# Patient Record
Sex: FEMALE | Race: WHITE | NOT HISPANIC OR LATINO | Employment: UNEMPLOYED | URBAN - METROPOLITAN AREA
[De-identification: names, ages, dates, MRNs, and addresses within clinical notes are randomized per-mention and may not be internally consistent; named-entity substitution may affect disease eponyms.]

---

## 2023-06-10 ENCOUNTER — APPOINTMENT (OUTPATIENT)
Dept: ULTRASOUND IMAGING | Facility: CLINIC | Age: 63
End: 2023-06-10
Attending: STUDENT IN AN ORGANIZED HEALTH CARE EDUCATION/TRAINING PROGRAM
Payer: MEDICAID

## 2023-06-10 ENCOUNTER — HOSPITAL ENCOUNTER (INPATIENT)
Facility: CLINIC | Age: 63
LOS: 4 days | Discharge: HOME | End: 2023-06-14
Attending: STUDENT IN AN ORGANIZED HEALTH CARE EDUCATION/TRAINING PROGRAM | Admitting: OBSTETRICS & GYNECOLOGY
Payer: MEDICAID

## 2023-06-10 DIAGNOSIS — I15.9 SECONDARY HYPERTENSION: ICD-10-CM

## 2023-06-10 DIAGNOSIS — Z90.710 S/P ABDOMINAL HYSTERECTOMY: ICD-10-CM

## 2023-06-10 DIAGNOSIS — N85.8 UTERINE MASS: ICD-10-CM

## 2023-06-10 DIAGNOSIS — N93.9 VAGINAL BLEEDING: Primary | ICD-10-CM

## 2023-06-10 DIAGNOSIS — N93.9 UTERINE BLEEDING: ICD-10-CM

## 2023-06-10 DIAGNOSIS — I10 ESSENTIAL HYPERTENSION: ICD-10-CM

## 2023-06-10 DIAGNOSIS — R57.1 HYPOVOLEMIC SHOCK (H): ICD-10-CM

## 2023-06-10 DIAGNOSIS — G89.18 ACUTE POST-OPERATIVE PAIN: ICD-10-CM

## 2023-06-10 LAB
ABO/RH(D): NORMAL
ANION GAP SERPL CALCULATED.3IONS-SCNC: 16 MMOL/L (ref 7–15)
ANTIBODY SCREEN: NEGATIVE
ATRIAL RATE - MUSE: 111 BPM
BASOPHILS # BLD AUTO: 0.1 10E3/UL (ref 0–0.2)
BASOPHILS NFR BLD AUTO: 1 %
BLD PROD TYP BPU: NORMAL
BLD PROD TYP BPU: NORMAL
BLOOD COMPONENT TYPE: NORMAL
BLOOD COMPONENT TYPE: NORMAL
BUN SERPL-MCNC: 11.6 MG/DL (ref 8–23)
CA-I BLD-MCNC: 4.7 MG/DL (ref 4.4–5.2)
CALCIUM SERPL-MCNC: 8.9 MG/DL (ref 8.8–10.2)
CHLORIDE SERPL-SCNC: 97 MMOL/L (ref 98–107)
CODING SYSTEM: NORMAL
CODING SYSTEM: NORMAL
CPB POCT: NO
CREAT SERPL-MCNC: 0.87 MG/DL (ref 0.51–0.95)
CROSSMATCH: NORMAL
CROSSMATCH: NORMAL
DEPRECATED HCO3 PLAS-SCNC: 20 MMOL/L (ref 22–29)
DIASTOLIC BLOOD PRESSURE - MUSE: NORMAL MMHG
EOSINOPHIL # BLD AUTO: 0.2 10E3/UL (ref 0–0.7)
EOSINOPHIL NFR BLD AUTO: 1 %
ERYTHROCYTE [DISTWIDTH] IN BLOOD BY AUTOMATED COUNT: 15.3 % (ref 10–15)
GFR SERPL CREATININE-BSD FRML MDRD: 75 ML/MIN/1.73M2
GLUCOSE BLD-MCNC: 215 MG/DL (ref 70–99)
GLUCOSE SERPL-MCNC: 228 MG/DL (ref 70–99)
HCO3 BLDV-SCNC: 23 MMOL/L (ref 21–28)
HCO3 BLDV-SCNC: 24 MMOL/L (ref 21–28)
HCT VFR BLD AUTO: 20.3 % (ref 35–47)
HCT VFR BLD CALC: 17 % (ref 35–47)
HGB BLD-MCNC: 5.8 G/DL (ref 11.7–15.7)
HGB BLD-MCNC: 6.2 G/DL (ref 11.7–15.7)
HGB BLD-MCNC: 8.3 G/DL (ref 11.7–15.7)
HOLD SPECIMEN: NORMAL
IMM GRANULOCYTES # BLD: 0.3 10E3/UL
IMM GRANULOCYTES NFR BLD: 2 %
INR PPP: 1.04 (ref 0.85–1.15)
INTERPRETATION ECG - MUSE: NORMAL
ISSUE DATE AND TIME: NORMAL
ISSUE DATE AND TIME: NORMAL
LACTATE BLD-SCNC: 4.4 MMOL/L
LACTATE SERPL-SCNC: 2.1 MMOL/L (ref 0.7–2)
LYMPHOCYTES # BLD AUTO: 3.9 10E3/UL (ref 0.8–5.3)
LYMPHOCYTES NFR BLD AUTO: 22 %
MCH RBC QN AUTO: 28.2 PG (ref 26.5–33)
MCHC RBC AUTO-ENTMCNC: 30.5 G/DL (ref 31.5–36.5)
MCV RBC AUTO: 92 FL (ref 78–100)
MONOCYTES # BLD AUTO: 0.9 10E3/UL (ref 0–1.3)
MONOCYTES NFR BLD AUTO: 5 %
NEUTROPHILS # BLD AUTO: 12.4 10E3/UL (ref 1.6–8.3)
NEUTROPHILS NFR BLD AUTO: 69 %
NRBC # BLD AUTO: 0.5 10E3/UL
NRBC BLD AUTO-RTO: 3 /100
P AXIS - MUSE: 67 DEGREES
PCO2 BLDV: 34 MM HG (ref 40–50)
PCO2 BLDV: 35 MM HG (ref 40–50)
PH BLDV: 7.44 [PH] (ref 7.32–7.43)
PH BLDV: 7.44 [PH] (ref 7.32–7.43)
PLATELET # BLD AUTO: 430 10E3/UL (ref 150–450)
PO2 BLDV: 15 MM HG (ref 25–47)
PO2 BLDV: 16 MM HG (ref 25–47)
POTASSIUM BLD-SCNC: 4.1 MMOL/L (ref 3.4–5.3)
POTASSIUM SERPL-SCNC: 4.3 MMOL/L (ref 3.4–5.3)
PR INTERVAL - MUSE: 126 MS
QRS DURATION - MUSE: 80 MS
QT - MUSE: 368 MS
QTC - MUSE: 500 MS
R AXIS - MUSE: 39 DEGREES
RADIOLOGIST FLAGS: ABNORMAL
RBC # BLD AUTO: 2.2 10E6/UL (ref 3.8–5.2)
SAO2 % BLDV: 21 % (ref 94–100)
SAO2 % BLDV: 24 % (ref 94–100)
SODIUM BLD-SCNC: 133 MMOL/L (ref 133–144)
SODIUM SERPL-SCNC: 133 MMOL/L (ref 136–145)
SPECIMEN EXPIRATION DATE: NORMAL
SYSTOLIC BLOOD PRESSURE - MUSE: NORMAL MMHG
T AXIS - MUSE: 49 DEGREES
UNIT ABO/RH: NORMAL
UNIT ABO/RH: NORMAL
UNIT NUMBER: NORMAL
UNIT NUMBER: NORMAL
UNIT STATUS: NORMAL
UNIT STATUS: NORMAL
UNIT TYPE ISBT: 5100
UNIT TYPE ISBT: 5100
VENTRICULAR RATE- MUSE: 111 BPM
WBC # BLD AUTO: 17.8 10E3/UL (ref 4–11)

## 2023-06-10 PROCEDURE — 99223 1ST HOSP IP/OBS HIGH 75: CPT | Mod: 57 | Performed by: OBSTETRICS & GYNECOLOGY

## 2023-06-10 PROCEDURE — 36415 COLL VENOUS BLD VENIPUNCTURE: CPT | Performed by: STUDENT IN AN ORGANIZED HEALTH CARE EDUCATION/TRAINING PROGRAM

## 2023-06-10 PROCEDURE — 96360 HYDRATION IV INFUSION INIT: CPT

## 2023-06-10 PROCEDURE — 86923 COMPATIBILITY TEST ELECTRIC: CPT | Performed by: STUDENT IN AN ORGANIZED HEALTH CARE EDUCATION/TRAINING PROGRAM

## 2023-06-10 PROCEDURE — 99291 CRITICAL CARE FIRST HOUR: CPT | Mod: 25

## 2023-06-10 PROCEDURE — 86923 COMPATIBILITY TEST ELECTRIC: CPT

## 2023-06-10 PROCEDURE — 82803 BLOOD GASES ANY COMBINATION: CPT

## 2023-06-10 PROCEDURE — 99291 CRITICAL CARE FIRST HOUR: CPT | Mod: 25 | Performed by: STUDENT IN AN ORGANIZED HEALTH CARE EDUCATION/TRAINING PROGRAM

## 2023-06-10 PROCEDURE — 76856 US EXAM PELVIC COMPLETE: CPT

## 2023-06-10 PROCEDURE — 85018 HEMOGLOBIN: CPT | Performed by: STUDENT IN AN ORGANIZED HEALTH CARE EDUCATION/TRAINING PROGRAM

## 2023-06-10 PROCEDURE — 85610 PROTHROMBIN TIME: CPT | Performed by: STUDENT IN AN ORGANIZED HEALTH CARE EDUCATION/TRAINING PROGRAM

## 2023-06-10 PROCEDURE — 82947 ASSAY GLUCOSE BLOOD QUANT: CPT | Performed by: STUDENT IN AN ORGANIZED HEALTH CARE EDUCATION/TRAINING PROGRAM

## 2023-06-10 PROCEDURE — 82947 ASSAY GLUCOSE BLOOD QUANT: CPT

## 2023-06-10 PROCEDURE — P9016 RBC LEUKOCYTES REDUCED: HCPCS | Performed by: STUDENT IN AN ORGANIZED HEALTH CARE EDUCATION/TRAINING PROGRAM

## 2023-06-10 PROCEDURE — 96361 HYDRATE IV INFUSION ADD-ON: CPT

## 2023-06-10 PROCEDURE — 85025 COMPLETE CBC W/AUTO DIFF WBC: CPT | Performed by: STUDENT IN AN ORGANIZED HEALTH CARE EDUCATION/TRAINING PROGRAM

## 2023-06-10 PROCEDURE — 83036 HEMOGLOBIN GLYCOSYLATED A1C: CPT

## 2023-06-10 PROCEDURE — 76856 US EXAM PELVIC COMPLETE: CPT | Mod: 26 | Performed by: RADIOLOGY

## 2023-06-10 PROCEDURE — 83605 ASSAY OF LACTIC ACID: CPT

## 2023-06-10 PROCEDURE — 258N000003 HC RX IP 258 OP 636: Performed by: STUDENT IN AN ORGANIZED HEALTH CARE EDUCATION/TRAINING PROGRAM

## 2023-06-10 PROCEDURE — 86901 BLOOD TYPING SEROLOGIC RH(D): CPT | Performed by: STUDENT IN AN ORGANIZED HEALTH CARE EDUCATION/TRAINING PROGRAM

## 2023-06-10 PROCEDURE — 86850 RBC ANTIBODY SCREEN: CPT | Performed by: STUDENT IN AN ORGANIZED HEALTH CARE EDUCATION/TRAINING PROGRAM

## 2023-06-10 PROCEDURE — 93010 ELECTROCARDIOGRAM REPORT: CPT | Performed by: STUDENT IN AN ORGANIZED HEALTH CARE EDUCATION/TRAINING PROGRAM

## 2023-06-10 PROCEDURE — 36430 TRANSFUSION BLD/BLD COMPNT: CPT

## 2023-06-10 PROCEDURE — 93005 ELECTROCARDIOGRAM TRACING: CPT

## 2023-06-10 PROCEDURE — 120N000002 HC R&B MED SURG/OB UMMC

## 2023-06-10 RX ORDER — ONDANSETRON 2 MG/ML
4 INJECTION INTRAMUSCULAR; INTRAVENOUS EVERY 6 HOURS PRN
Status: DISCONTINUED | OUTPATIENT
Start: 2023-06-10 | End: 2023-06-14 | Stop reason: HOSPADM

## 2023-06-10 RX ORDER — ACETAMINOPHEN 325 MG/1
975 TABLET ORAL ONCE
Status: CANCELLED | OUTPATIENT
Start: 2023-06-10 | End: 2023-06-10

## 2023-06-10 RX ORDER — ONDANSETRON 4 MG/1
4 TABLET, ORALLY DISINTEGRATING ORAL EVERY 6 HOURS PRN
Status: DISCONTINUED | OUTPATIENT
Start: 2023-06-10 | End: 2023-06-14 | Stop reason: HOSPADM

## 2023-06-10 RX ORDER — ACETAMINOPHEN 325 MG/1
650 TABLET ORAL EVERY 6 HOURS PRN
Status: DISCONTINUED | OUTPATIENT
Start: 2023-06-10 | End: 2023-06-13

## 2023-06-10 RX ORDER — LIDOCAINE 40 MG/G
CREAM TOPICAL
Status: DISCONTINUED | OUTPATIENT
Start: 2023-06-10 | End: 2023-06-12

## 2023-06-10 RX ORDER — AMOXICILLIN 250 MG
2 CAPSULE ORAL 2 TIMES DAILY PRN
Status: DISCONTINUED | OUTPATIENT
Start: 2023-06-10 | End: 2023-06-14 | Stop reason: HOSPADM

## 2023-06-10 RX ORDER — SODIUM CHLORIDE 9 MG/ML
INJECTION, SOLUTION INTRAVENOUS CONTINUOUS
Status: DISCONTINUED | OUTPATIENT
Start: 2023-06-10 | End: 2023-06-11

## 2023-06-10 RX ORDER — AMOXICILLIN 250 MG
1 CAPSULE ORAL 2 TIMES DAILY PRN
Status: DISCONTINUED | OUTPATIENT
Start: 2023-06-10 | End: 2023-06-14 | Stop reason: HOSPADM

## 2023-06-10 RX ADMIN — SODIUM CHLORIDE 1000 ML: 9 INJECTION, SOLUTION INTRAVENOUS at 18:13

## 2023-06-10 ASSESSMENT — ACTIVITIES OF DAILY LIVING (ADL)
ADLS_ACUITY_SCORE: 35

## 2023-06-10 NOTE — ED TRIAGE NOTES
"Pt arrives ambulatory to triage w/ c/o vaginal bleeding, sob, dizziness, cold/clammy. Endorses she had ~11 months w/out bleeding- thought she was becoming post menopausal when she started to experience heavy bleeding w/ clots. Catalyst to being seen pt began experiencing heavier bleeding and passing ~6in size clots. States pta soaking through 8-12 pads throughout the day. Endorses a \"teeny bit\" of abd pain.      "

## 2023-06-10 NOTE — ED TRIAGE NOTES
Triage Assessment     Row Name 06/10/23 8092       Triage Assessment (Adult)    Airway WDL WDL       Respiratory WDL    Respiratory WDL WDL       Skin Circulation/Temperature WDL    Skin Circulation/Temperature WDL X  pallor       Cardiac WDL    Cardiac WDL X;rhythm       Peripheral/Neurovascular WDL    Peripheral Neurovascular WDL WDL       Cognitive/Neuro/Behavioral WDL    Cognitive/Neuro/Behavioral WDL WDL

## 2023-06-10 NOTE — ED PROVIDER NOTES
[facility] ([facility])    6/10/23      History     Chief Complaint   Patient presents with     Vaginal Bleeding     HPI  Kylah Ojeda is a 62 year old female who with PMH of hypertension and controlled type II DM without complication, without long-term current use of insulin presents to the ED with heavy vaginal bleeding.  Patient reports she had initially thought she was going through menopause with 11 to 12 months with no periods and subsequently had an increase in heavy vaginal bleeding that has worsened over the past year.  States she started having symptoms of dizziness and lightheadedness approximately 8 hours ago that came along with heavy vaginal bleeding.  Has not noted any other bleeding.  Is not on any blood thinning medications.  Reports she has not seen a doctor for the symptoms because she lost her job and did not have insurance.  Denies any fevers, chills or other infectious symptoms.  No nausea or vomiting.  Denies abdominal pain aside from cramping.      Physical Exam   BP: 97/55  Pulse: 120  Temp: 97.6  F (36.4  C)  Resp: 16  SpO2: 99 %  Physical Exam  General: no acute distress. Appears stated age.   HENT: MMM, no oropharyngeal lesions  Eyes: PERRL, normal sclerae  Neck: non-tender, supple  Cardio: Tachycardic rate. Regular rhythm. Extremities well perfused  Resp: Normal work of breathing, normal respiratory rate.  Chest/Back: no visual signs of trauma, no CVA tenderness  Abdomen: no tenderness, non-distended, no rebound, no guarding  :   Neuro: alert and fully oriented. CN II-XII grossly intact. Grossly normal strength and sensation in all extremities.   MSK: no deformities. Grossly normal ROM in extremities.   Integumentary/Skin: no rash visualized, normal color  Psych: normal affect, normal behavior    ED Course, Procedures, & Data      Procedures                   Results for orders placed or performed during the hospital encounter of 06/10/23   US Pelvis  Complete without Transvaginal     Status: None (Preliminary result)    Impression    RESIDENT PRELIMINARY INTERPRETATION  IMPRESSION: Large endometrial hematoma with scattered areas of  vascular flow measuring up to 10.5 cm.      [Urgent Result: Uterine hematoma]    Finding was identified on 6/10/2023 8:29 PM.     Dr. Yao was contacted by Dr. Vickey Varner at 6/10/2023 8:34 PM and  verbalized understanding of the urgent finding.    [facility] Draw     Status: None    Narrative    The following orders were created for panel order [facility] Draw.  Procedure                               Abnormality         Status                     ---------                               -----------         ------                     Extra Blue Top Tube[[id number]]                              Final result               Extra Red Top Tube[[id number]]                               Final result               Extra Green Top (Lithium...[[id number]]                      Final result               Extra Purple Top Tube[[id number]]                            Final result               Extra Blood Bank Purple ...[[id number]]                      Final result                 Please view results for these tests on the individual orders.   Extra Blue Top Tube     Status: None   Result Value Ref Range    Hold Specimen JIC    Extra Red Top Tube     Status: None   Result Value Ref Range    Hold Specimen JIC    Extra Green Top (Lithium Heparin) Tube     Status: None   Result Value Ref Range    Hold Specimen JIC    Extra Purple Top Tube     Status: None   Result Value Ref Range    Hold Specimen JIC    Extra Blood Bank Purple Top Tube     Status: None   Result Value Ref Range    Hold Specimen JIC    Basic metabolic panel     Status: Abnormal   Result Value Ref Range    Sodium 133 (L) 136 - 145 mmol/L    Potassium 4.3 3.4 - 5.3 mmol/L    Chloride 97 (L) 98 - 107 mmol/L    Carbon Dioxide (CO2) 20 (L) 22 - 29 mmol/L    Anion Gap 16 (H) 7 - 15 mmol/L    Urea  Nitrogen 11.6 8.0 - 23.0 mg/dL    Creatinine 0.87 0.51 - 0.95 mg/dL    Calcium 8.9 8.8 - 10.2 mg/dL    Glucose 228 (H) 70 - 99 mg/dL    GFR Estimate 75 >60 mL/min/1.73m2   INR     Status: Normal   Result Value Ref Range    INR 1.04 0.85 - 1.15   CBC with platelets and differential     Status: Abnormal   Result Value Ref Range    WBC Count 17.8 (H) 4.0 - 11.0 10e3/uL    RBC Count 2.20 (L) 3.80 - 5.20 10e6/uL    Hemoglobin 6.2 (LL) 11.7 - 15.7 g/dL    Hematocrit 20.3 (L) 35.0 - 47.0 %    MCV 92 78 - 100 fL    MCH 28.2 26.5 - 33.0 pg    MCHC 30.5 (L) 31.5 - 36.5 g/dL    RDW 15.3 (H) 10.0 - 15.0 %    Platelet Count 430 150 - 450 10e3/uL    % Neutrophils 69 %    % Lymphocytes 22 %    % Monocytes 5 %    % Eosinophils 1 %    % Basophils 1 %    % Immature Granulocytes 2 %    NRBCs per 100 WBC 3 (H) <1 /100    Absolute Neutrophils 12.4 (H) 1.6 - 8.3 10e3/uL    Absolute Lymphocytes 3.9 0.8 - 5.3 10e3/uL    Absolute Monocytes 0.9 0.0 - 1.3 10e3/uL    Absolute Eosinophils 0.2 0.0 - 0.7 10e3/uL    Absolute Basophils 0.1 0.0 - 0.2 10e3/uL    Absolute Immature Granulocytes 0.3 <=0.4 10e3/uL    Absolute NRBCs 0.5 10e3/uL   iStat Gases Electrolytes ICA Glucose Venous, POCT     Status: Abnormal   Result Value Ref Range    CPB Applied No     Hematocrit POCT 17 (L) 35 - 47 %    Calcium, Ionized Whole Blood POCT 4.7 4.4 - 5.2 mg/dL    Glucose Whole Blood POCT 215 (H) 70 - 99 mg/dL    Bicarbonate Venous POCT 23 21 - 28 mmol/L    Hemoglobin POCT 5.8 (LL) 11.7 - 15.7 g/dL    Potassium POCT 4.1 3.4 - 5.3 mmol/L    Sodium POCT 133 133 - 144 mmol/L    pCO2 Venous POCT 34 (L) 40 - 50 mm Hg    pO2 Venous POCT 15 (L) 25 - 47 mm Hg    pH Venous POCT 7.44 (H) 7.32 - 7.43    O2 Sat, Venous POCT 21 (L) 94 - 100 %   iStat Gases (lactate) venous, POCT     Status: Abnormal   Result Value Ref Range    Lactic Acid POCT 4.4 (HH) <=2.0 mmol/L    Bicarbonate Venous POCT 24 21 - 28 mmol/L    O2 Sat, Venous POCT 24 (L) 94 - 100 %    pCO2 Venous POCT 35 (L) 40  - 50 mm Hg    pH Venous POCT 7.44 (H) 7.32 - 7.43    pO2 Venous POCT 16 (L) 25 - 47 mm Hg   EKG 12 lead     Status: None (Preliminary result)   Result Value Ref Range    Systolic Blood Pressure  mmHg    Diastolic Blood Pressure  mmHg    Ventricular Rate 111 BPM    Atrial Rate 111 BPM    MN Interval 126 ms    QRS Duration 80 ms     ms    QTc 500 ms    P Axis 67 degrees    R AXIS 39 degrees    T Axis 49 degrees    Interpretation ECG       Sinus tachycardia  Nonspecific ST and T wave abnormality  Abnormal ECG     Adult Type and Screen     Status: None   Result Value Ref Range    ABO/RH(D) B POS     Antibody Screen Negative Negative    SPECIMEN EXPIRATION DATE 20230613235900    Prepare red blood cells (unit)     Status: None   Result Value Ref Range    Blood Component Type Red Blood Cells     Product Code W9809G14     Unit Status Transfused     Unit Number [id number]     CROSSMATCH Compatible     CODING SYSTEM [id number]     ISSUE DATE AND TIME 20230610213200     UNIT ABO/RH O+     UNIT TYPE ISBT 5100    Prepare red blood cells (unit)     Status: None   Result Value Ref Range    Blood Component Type Red Blood Cells     Product Code U3621L52     Unit Status Transfused     Unit Number [id number]     CROSSMATCH Compatible     CODING SYSTEM [id number]     ISSUE DATE AND TIME 20230610200200     UNIT ABO/RH O+     UNIT TYPE ISBT 5100    ABO/Rh type and screen     Status: None    Narrative    The following orders were created for panel order ABO/Rh type and screen.  Procedure                               Abnormality         Status                     ---------                               -----------         ------                     Adult Type and Screen[[id number]]                            Final result                 Please view results for these tests on the individual orders.   CBC with platelets differential     Status: Abnormal    Narrative    The following orders were created for panel order CBC with  platelets differential.  Procedure                               Abnormality         Status                     ---------                               -----------         ------                     CBC with platelets and d...[[id number]]  Abnormal            Final result                 Please view results for these tests on the individual orders.     Medications   0.9% sodium chloride BOLUS (0 mLs Intravenous Stopped 6/10/23 1934)     Followed by   sodium chloride 0.9% infusion (has no administration in time range)     Labs Ordered and Resulted from Time of ED Arrival to Time of ED Departure   BASIC METABOLIC PANEL - Abnormal       Result Value    Sodium 133 (*)     Potassium 4.3      Chloride 97 (*)     Carbon Dioxide (CO2) 20 (*)     Anion Gap 16 (*)     Urea Nitrogen 11.6      Creatinine 0.87      Calcium 8.9      Glucose 228 (*)     GFR Estimate 75     CBC WITH PLATELETS AND DIFFERENTIAL - Abnormal    WBC Count 17.8 (*)     RBC Count 2.20 (*)     Hemoglobin 6.2 (*)     Hematocrit 20.3 (*)     MCV 92      MCH 28.2      MCHC 30.5 (*)     RDW 15.3 (*)     Platelet Count 430      % Neutrophils 69      % Lymphocytes 22      % Monocytes 5      % Eosinophils 1      % Basophils 1      % Immature Granulocytes 2      NRBCs per 100 WBC 3 (*)     Absolute Neutrophils 12.4 (*)     Absolute Lymphocytes 3.9      Absolute Monocytes 0.9      Absolute Eosinophils 0.2      Absolute Basophils 0.1      Absolute Immature Granulocytes 0.3      Absolute NRBCs 0.5     ISTAT GASES ELECTROLYTES ICA GLUCOSE VENOUS POCT - Abnormal    CPB Applied No      Hematocrit POCT 17 (*)     Calcium, Ionized Whole Blood POCT 4.7      Glucose Whole Blood POCT 215 (*)     Bicarbonate Venous POCT 23      Hemoglobin POCT 5.8 (*)     Potassium POCT 4.1      Sodium POCT 133      pCO2 Venous POCT 34 (*)     pO2 Venous POCT 15 (*)     pH Venous POCT 7.44 (*)     O2 Sat, Venous POCT 21 (*)    ISTAT GASES LACTATE VENOUS POCT - Abnormal    Lactic Acid POCT 4.4  (*)     Bicarbonate Venous POCT 24      O2 Sat, Venous POCT 24 (*)     pCO2 Venous POCT 35 (*)     pH Venous POCT 7.44 (*)     pO2 Venous POCT 16 (*)    INR - Normal    INR 1.04     LACTIC ACID WHOLE BLOOD   TYPE AND SCREEN, ADULT    ABO/RH(D) B POS      Antibody Screen Negative      SPECIMEN EXPIRATION DATE 10940972046602     PREPARE RED BLOOD CELLS (UNIT)    Blood Component Type Red Blood Cells      Product Code Z3343N03      Unit Status Transfused      Unit Number [id number]      CROSSMATCH Compatible      CODING SYSTEM [id number]      ISSUE DATE AND TIME 20230610213200      UNIT ABO/RH O+      UNIT TYPE ISBT 5100     PREPARE RED BLOOD CELLS (UNIT)    Blood Component Type Red Blood Cells      Product Code F3147N92      Unit Status Transfused      Unit Number [id number]      CROSSMATCH Compatible      CODING SYSTEM [id number]      ISSUE DATE AND TIME 20230610200200      UNIT ABO/RH O+      UNIT TYPE ISBT 5100     PREPARE RED BLOOD CELLS (UNIT)   TRANSFUSE RED BLOOD CELLS (UNIT)   TRANSFUSE RED BLOOD CELLS (UNIT)   ABO/RH TYPE AND SCREEN     US Pelvis Complete without Transvaginal   Preliminary Result   RESIDENT PRELIMINARY INTERPRETATION   IMPRESSION: Large endometrial hematoma with scattered areas of   vascular flow measuring up to 10.5 cm.         [Urgent Result: Uterine hematoma]      Finding was identified on 6/10/2023 8:29 PM.       Dr. Yao was contacted by Dr. Vickey Varner at 6/10/2023 8:34 PM and   verbalized understanding of the urgent finding.              Critical care was performed.   Critical Care Addendum  My initial assessment, based on my review of nursing observations, review of vital signs, focused history, physical exam, review of cardiac rhythm monitor, discussion with obgyn and interpretation of labs , established a high suspicion that Kylah Ojeda has severe hemorrhage and severe hypotension, which requires immediate intervention, and therefore she is critically ill.     After the initial  assessment, the care team initiated multiple lab tests, initiated IV fluid administration, consulted with GYN and performed blood transfusion to provide stabilization care. Due to the critical nature of this patient, I reassessed vital signs, physical exam, review of cardiac rhythm monitor and mental status multiple times prior to her disposition.     Time also spent performing documentation, reviewing test results, discussion with consultants and coordination of care.     Critical care time (excluding teaching time and procedures): 35 minutes.     Assessment & Plan    Kylah Ojeda 62-year-old female presenting with heavy vaginal bleeding, tachycardia and hypotension.  She appears pale but is mentating appropriately.  Concern for significant hemorrhage.  Arrives with heart rate in the 120s and blood pressure dipping to the 80s systolic.  Blood pressure did respond to initial bolus of fluids.  On my pelvic exam, she did have a large amount of blood and clot in the vaginal vault that was cleared.  The cervix was visualized and she had dark clot protruding from her cervix.  I did not disrupt this clot.  CBC, BMP, i-STAT labs and type and screen were sent on arrival.    Initial hemoglobin 5.8.  Lactate 4.  Creatinine within normal limits and no significant electrolyte abnormalities.  Elevated to 18.  We will transfuse 2 units of PRBCs.  Consulted gynecology.  Pelvic ultrasound shows heterogeneous material in the uterus consistent with possible hematoma versus malignancy.  Patient will admit to the GYN service and will likely undergo D&C this evening.    I have reviewed the nursing notes. I have reviewed the findings, diagnosis, plan and need for follow up with the patient.    New Prescriptions    No medications on file       Final diagnoses:   Vaginal bleeding       Gwen Yao MD  [facility] EMERGENCY DEPARTMENT  6/10/2023     Gwen Yao MD  06/10/23 6407

## 2023-06-11 ENCOUNTER — APPOINTMENT (OUTPATIENT)
Dept: CT IMAGING | Facility: CLINIC | Age: 63
End: 2023-06-11
Payer: MEDICAID

## 2023-06-11 ENCOUNTER — ANESTHESIA EVENT (OUTPATIENT)
Dept: SURGERY | Facility: CLINIC | Age: 63
End: 2023-06-11
Payer: MEDICAID

## 2023-06-11 LAB
ALBUMIN SERPL BCG-MCNC: 3.8 G/DL (ref 3.5–5.2)
ALBUMIN UR-MCNC: NEGATIVE MG/DL
ALP SERPL-CCNC: 44 U/L (ref 35–104)
ALT SERPL W P-5'-P-CCNC: 12 U/L (ref 10–35)
ANION GAP SERPL CALCULATED.3IONS-SCNC: 18 MMOL/L (ref 7–15)
APPEARANCE UR: CLEAR
AST SERPL W P-5'-P-CCNC: 17 U/L (ref 10–35)
BILIRUB SERPL-MCNC: 0.2 MG/DL
BILIRUB UR QL STRIP: NEGATIVE
BLD PROD TYP BPU: NORMAL
BLD PROD TYP BPU: NORMAL
BLOOD COMPONENT TYPE: NORMAL
BLOOD COMPONENT TYPE: NORMAL
BUN SERPL-MCNC: 11.7 MG/DL (ref 8–23)
CALCIUM SERPL-MCNC: 9 MG/DL (ref 8.8–10.2)
CHLORIDE SERPL-SCNC: 97 MMOL/L (ref 98–107)
CODING SYSTEM: NORMAL
CODING SYSTEM: NORMAL
COLOR UR AUTO: ABNORMAL
CREAT SERPL-MCNC: 0.89 MG/DL (ref 0.51–0.95)
CROSSMATCH: NORMAL
CROSSMATCH: NORMAL
DEPRECATED HCO3 PLAS-SCNC: 18 MMOL/L (ref 22–29)
ERYTHROCYTE [DISTWIDTH] IN BLOOD BY AUTOMATED COUNT: 16.4 % (ref 10–15)
GFR SERPL CREATININE-BSD FRML MDRD: 73 ML/MIN/1.73M2
GLUCOSE BLDC GLUCOMTR-MCNC: 106 MG/DL (ref 70–99)
GLUCOSE BLDC GLUCOMTR-MCNC: 113 MG/DL (ref 70–99)
GLUCOSE BLDC GLUCOMTR-MCNC: 116 MG/DL (ref 70–99)
GLUCOSE BLDC GLUCOMTR-MCNC: 117 MG/DL (ref 70–99)
GLUCOSE BLDC GLUCOMTR-MCNC: 119 MG/DL (ref 70–99)
GLUCOSE SERPL-MCNC: 223 MG/DL (ref 70–99)
GLUCOSE UR STRIP-MCNC: NEGATIVE MG/DL
HBA1C MFR BLD: 5.7 %
HCT VFR BLD AUTO: 23 % (ref 35–47)
HGB BLD-MCNC: 7 G/DL (ref 11.7–15.7)
HGB BLD-MCNC: 7.5 G/DL (ref 11.7–15.7)
HGB UR QL STRIP: ABNORMAL
HOLD SPECIMEN: NORMAL
ISSUE DATE AND TIME: NORMAL
KETONES UR STRIP-MCNC: NEGATIVE MG/DL
LACTATE SERPL-SCNC: 1.1 MMOL/L (ref 0.7–2)
LEUKOCYTE ESTERASE UR QL STRIP: NEGATIVE
MCH RBC QN AUTO: 28.5 PG (ref 26.5–33)
MCHC RBC AUTO-ENTMCNC: 32.6 G/DL (ref 31.5–36.5)
MCV RBC AUTO: 88 FL (ref 78–100)
NITRATE UR QL: NEGATIVE
PH UR STRIP: 5.5 [PH] (ref 5–7)
PLATELET # BLD AUTO: 258 10E3/UL (ref 150–450)
POTASSIUM SERPL-SCNC: 4.4 MMOL/L (ref 3.4–5.3)
PROT SERPL-MCNC: 6 G/DL (ref 6.4–8.3)
RBC # BLD AUTO: 2.63 10E6/UL (ref 3.8–5.2)
RBC URINE: 164 /HPF
SODIUM SERPL-SCNC: 133 MMOL/L (ref 136–145)
SP GR UR STRIP: 1.02 (ref 1–1.03)
UNIT ABO/RH: NORMAL
UNIT ABO/RH: NORMAL
UNIT NUMBER: NORMAL
UNIT NUMBER: NORMAL
UNIT STATUS: NORMAL
UNIT STATUS: NORMAL
UNIT TYPE ISBT: 5100
UNIT TYPE ISBT: 5100
UROBILINOGEN UR STRIP-MCNC: NORMAL MG/DL
WBC # BLD AUTO: 14.2 10E3/UL (ref 4–11)
WBC URINE: 75 /HPF

## 2023-06-11 PROCEDURE — 74177 CT ABD & PELVIS W/CONTRAST: CPT

## 2023-06-11 PROCEDURE — 85018 HEMOGLOBIN: CPT | Performed by: OBSTETRICS & GYNECOLOGY

## 2023-06-11 PROCEDURE — 85027 COMPLETE CBC AUTOMATED: CPT

## 2023-06-11 PROCEDURE — 120N000002 HC R&B MED SURG/OB UMMC

## 2023-06-11 PROCEDURE — 81001 URINALYSIS AUTO W/SCOPE: CPT

## 2023-06-11 PROCEDURE — 82962 GLUCOSE BLOOD TEST: CPT

## 2023-06-11 PROCEDURE — 36415 COLL VENOUS BLD VENIPUNCTURE: CPT

## 2023-06-11 PROCEDURE — 36415 COLL VENOUS BLD VENIPUNCTURE: CPT | Performed by: OBSTETRICS & GYNECOLOGY

## 2023-06-11 PROCEDURE — 71260 CT THORAX DX C+: CPT | Mod: 26 | Performed by: RADIOLOGY

## 2023-06-11 PROCEDURE — 250N000011 HC RX IP 250 OP 636: Performed by: OBSTETRICS & GYNECOLOGY

## 2023-06-11 PROCEDURE — P9016 RBC LEUKOCYTES REDUCED: HCPCS

## 2023-06-11 PROCEDURE — 74177 CT ABD & PELVIS W/CONTRAST: CPT | Mod: 26 | Performed by: RADIOLOGY

## 2023-06-11 PROCEDURE — 83605 ASSAY OF LACTIC ACID: CPT

## 2023-06-11 PROCEDURE — 258N000003 HC RX IP 258 OP 636

## 2023-06-11 RX ORDER — SODIUM CHLORIDE, SODIUM LACTATE, POTASSIUM CHLORIDE, CALCIUM CHLORIDE 600; 310; 30; 20 MG/100ML; MG/100ML; MG/100ML; MG/100ML
INJECTION, SOLUTION INTRAVENOUS CONTINUOUS
Status: DISCONTINUED | OUTPATIENT
Start: 2023-06-12 | End: 2023-06-11

## 2023-06-11 RX ORDER — FERROUS SULFATE 325(65) MG
325 TABLET ORAL
COMMUNITY
End: 2023-08-08

## 2023-06-11 RX ORDER — DEXTROSE MONOHYDRATE 25 G/50ML
25-50 INJECTION, SOLUTION INTRAVENOUS
Status: DISCONTINUED | OUTPATIENT
Start: 2023-06-11 | End: 2023-06-14 | Stop reason: HOSPADM

## 2023-06-11 RX ORDER — IOPAMIDOL 755 MG/ML
107 INJECTION, SOLUTION INTRAVASCULAR ONCE
Status: COMPLETED | OUTPATIENT
Start: 2023-06-11 | End: 2023-06-11

## 2023-06-11 RX ORDER — NICOTINE POLACRILEX 4 MG
15-30 LOZENGE BUCCAL
Status: DISCONTINUED | OUTPATIENT
Start: 2023-06-11 | End: 2023-06-14 | Stop reason: HOSPADM

## 2023-06-11 RX ADMIN — SODIUM CHLORIDE: 9 INJECTION, SOLUTION INTRAVENOUS at 09:49

## 2023-06-11 RX ADMIN — IOPAMIDOL 107 ML: 755 INJECTION, SOLUTION INTRAVENOUS at 01:12

## 2023-06-11 RX ADMIN — SODIUM CHLORIDE: 9 INJECTION, SOLUTION INTRAVENOUS at 01:00

## 2023-06-11 ASSESSMENT — ACTIVITIES OF DAILY LIVING (ADL)
WALKING_OR_CLIMBING_STAIRS_DIFFICULTY: NO
VISION_MANAGEMENT: GLASSES
CONCENTRATING,_REMEMBERING_OR_MAKING_DECISIONS_DIFFICULTY: NO
DRESSING/BATHING_DIFFICULTY: NO
ADLS_ACUITY_SCORE: 20
WEAR_GLASSES_OR_BLIND: YES
ADLS_ACUITY_SCORE: 20
FALL_HISTORY_WITHIN_LAST_SIX_MONTHS: NO
ADLS_ACUITY_SCORE: 20
TOILETING_ISSUES: NO
DEPENDENT_IADLS:: INDEPENDENT
ADLS_ACUITY_SCORE: 20
ADLS_ACUITY_SCORE: 20
CHANGE_IN_FUNCTIONAL_STATUS_SINCE_ONSET_OF_CURRENT_ILLNESS/INJURY: NO
HEARING_DIFFICULTY_OR_DEAF: NO
ADLS_ACUITY_SCORE: 35
ADLS_ACUITY_SCORE: 35
ADLS_ACUITY_SCORE: 20
ADLS_ACUITY_SCORE: 20
DOING_ERRANDS_INDEPENDENTLY_DIFFICULTY: NO
ADLS_ACUITY_SCORE: 35
ADLS_ACUITY_SCORE: 35
DIFFICULTY_EATING/SWALLOWING: NO
ADLS_ACUITY_SCORE: 20

## 2023-06-11 NOTE — PLAN OF CARE
Admitted/transferred from: ED  2 RN skin assessment completed by Rox Schwartz, RN and Madhavi LOPEZ RN.  Skin assessment finding: no issues noted  Interventions/actions: none at this time     Will continue to monitor.

## 2023-06-11 NOTE — DISCHARGE SUMMARY
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  Exploratory laparotomy.  2.  Total abdominal hysterectomy with bilateral salpingo-oophorectomy.  3.  Bilateral pelvic and paraaortic lymph node dissection.    Prior to Admission Medications:  Medications Prior to Admission   Medication Sig Dispense Refill Last Dose     ferrous sulfate (FEROSUL) 325 (65 Fe) MG tablet Take 325 mg by mouth daily (with breakfast)   6/10/2023     Multiple Vitamins-Minerals (ONE-A-DAY 50 PLUS PO) Take 1 tablet by mouth daily   6/10/2023       Discharge Medications:     Review of your medicines      START taking      Dose / Directions   acetaminophen 325 MG tablet  Commonly known as: TYLENOL  Used for: S/P abdominal hysterectomy      Dose: 650 mg  Take 2 tablets (650 mg) by mouth every 6 hours as needed for mild pain  Quantity: 40 tablet  Refills: 0     amLODIPine 10 MG tablet  Commonly known as: NORVASC  Used for: Secondary hypertension      Dose: 10 mg  Start taking on: Ilana 15, 2023  Take 1 tablet (10 mg) by mouth daily  Quantity: 30 tablet  Refills: 0     enoxaparin ANTICOAGULANT 40 MG/0.4ML syringe  Commonly known as: LOVENOX  Used for: S/P abdominal hysterectomy      Dose: 40 mg  Inject 0.4 mLs (40 mg) Subcutaneous every 24 hours  for 28 doses  Quantity: 11.2 mL  Refills: 0     ibuprofen 600 MG tablet  Commonly known as: ADVIL/MOTRIN  Used for: Acute post-operative pain      Dose: 600 mg  Take 1 tablet (600 mg) by mouth every 6 hours as needed for inflammatory pain  Quantity: 20 tablet  Refills: 0     oxyCODONE 5 MG tablet  Commonly known as: ROXICODONE  Used for: Acute post-operative pain      Dose: 5 mg  Take 1 tablet (5 mg) by mouth every 6 hours as needed for severe pain  Quantity: 12 tablet  Refills: 0     senna-docusate 8.6-50 MG tablet  Commonly known as: SENOKOT-S/PERICOLACE  Used for: S/P abdominal hysterectomy      Dose: 1-2 tablet  Take 1-2 tablets by mouth 2 times daily as needed for constipation  Quantity: 60 tablet  Refills: 0        CONTINUE these medicines which have NOT CHANGED      Dose / Directions   ferrous sulfate 325 (65 Fe) MG tablet  Commonly known as: FEROSUL      Dose: 325 mg  Take 325 mg by mouth daily (with breakfast)  Refills: 0     ONE-A-DAY 50 PLUS PO      Dose: 1 tablet  Take 1 tablet by mouth daily  Refills: 0           Where to get your medicines      These medications were sent to [facility] [place], [place] - [address]  [address], [facility] [address]    Phone: [phone]     acetaminophen 325 MG tablet    amLODIPine 10 MG tablet    enoxaparin ANTICOAGULANT 40 MG/0.4ML syringe    ibuprofen 600 MG tablet    oxyCODONE 5 MG tablet    senna-docusate 8.6-50 MG tablet       Consultations:  - Social work    Brief History of Illness:  Kylah Ojeda is a 62 year old  who presented to the ED with heavy vaginal bleeding. Patient initially tachycardic and hypotensive with a hgb of 5.8. Bleeding slowed and patient stabilized after IVF and 2U of pRBCs. Ultrasound significant for large uterus measuring 19.2x7.8x11.6 w/ large heterogenous hyperechoic lesions with scattered areas of vascular flow highly concerning for malignancy. Admitted to gynecologic oncology for further  evaluation and workup.     Hospital Course:  Dz:   - US (6/10): large endometrial mass w/ vascular flow up to 10.5cm extending into cervical canal; CT CAP (6/11) enlargement/heterogeneity of uterus centrally. No definite findings to suggest metastatic disease. Patient underwent above stated procedure. Frozen Path: endometrioid endometrial adenocarcinoma. Final pathology pending at time of discharge. Patient will follow up next week in clinic for a post op check and HTN eval with an NP and in 2-3 weeks with Dr Dunlap for review of final pathology and treatment planning. She will call prior to then for questions or concerns. Provided discharge instructions and when to call the clinic.   FEN:   - Patient tolerating PO intake initially. She was made NPO for her surgery. She was maintained on IVF until POD#1, when her diet was slowly advanced.  By discharge, she was tolerating a regular diet without nausea and vomiting and able to maintain her hydration without IVF supplementation.  Pain:   -  Pain minimal on admission. She received a TAP block in the OR following her procedure. Postoperatively, pain was well controlled on ibuprofen, tylenol, and minimal oxycodone once she was transitioned to PO pain regimen. She was discharged home with these medications.  CV:   - Patient with a history of hypertension, not on medications due to lack of recent medical care. Upon arrival, patient tachycardic and hypotension secondary to blood loss, see below. Following IVF and 2 U pRBCs, normotensive and tachycardia resolved. She was subsequently hypertensive, both preoperatively and postoperatively. On POD#1, she was started on amlodipine 5mg and increased to 10 mg on POD#2 for persistent -180s/60-80s. BP cuff ordered on discharge with instructions of monitoring BP 1-2 times per day and recording. Discussed when to call the clinic. She will follow up with NP in clinic next week for a post op check and reeval of BP. She was also  referred to follow up with primary care after discharge for comorbid conditions and preventative care.  PULM:   - She has no history of pulmonary issues. She was initially given O2 supplementation in to maintain her O2 sats in the immediate postop period and was transitioned off of this without difficulty. By discharge, her O2 sats were greater than 94% on RA.  She was encouraged to use her bedside IS while in house.  She had no acute pulmonary issues while in house.  HEME:   - Upon arrival her hemoglobin was 5.8 with significant bleeding from the uterus. During her entire hospital stay she received a total of 3 units of PRBC. Her hemoglobin levels sariah appropriately and by day of discharge her hemoglobin was stable at 8.3. By day of discharge she was asymptomatic from acute blood loss anemia. She had no other acute heme issues while in house.  GI:   - She was made NPO prior to her surgery. On POD#0, her diet wasadvanced slowly as tolerated. At the time of discharge, she was tolerating a regular diet without nausea and vomiting and had a bowel movement. She will be discharged with a bowel regimen to prevent constipation in the postoperative period. She had no acute GI issues while in house.  :    - A christiansen catheter was placed at the time of the surgery. Once ambulating unassisted, the christiansen catheter was removed. Prior to discharge, the patient was voiding spontaneously without difficulty. She had no acute  issues while in house.  ID:   - The patient was AF during her hospitalization.  She received standard preoperative antibiotics without incident.    ENDO:   - History of diabetes without management. Her hgb A1c on arrival was 5.7. Her blood glucose was checked and she was placed on sliding scale insulin. During her surgery, she received decadron. On POD#0 into POD#1, the patient had elevated blood sugars requiring treatment with sliding scale insulin. She did not subsequently require any treatment. She will  follow up with PCP for further management.  PSYCH/NEURO:   - No acute issues. Patient noted anxiety and offered to start Citalopram and patient declined at this time. She will follow up with primary care provider.   PPX:    - She was given SCDs, IS, PPI and lovenox was started on POD#1 and was continued throughout her hospital course.  She tolerated these prophylactic interventions without incident. She will discharge home with 28 days of lovenox.    Discharge Instructions and Follow up:  Ms. Kylah Ojeda was discharged from the hospital with follow up     Discharge Diet: Regular  Discharge Activity: No lifting, driving, or strenuous exercise for 6-8 week(s). Nothing in the vagina for 6 weeks.  Discharge Follow up: Dr Dunlap as scheduled. Lissett Leonardo CNP next week for a post hospital follow up and BP monitoring/management until she sees PCP. Establish care with PCP to resume care of comorbid conditions and preventative care 1-2 weeks. RN clinic care coordinator to set up social work follow up.      Discharge Disposition:  Discharged to home    Discharge Staff: MD Glo Yarbrough, APRN, DNP, CNP  6/14/2023 12:56 PM

## 2023-06-11 NOTE — PLAN OF CARE
Pt admitted from ED. Arrived to  around 0800. Hypertensive 150's systolic and tachycardic to low 100's, otherwise VSS on RA. Denies pain. Tolerating regular diet. BG checks AC/HS. Continues to have vaginal bleeding/clots. Getting up independently in room, denies dizziness. Hgb re-check was 7.0 this evening. NPO @ midnight. Plan for OR tomorrow, surgical consent signed and placed in chart. Continue to monitor and with POC.      1820 - Pt reported sternal pain and feeling fatigued after ambulating in the dueñas. Gyn/Onc notified and ordered 1 unit of RBC.

## 2023-06-11 NOTE — PROGRESS NOTES
Gynecology Oncology Progress Note  June 11, 2023    Ms. Kylah Ojeda is a 62 year old HD#2 in the setting of vaginal bleeding and acute on chronic anemia w/ large uterine mass on imaging concerning for malignancy.     24 hour events:   - To ED with vaginal bleeding, hgb 5.8  - S/p 2UpRBCs  - US (6/10): large endometrial mass w/ vascular flow up to 10.5cm extending into cervical canal  - CT CAP (6/11) enlargement/heterogeneity of uterus centrally. No definite findings to suggest metastatic disease  - Hgb> 8.3>7.5    Subjective: Patient feeling overall well. No pain. No lightheadedness or dizziness. Bleeding somewhat improved overnight, though hasn't gotten up yet. Denies fevers, chills, chest pain, SOB. No other questions or concerns.    Objective:   BP (!) 159/55 (BP Location: Right arm)   Pulse 108   Temp 97.9  F (36.6  C) (Oral)   Resp 20   Wt 79 kg (174 lb 3.2 oz)   LMP  (LMP Unknown)   SpO2 99%     General: NAD  CV: RRR  Resp: CTAB, no increased work of breathing  Abdomen: soft, mild guarding, large abdominopelvic mass, somewhat tender  : pad w/ streaking. Not saturated   Lines/Drains: PIV      New labs/imaging-   Latest Reference Range & Units 06/10/23 23:10 06/11/23 06:46   Hemoglobin 11.7 - 15.7 g/dL 8.3 (L) 7.5 (L)      Latest Reference Range & Units Most Recent   Potassium POCT 3.4 - 5.3 mmol/L 4.1  6/10/23 18:20   Chloride 98 - 107 mmol/L  98 - 107 mmol/L 97 (L)  6/10/23 17:59  97 (L)  6/10/23 17:59   Carbon Dioxide (CO2) 22 - 29 mmol/L  22 - 29 mmol/L 18 (L)  6/10/23 17:59  20 (L)  6/10/23 17:59   Urea Nitrogen 8.0 - 23.0 mg/dL  8.0 - 23.0 mg/dL 11.7  6/10/23 17:59  11.6  6/10/23 17:59   Creatinine 0.51 - 0.95 mg/dL  0.51 - 0.95 mg/dL 0.89  6/10/23 17:59  0.87  6/10/23 17:59   GFR Estimate >60 mL/min/1.73m2  >60 mL/min/1.73m2 73  6/10/23 17:59  75  6/10/23 17:59   Calcium 8.8 - 10.2 mg/dL  8.8 - 10.2 mg/dL 9.0  6/10/23 17:59  8.9  6/10/23 17:59   Anion Gap 7 - 15 mmol/L  7 - 15 mmol/L 18  (H)  6/10/23 17:59  16 (H)  6/10/23 17:59   Albumin 3.5 - 5.2 g/dL 3.8  6/10/23 17:59   Protein Total 6.4 - 8.3 g/dL 6.0 (L)  6/10/23 17:59   Alkaline Phosphatase 35 - 104 U/L 44  6/10/23 17:59   ALT 10 - 35 U/L 12  6/10/23 17:59   AST 10 - 35 U/L 17  6/10/23 17:59   Bilirubin Total <=1.2 mg/dL 0.2  6/10/23 17:59   Calcium, Ionized Whole Blood POCT 4.4 - 5.2 mg/dL 4.7  6/10/23 18:20   Glucose 70 - 99 mg/dL  70 - 99 mg/dL 223 (H)  6/10/23 17:59  228 (H)  6/10/23 17:59   Hemoglobin A1C <5.7 % 5.7 (H)  6/10/23 23:10   Lactic Acid 0.7 - 2.0 mmol/L 2.1 (H)  6/10/23 23:10   Lactic Acid POCT <=2.0 mmol/L 4.4 (HH)  6/10/23 18:22       Assessment: Ms. Kylah Ojeda is a 62 year old HD#2 in the setting of vaginal bleeding and acute on chronic anemia w/ large uterine mass on imaging concerning for uterine malignancy, no obvious extrauterine spread.     # Uterine mass   # Concern for malignancy   - Bleeding, age, imaging and exam concerning for malignancy. Discussed suspicion with patient  - CT CAP without evidence of metastasis  - Discussed need for surgical management, plan for case tomorrow Monday 6/12      #Vaginal bleeding, acute on chronic   - Bleeding slowed overnight   - Hemodynamically stable now s/p 2 units pRBCs though hemoglobin downtrended again this morning. Will assess for need for an additional unit of blood today.   - T&C x2     #Lactic acidosis   - Due to hypovolemia. Improved with fluid and pRBC resuscitation. Repeat ordered this morning.   - Afebrile, not tachycardic. Low suspicion for infectious etiology     # Surgical planning   - HgbA1c mildly elevated, but BG in ok range; EKG with sinus tachycardia and nonspecific changes likely due to hypovolemia. Will discuss with anesthesia need for further EKG vs Echo prior to surgery     #Diabetes  - Per patient report - Not on medications   - QID BG checks w/ MSSI while in house   - Hgb A1c mildly elevated     #Hypertension   - Bps now normalized   - Not on  medications, continue to monitor    Kate Carcamo MD, MPH  Gynecologic Oncology, PGY-2  June 11, 2023 , 8:18 AM    Pager [phone]    Physician Attestation   Isaw this patient with the resident and agree with the findings and plan of care as documented in the note.  I personally reviewed vital signs, medications, and labs. The above not has been edited by me    Discussed with patient plan for surgery including exam under anesthesia, total abdominal hysterectomy, bilateral salpingooophorectomy, likely cancer staging with LND, omentectomy, possible bowel resection and ostomy (though from imaging review, I believe bowel involvement is unlikely). Discussed that if EUA concerning for cervical primary would possibly just obtain biopsies and stop procedure vs go ahead should uterine etiology remain highest likelihood. Pt voiced understanding. Concerned about lack of insurance and payment as she is unemployed. Will get social work involvement today.    Heather Narayan MD  Gynecology Oncology Fellow  June 11, 2023 , 3:54 PM

## 2023-06-11 NOTE — ANESTHESIA PREPROCEDURE EVALUATION
Anesthesia Pre-Procedure Evaluation    Patient: Kylah Ojeda   MRN: [id number] : 1960        Procedure : Procedure(s):  Hysterectomy total abdominal          No past medical history on file.   No past surgical history on file.   Allergies   Allergen Reactions     Codeine Dizziness      Social History     Tobacco Use     Smoking status: Not on file     Smokeless tobacco: Not on file   Substance Use Topics     Alcohol use: Not on file      Wt Readings from Last 1 Encounters:   23 79 kg (174 lb 3.2 oz)        Anesthesia Evaluation   Pt has not had prior anesthetic         ROS/MED HX  ENT/Pulmonary:  - neg pulmonary ROS     Neurologic:  - neg neurologic ROS     Cardiovascular:  - neg cardiovascular ROS   (+) -----Previous cardiac testing   Echo: Date: Results:    Stress Test: Date: Results:    ECG Reviewed: Date: 6/10/23 Results:  Sinus tachycardia   Cath: Date: Results:      METS/Exercise Tolerance:     Hematologic:     (+) anemia, history of blood transfusion, no previous transfusion reaction, Known PRBC Anitbodies:No     Musculoskeletal:       GI/Hepatic:  - neg GI/hepatic ROS     Renal/Genitourinary: Comment: Vaginal bleeding      Endo:     (+) type II DM, Last HgA1c: 5.7, date: 6/10/23, Not using insulin,     Psychiatric/Substance Use:  - neg psychiatric ROS     Infectious Disease:  - neg infectious disease ROS     Malignancy: Comment: Patient has bleeding uterine mass on CT.       Other:            Physical Exam    Airway        Mallampati: III   TM distance: > 3 FB   Neck ROM: full   Mouth opening: < 3 cm    Respiratory Devices and Support         Dental       (+) Minor Abnormalities - some fillings, tiny chips      Cardiovascular   cardiovascular exam normal          Pulmonary   pulmonary exam normal                OUTSIDE LABS:  CBC:   Lab Results   Component Value Date    WBC 14.2 (H) 2023    WBC 17.8 (H) 06/10/2023    HGB 7.5 (L) 2023    HGB 8.3 (L) 06/10/2023    HCT 23.0 (L)  06/11/2023    HCT 17 (L) 06/10/2023     06/11/2023     06/10/2023     BMP:   Lab Results   Component Value Date     06/10/2023     (L) 06/10/2023     (L) 06/10/2023    POTASSIUM 4.1 06/10/2023    POTASSIUM 4.3 06/10/2023    POTASSIUM 4.4 06/10/2023    CHLORIDE 97 (L) 06/10/2023    CHLORIDE 97 (L) 06/10/2023    CO2 20 (L) 06/10/2023    CO2 18 (L) 06/10/2023    BUN 11.6 06/10/2023    BUN 11.7 06/10/2023    CR 0.87 06/10/2023    CR 0.89 06/10/2023     (H) 06/11/2023     (H) 06/11/2023     COAGS:   Lab Results   Component Value Date    INR 1.04 06/10/2023     POC: No results found for: BGM, HCG, HCGS  HEPATIC:   Lab Results   Component Value Date    ALBUMIN 3.8 06/10/2023    PROTTOTAL 6.0 (L) 06/10/2023    ALT 12 06/10/2023    AST 17 06/10/2023    ALKPHOS 44 06/10/2023    BILITOTAL 0.2 06/10/2023     OTHER:   Lab Results   Component Value Date    LACT 1.1 06/11/2023    A1C 5.7 (H) 06/10/2023    TITO 8.9 06/10/2023    TITO 9.0 06/10/2023       Anesthesia Plan    ASA Status:  2   NPO Status:  NPO Appropriate    Anesthesia Type: General.     - Airway: ETT   Induction: Intravenous.   Maintenance: Balanced.   Techniques and Equipment:     - Lines/Monitors: 2nd IV, BIS ([place])     - Drips/Meds: Phenylephrine     Consents    Anesthesia Plan(s) and associated risks, benefits, and realistic alternatives discussed. Questions answered and patient/representative(s) expressed understanding.     - Discussed: Risks, Benefits and Alternatives for BOTH SEDATION and the PROCEDURE were discussed     - Discussed with:  Patient      - Extended Intubation/Ventilatory Support Discussed: No.      - Patient is DNR/DNI Status: No    Use of blood products discussed: No .     Postoperative Care    Pain management: IV analgesics, Multi-modal analgesia, Oral pain medications.   PONV prophylaxis: Ondansetron (or other 5HT-3), Dexamethasone or Solumedrol     Comments:    Other Comments: 61 yo female  with limited medical history, presented to ED with two days of vaginal bleeding. Received blood transfusion (x2U) for anemia and hypovolemia with improve Hgb. Plan to undergo total hysterectomy on 6/12/23.            Kd Herrera MD

## 2023-06-11 NOTE — H&P
Gynecology Oncology History & Physical     Kylah Ojeda  MRN: [id number]  : 1960    Date of Admission: 6/10/2023    Chief Complaint: Heavy vaginal bleeding     History of Present Illness:   Kylah Ojeda is a 62 year old  who presents to the ED with heavy vaginal bleeding.     The patient reports a long standing history of heavy vaginal bleeding that has gotten worse in the last several months. She states that ~3 years ago she thought she was going through menopause but states that after ~10 months of no vaginal bleeding, started bleeding again. She since has had episodes of bleeding ever 1-2 months, with heavy clots. This amount of bleeding prevented her from keeping a stable job. Today her bleeding became even heavier than usual. She reports saturating a pad ever 1/2 hour for the past 8 hours. She notes that just prior to arrival she started feeling dizzy and lightheaded. Now, after receiving fluids and blood products she feels much improved. She denies any fevers, chills, nausea, vomiting, dysuria, hematuria, abdominal cramping or pain. No weight loss, night sweats, early satiety. Of note, patient reports a diagnosis previously of hypertension and diabetes but states she has not received care in many years secondary to issues with insurance. She has not received cervical cancer screening in ~20 years.     No history of hormone replacement. No history of tobacco use. No history of DVTs. No family history of gynecologic cancers.     ObGyn Hx:    s/p TAB at age 26.  Last pap smear in her 20s around her AB. No screening since then  Menses: as above   Contraception: None, not sexually active (has not been for 20 years)      ROS: negative unless stated in HPI    Past Medical History:  Diabetes, untreated  Hypertension - previously on lisinopril, poorly tolerated and self-d/c'd.    Past Surgical History:   Dilation and curettage at age 26    Social History:  Lives with friend. No history of alcohol,  tobacco or illicit drug use. Currently unemployed, no insurance and worried about cost of hospital stay    Family History:   No known family history of  cancers. Family hx of diabetes    Allergies:  Allergies   Allergen Reactions     Codeine Dizziness       Physical Exam:  /68   Pulse 97   Temp 98.3  F (36.8  C)   Resp 15   LMP  (LMP Unknown)   SpO2 98%   General: NAD, well appearing   Resp: no respiratory distress, CTAB with no wheezes, rubs or rhonchi  CV: RRR, no murmur/gallop/rub  Abdomen: soft, diffusely non-tender.   : Speculum exam with removal of multiple clots. No brisk red bleeding. Normal external genitalia, no lesions. Cervix unable to be completely visualized as pulled crnanially and patient poorly tolerated extensive speculum exam. On bimanual exam, cervix palpates smooth, somewhat effaced/flushed with upper vagina and pulled cranially. Uterus largely immobile, enlarged to umbilicus. Rectovaginal exam without obvious parametrial or anorectal abnormality.   Ext: Warm, well perfused,  No edema  Neuro: appears intact grossly moving all 4 extremities equally.  Skin: No rashes or ecchymoses noted.      Labs:   Latest Reference Range & Units Most Recent   WBC 4.0 - 11.0 10e3/uL 17.8 (H)  6/10/23 17:59   Hemoglobin 11.7 - 15.7 g/dL 6.2 (LL)  6/10/23 17:59   Hemoglobin POCT 11.7 - 15.7 g/dL 5.8 (LL)  6/10/23 18:20   Hematocrit 35.0 - 47.0 % 20.3 (L)  6/10/23 17:59   Hematocrit POCT 35 - 47 % 17 (L)  6/10/23 18:20   Platelet Count 150 - 450 10e3/uL 430  6/10/23 17:59   RBC Count 3.80 - 5.20 10e6/uL 2.20 (L)  6/10/23 17:59   MCV 78 - 100 fL 92  6/10/23 17:59   MCH 26.5 - 33.0 pg 28.2  6/10/23 17:59   MCHC 31.5 - 36.5 g/dL 30.5 (L)  6/10/23 17:59   RDW 10.0 - 15.0 % 15.3 (H)  6/10/23 17:59      Latest Reference Range & Units Most Recent   Sodium 136 - 145 mmol/L 133 (L)  6/10/23 17:59   Sodium POCT 133 - 144 mmol/L 133  6/10/23 18:20   Potassium 3.4 - 5.3 mmol/L 4.3  6/10/23 17:59   Potassium POCT  3.4 - 5.3 mmol/L 4.1  6/10/23 18:20   Chloride 98 - 107 mmol/L 97 (L)  6/10/23 17:59   Carbon Dioxide (CO2) 22 - 29 mmol/L 20 (L)  6/10/23 17:59   Urea Nitrogen 8.0 - 23.0 mg/dL 11.6  6/10/23 17:59   Creatinine 0.51 - 0.95 mg/dL 0.87  6/10/23 17:59   GFR Estimate >60 mL/min/1.73m2 75  6/10/23 17:59   Calcium 8.8 - 10.2 mg/dL 8.9  6/10/23 17:59   Anion Gap 7 - 15 mmol/L 16 (H)  6/10/23 17:59   Calcium, Ionized Whole Blood POCT 4.4 - 5.2 mg/dL 4.7  6/10/23 18:20   Glucose 70 - 99 mg/dL 228 (H)  6/10/23 17:59   Lactic Acid 0.7 - 2.0 mmol/L 2.1 (H)  6/10/23 23:10   Lactic Acid POCT <=2.0 mmol/L 4.4 (HH)  6/10/23 18:22       Imaging:  EXAMINATION: US PELVIC TRANSABDOMINAL, 6/10/2023 8:32 PM      COMPARISON: None.     HISTORY: Heavy vaginal bleeding     TECHNIQUE: The pelvis was scanned in standard fashion with a  transabdominal transducer(s) using both grey scale and limited color  Doppler techniques. Due to bleeding and large size of uterus,  transvaginal imaging was deferred.     FINDINGS:  The uterus measures 19.2 x 7.8 x 11.5 cm.  A large endometrial  heterogeneous hyperechoic lesion measuring 10.5 x 5.8 x 9.6 cm with  scattered areas of vascular flow. Lobulated echogenic mass is seen in  the cervical canal, which is likely extension of the endometrial mass.  In addition there is free fluid in the cervical canal likely due to  hemorrhage.     Ovaries not visualized.     Unremarkable bladder.                                                                      IMPRESSION: Large endometrial mass with scattered areas of vascular  flow measuring up to 10.5 cm, extending to the cervical canal, is  highly suspicious for endometrial carcinoma. Recommend tissue  sampling.    Impression:  Kylah Ojeda is a 62 year old  presenting to the ED with acute on chronic heavy, abnormal uterine bleeding. Patient initially tachycardic and hypotensive with a hgb of 5.8. Bleeding slowed and patient stabilized after IVF and 2U of  pRBCs. Ultrasound significant for large uterus measuring 19.2x7.8x11.6 w/ large heterogenous hyperechoic lesions with scattered areas of vascular flow highly concerning for malignancy. Will admit to the gynecologic oncology service for further workup and management.     # Uterine mass   # Concern for malignancy   - Bleeding, age, imaging and exam concerning for uterine vs cervical malignancy. Discussed suspicion with patient who expressed understanding   - CT CAP ordered for further evaluation   - Discussed need for surgical management  - NPO at midnight in case need for urgent surgery     #Vaginal bleeding, acute on chronic   - Bleeding slowed on admission   - Hemodynamically stable now s/p 2 units pRBCs    #Lactic acidosis   - Improved with fluid resuscitation. Likely secondary to volume status. Afebrile, no tachycardia.   - Repeat ordered this morning. UA pending     # Surgical planning   - Anesthesia consult for pre-operative evaluation given chronic medical conditions and lack of medical care  - Patient able to walk a few flights of stairs just 1 week ago so likely functions status is okay.     #Diabetes  - Per patient report. Not on medications   - QID BG checks w/ MSSI while in house   - Hgb A1c pending     #Hypertension   - Bps now normalized   - Not on medications, continue to monitor.     Patient discussed and evaluated with Dr. Sylvain Carcamo MD   OBGYN resident PGY2  Gyn pager: [phone]  06/11/2023 12:54 AM     Physician Attestation   I saw this patient with the resident and agree with the findings and plan of care as documented in the note.  I personally reviewed vital signs, medications, and labs. The above not has been edited by me    Comments: On exam patient with some blood clot in canal, unable to completely visualize cervix due to poor tolerance of exam and cervix retracted. Palpably effaced and pulled cranially. Uterus enlarged to umbilicus and relatively immobile. Rectovaginal  exam limited due to tolerance, no obvious parametrial or rectal involvement. Plan CT CAP for further characterization. Ongoing bleeding monitoring and surgical planning for likely uterine malignancy. If concern for cervical origin on further imaging, will reevaluate for management plan. Patient discussed with Dr Dunlap.    Heather Narayan MD  Gynecology Oncology Fellow      I saw and evaluated the patient on 6/12/23. Prior to this, plan reviewed with me. I agree with the findings and the plan of care as documented in  and Sylvain 's note.    Reviewed imaging and exam findings with patient this morning and discussed my concern for uterine malignant as cause of bleeding. Less likely cervical cancer . Discussed need for surgery for diagnosis and treatment. Discussed surgical risks not limited to bleeding, infection, damage to surrounding organs, need for blood transfusions and ICU stay.       Karena Dunlap MD  Gynecologic Oncology  Pager [phone]

## 2023-06-11 NOTE — PLAN OF CARE
Time 9292-4943    Vitals: VSS on RA  Activity: SBA  Pain: Denies  Neuro: A&OX4    Cardiac:  WDL  Respiratory:  Denies cough/SOB  GI/: Voids spontaneously, No BM this shift, Continues to have vaginal bleeding  Diet: NPO  Lines: R PIV infusing NS @ 125ml/hr  Skin/Wounds: CDI  Labs/imaging: Reviewed, , 106        New changes this shift:  Stable overnight. Urine sample sent. CT Chest/Abd/Pelvis completed.     Plan: Continue BG Q4H and IVF.     Continue to monitor and follow POC     Goal Outcome Evaluation:      Plan of Care Reviewed With: patient    Overall Patient Progress: no changeOverall Patient Progress: no change    Outcome Evaluation: Continual vaginal bleeding, Denies pain & N/V
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
## 2023-06-12 ENCOUNTER — ANCILLARY PROCEDURE (OUTPATIENT)
Dept: ULTRASOUND IMAGING | Facility: CLINIC | Age: 63
End: 2023-06-12
Attending: ANESTHESIOLOGY
Payer: MEDICAID

## 2023-06-12 ENCOUNTER — ANESTHESIA (OUTPATIENT)
Dept: SURGERY | Facility: CLINIC | Age: 63
End: 2023-06-12
Payer: MEDICAID

## 2023-06-12 LAB
ERYTHROCYTE [DISTWIDTH] IN BLOOD BY AUTOMATED COUNT: 17.1 % (ref 10–15)
GLUCOSE BLDC GLUCOMTR-MCNC: 111 MG/DL (ref 70–99)
GLUCOSE BLDC GLUCOMTR-MCNC: 122 MG/DL (ref 70–99)
GLUCOSE BLDC GLUCOMTR-MCNC: 124 MG/DL (ref 70–99)
GLUCOSE BLDC GLUCOMTR-MCNC: 167 MG/DL (ref 70–99)
GLUCOSE BLDC GLUCOMTR-MCNC: 190 MG/DL (ref 70–99)
HCT VFR BLD AUTO: 25.9 % (ref 35–47)
HGB BLD-MCNC: 8.3 G/DL (ref 11.7–15.7)
HGB BLD-MCNC: 8.7 G/DL (ref 11.7–15.7)
HOLD SPECIMEN: NORMAL
MCH RBC QN AUTO: 28.9 PG (ref 26.5–33)
MCHC RBC AUTO-ENTMCNC: 32 G/DL (ref 31.5–36.5)
MCV RBC AUTO: 90 FL (ref 78–100)
PLATELET # BLD AUTO: 221 10E3/UL (ref 150–450)
RBC # BLD AUTO: 2.87 10E6/UL (ref 3.8–5.2)
WBC # BLD AUTO: 9 10E3/UL (ref 4–11)

## 2023-06-12 PROCEDURE — 36415 COLL VENOUS BLD VENIPUNCTURE: CPT | Performed by: STUDENT IN AN ORGANIZED HEALTH CARE EDUCATION/TRAINING PROGRAM

## 2023-06-12 PROCEDURE — 710N000010 HC RECOVERY PHASE 1, LEVEL 2, PER MIN: Performed by: OBSTETRICS & GYNECOLOGY

## 2023-06-12 PROCEDURE — 258N000003 HC RX IP 258 OP 636

## 2023-06-12 PROCEDURE — 88307 TISSUE EXAM BY PATHOLOGIST: CPT | Mod: 26 | Performed by: PATHOLOGY

## 2023-06-12 PROCEDURE — 36415 COLL VENOUS BLD VENIPUNCTURE: CPT | Performed by: OBSTETRICS & GYNECOLOGY

## 2023-06-12 PROCEDURE — 250N000025 HC SEVOFLURANE, PER MIN: Performed by: OBSTETRICS & GYNECOLOGY

## 2023-06-12 PROCEDURE — 250N000009 HC RX 250: Performed by: STUDENT IN AN ORGANIZED HEALTH CARE EDUCATION/TRAINING PROGRAM

## 2023-06-12 PROCEDURE — 120N000002 HC R&B MED SURG/OB UMMC

## 2023-06-12 PROCEDURE — 88309 TISSUE EXAM BY PATHOLOGIST: CPT | Mod: 26 | Performed by: PATHOLOGY

## 2023-06-12 PROCEDURE — 85018 HEMOGLOBIN: CPT | Performed by: STUDENT IN AN ORGANIZED HEALTH CARE EDUCATION/TRAINING PROGRAM

## 2023-06-12 PROCEDURE — 258N000003 HC RX IP 258 OP 636: Performed by: STUDENT IN AN ORGANIZED HEALTH CARE EDUCATION/TRAINING PROGRAM

## 2023-06-12 PROCEDURE — 0UT90ZZ RESECTION OF UTERUS, OPEN APPROACH: ICD-10-PCS | Performed by: OBSTETRICS & GYNECOLOGY

## 2023-06-12 PROCEDURE — 999N000141 HC STATISTIC PRE-PROCEDURE NURSING ASSESSMENT: Performed by: OBSTETRICS & GYNECOLOGY

## 2023-06-12 PROCEDURE — 88305 TISSUE EXAM BY PATHOLOGIST: CPT | Mod: 26 | Performed by: PATHOLOGY

## 2023-06-12 PROCEDURE — 88341 IMHCHEM/IMCYTCHM EA ADD ANTB: CPT | Mod: 26 | Performed by: PATHOLOGY

## 2023-06-12 PROCEDURE — 88112 CYTOPATH CELL ENHANCE TECH: CPT | Mod: TC | Performed by: OBSTETRICS & GYNECOLOGY

## 2023-06-12 PROCEDURE — 85027 COMPLETE CBC AUTOMATED: CPT | Performed by: OBSTETRICS & GYNECOLOGY

## 2023-06-12 PROCEDURE — 88305 TISSUE EXAM BY PATHOLOGIST: CPT | Mod: TC | Performed by: OBSTETRICS & GYNECOLOGY

## 2023-06-12 PROCEDURE — 88112 CYTOPATH CELL ENHANCE TECH: CPT | Mod: 26 | Performed by: PATHOLOGY

## 2023-06-12 PROCEDURE — 272N000001 HC OR GENERAL SUPPLY STERILE: Performed by: OBSTETRICS & GYNECOLOGY

## 2023-06-12 PROCEDURE — 0UT70ZZ RESECTION OF BILATERAL FALLOPIAN TUBES, OPEN APPROACH: ICD-10-PCS | Performed by: OBSTETRICS & GYNECOLOGY

## 2023-06-12 PROCEDURE — 250N000011 HC RX IP 250 OP 636

## 2023-06-12 PROCEDURE — 88331 PATH CONSLTJ SURG 1 BLK 1SPC: CPT | Mod: TC | Performed by: OBSTETRICS & GYNECOLOGY

## 2023-06-12 PROCEDURE — 250N000013 HC RX MED GY IP 250 OP 250 PS 637: Performed by: STUDENT IN AN ORGANIZED HEALTH CARE EDUCATION/TRAINING PROGRAM

## 2023-06-12 PROCEDURE — 370N000017 HC ANESTHESIA TECHNICAL FEE, PER MIN: Performed by: OBSTETRICS & GYNECOLOGY

## 2023-06-12 PROCEDURE — 0UT20ZZ RESECTION OF BILATERAL OVARIES, OPEN APPROACH: ICD-10-PCS | Performed by: OBSTETRICS & GYNECOLOGY

## 2023-06-12 PROCEDURE — 250N000011 HC RX IP 250 OP 636: Performed by: STUDENT IN AN ORGANIZED HEALTH CARE EDUCATION/TRAINING PROGRAM

## 2023-06-12 PROCEDURE — 250N000011 HC RX IP 250 OP 636: Performed by: ANESTHESIOLOGY

## 2023-06-12 PROCEDURE — 07TC0ZZ RESECTION OF PELVIS LYMPHATIC, OPEN APPROACH: ICD-10-PCS | Performed by: OBSTETRICS & GYNECOLOGY

## 2023-06-12 PROCEDURE — 250N000013 HC RX MED GY IP 250 OP 250 PS 637

## 2023-06-12 PROCEDURE — 360N000077 HC SURGERY LEVEL 4, PER MIN: Performed by: OBSTETRICS & GYNECOLOGY

## 2023-06-12 PROCEDURE — 88331 PATH CONSLTJ SURG 1 BLK 1SPC: CPT | Mod: 26 | Performed by: PATHOLOGY

## 2023-06-12 PROCEDURE — 88342 IMHCHEM/IMCYTCHM 1ST ANTB: CPT | Mod: 26 | Performed by: PATHOLOGY

## 2023-06-12 RX ORDER — KETOROLAC TROMETHAMINE 30 MG/ML
INJECTION, SOLUTION INTRAMUSCULAR; INTRAVENOUS PRN
Status: DISCONTINUED | OUTPATIENT
Start: 2023-06-12 | End: 2023-06-12

## 2023-06-12 RX ORDER — HYDROMORPHONE HYDROCHLORIDE 1 MG/ML
0.2 INJECTION, SOLUTION INTRAMUSCULAR; INTRAVENOUS; SUBCUTANEOUS EVERY 5 MIN PRN
Status: DISCONTINUED | OUTPATIENT
Start: 2023-06-12 | End: 2023-06-12 | Stop reason: HOSPADM

## 2023-06-12 RX ORDER — IBUPROFEN 600 MG/1
600 TABLET, FILM COATED ORAL EVERY 6 HOURS
Status: DISCONTINUED | OUTPATIENT
Start: 2023-06-12 | End: 2023-06-14

## 2023-06-12 RX ORDER — FENTANYL CITRATE 50 UG/ML
50 INJECTION, SOLUTION INTRAMUSCULAR; INTRAVENOUS EVERY 5 MIN PRN
Status: DISCONTINUED | OUTPATIENT
Start: 2023-06-12 | End: 2023-06-12 | Stop reason: HOSPADM

## 2023-06-12 RX ORDER — ONDANSETRON 4 MG/1
4 TABLET, ORALLY DISINTEGRATING ORAL EVERY 30 MIN PRN
Status: DISCONTINUED | OUTPATIENT
Start: 2023-06-12 | End: 2023-06-12 | Stop reason: HOSPADM

## 2023-06-12 RX ORDER — METRONIDAZOLE 500 MG/100ML
500 INJECTION, SOLUTION INTRAVENOUS
Status: COMPLETED | OUTPATIENT
Start: 2023-06-12 | End: 2023-06-12

## 2023-06-12 RX ORDER — SODIUM CHLORIDE, SODIUM LACTATE, POTASSIUM CHLORIDE, CALCIUM CHLORIDE 600; 310; 30; 20 MG/100ML; MG/100ML; MG/100ML; MG/100ML
INJECTION, SOLUTION INTRAVENOUS CONTINUOUS
Status: DISCONTINUED | OUTPATIENT
Start: 2023-06-12 | End: 2023-06-12 | Stop reason: HOSPADM

## 2023-06-12 RX ORDER — ONDANSETRON 2 MG/ML
4 INJECTION INTRAMUSCULAR; INTRAVENOUS EVERY 30 MIN PRN
Status: DISCONTINUED | OUTPATIENT
Start: 2023-06-12 | End: 2023-06-12 | Stop reason: HOSPADM

## 2023-06-12 RX ORDER — OXYCODONE HYDROCHLORIDE 5 MG/1
5 TABLET ORAL EVERY 4 HOURS PRN
Status: DISCONTINUED | OUTPATIENT
Start: 2023-06-12 | End: 2023-06-14 | Stop reason: HOSPADM

## 2023-06-12 RX ORDER — AMOXICILLIN 250 MG
1 CAPSULE ORAL 2 TIMES DAILY
Status: DISCONTINUED | OUTPATIENT
Start: 2023-06-12 | End: 2023-06-14 | Stop reason: HOSPADM

## 2023-06-12 RX ORDER — NALOXONE HYDROCHLORIDE 0.4 MG/ML
0.4 INJECTION, SOLUTION INTRAMUSCULAR; INTRAVENOUS; SUBCUTANEOUS
Status: DISCONTINUED | OUTPATIENT
Start: 2023-06-12 | End: 2023-06-14 | Stop reason: HOSPADM

## 2023-06-12 RX ORDER — LABETALOL HYDROCHLORIDE 5 MG/ML
10 INJECTION, SOLUTION INTRAVENOUS
Status: DISCONTINUED | OUTPATIENT
Start: 2023-06-12 | End: 2023-06-12 | Stop reason: HOSPADM

## 2023-06-12 RX ORDER — KETOROLAC TROMETHAMINE 30 MG/ML
15 INJECTION, SOLUTION INTRAMUSCULAR; INTRAVENOUS
Status: DISCONTINUED | OUTPATIENT
Start: 2023-06-12 | End: 2023-06-12 | Stop reason: HOSPADM

## 2023-06-12 RX ORDER — SODIUM CHLORIDE, SODIUM LACTATE, POTASSIUM CHLORIDE, CALCIUM CHLORIDE 600; 310; 30; 20 MG/100ML; MG/100ML; MG/100ML; MG/100ML
INJECTION, SOLUTION INTRAVENOUS CONTINUOUS
Status: DISCONTINUED | OUTPATIENT
Start: 2023-06-12 | End: 2023-06-13

## 2023-06-12 RX ORDER — NALOXONE HYDROCHLORIDE 0.4 MG/ML
0.2 INJECTION, SOLUTION INTRAMUSCULAR; INTRAVENOUS; SUBCUTANEOUS
Status: DISCONTINUED | OUTPATIENT
Start: 2023-06-12 | End: 2023-06-14 | Stop reason: HOSPADM

## 2023-06-12 RX ORDER — HYDROXYZINE HYDROCHLORIDE 25 MG/1
25 TABLET, FILM COATED ORAL EVERY 6 HOURS PRN
Status: DISCONTINUED | OUTPATIENT
Start: 2023-06-12 | End: 2023-06-12 | Stop reason: HOSPADM

## 2023-06-12 RX ORDER — LIDOCAINE HYDROCHLORIDE 20 MG/ML
INJECTION, SOLUTION INFILTRATION; PERINEURAL PRN
Status: DISCONTINUED | OUTPATIENT
Start: 2023-06-12 | End: 2023-06-12

## 2023-06-12 RX ORDER — ACETAMINOPHEN 325 MG/1
975 TABLET ORAL ONCE
Status: DISCONTINUED | OUTPATIENT
Start: 2023-06-12 | End: 2023-06-12 | Stop reason: HOSPADM

## 2023-06-12 RX ORDER — CEFAZOLIN SODIUM/WATER 2 G/20 ML
2 SYRINGE (ML) INTRAVENOUS SEE ADMIN INSTRUCTIONS
Status: DISCONTINUED | OUTPATIENT
Start: 2023-06-12 | End: 2023-06-12 | Stop reason: HOSPADM

## 2023-06-12 RX ORDER — ONDANSETRON 2 MG/ML
INJECTION INTRAMUSCULAR; INTRAVENOUS PRN
Status: DISCONTINUED | OUTPATIENT
Start: 2023-06-12 | End: 2023-06-12

## 2023-06-12 RX ORDER — FENTANYL CITRATE 50 UG/ML
INJECTION, SOLUTION INTRAMUSCULAR; INTRAVENOUS PRN
Status: DISCONTINUED | OUTPATIENT
Start: 2023-06-12 | End: 2023-06-12

## 2023-06-12 RX ORDER — HYDROMORPHONE HYDROCHLORIDE 1 MG/ML
0.4 INJECTION, SOLUTION INTRAMUSCULAR; INTRAVENOUS; SUBCUTANEOUS EVERY 5 MIN PRN
Status: DISCONTINUED | OUTPATIENT
Start: 2023-06-12 | End: 2023-06-12 | Stop reason: HOSPADM

## 2023-06-12 RX ORDER — SIMETHICONE 80 MG
80 TABLET,CHEWABLE ORAL 4 TIMES DAILY PRN
Status: DISCONTINUED | OUTPATIENT
Start: 2023-06-12 | End: 2023-06-14 | Stop reason: HOSPADM

## 2023-06-12 RX ORDER — NICOTINE POLACRILEX 4 MG
15-30 LOZENGE BUCCAL
Status: DISCONTINUED | OUTPATIENT
Start: 2023-06-12 | End: 2023-06-12

## 2023-06-12 RX ORDER — CEFAZOLIN SODIUM/WATER 2 G/20 ML
2 SYRINGE (ML) INTRAVENOUS
Status: COMPLETED | OUTPATIENT
Start: 2023-06-12 | End: 2023-06-12

## 2023-06-12 RX ORDER — DEXTROSE MONOHYDRATE 25 G/50ML
25-50 INJECTION, SOLUTION INTRAVENOUS
Status: DISCONTINUED | OUTPATIENT
Start: 2023-06-12 | End: 2023-06-12

## 2023-06-12 RX ORDER — ENOXAPARIN SODIUM 100 MG/ML
40 INJECTION SUBCUTANEOUS EVERY 24 HOURS
Status: DISCONTINUED | OUTPATIENT
Start: 2023-06-13 | End: 2023-06-14 | Stop reason: HOSPADM

## 2023-06-12 RX ORDER — LIDOCAINE 40 MG/G
CREAM TOPICAL
Status: DISCONTINUED | OUTPATIENT
Start: 2023-06-12 | End: 2023-06-14 | Stop reason: HOSPADM

## 2023-06-12 RX ORDER — ACETAMINOPHEN 325 MG/1
975 TABLET ORAL ONCE
Status: COMPLETED | OUTPATIENT
Start: 2023-06-12 | End: 2023-06-12

## 2023-06-12 RX ORDER — FENTANYL CITRATE 50 UG/ML
25 INJECTION, SOLUTION INTRAMUSCULAR; INTRAVENOUS EVERY 5 MIN PRN
Status: DISCONTINUED | OUTPATIENT
Start: 2023-06-12 | End: 2023-06-12 | Stop reason: HOSPADM

## 2023-06-12 RX ORDER — LIDOCAINE 40 MG/G
CREAM TOPICAL
Status: DISCONTINUED | OUTPATIENT
Start: 2023-06-12 | End: 2023-06-12 | Stop reason: HOSPADM

## 2023-06-12 RX ORDER — DIMENHYDRINATE 50 MG/ML
25 INJECTION, SOLUTION INTRAMUSCULAR; INTRAVENOUS
Status: DISCONTINUED | OUTPATIENT
Start: 2023-06-12 | End: 2023-06-12 | Stop reason: HOSPADM

## 2023-06-12 RX ORDER — METRONIDAZOLE 500 MG/100ML
INJECTION, SOLUTION INTRAVENOUS PRN
Status: DISCONTINUED | OUTPATIENT
Start: 2023-06-12 | End: 2023-06-12

## 2023-06-12 RX ORDER — BUPIVACAINE HYDROCHLORIDE 2.5 MG/ML
INJECTION, SOLUTION EPIDURAL; INFILTRATION; INTRACAUDAL
Status: DISCONTINUED | OUTPATIENT
Start: 2023-06-12 | End: 2023-06-12

## 2023-06-12 RX ORDER — POLYETHYLENE GLYCOL 3350 17 G/17G
17 POWDER, FOR SOLUTION ORAL DAILY
Status: DISCONTINUED | OUTPATIENT
Start: 2023-06-12 | End: 2023-06-14 | Stop reason: HOSPADM

## 2023-06-12 RX ORDER — AMOXICILLIN 250 MG
2 CAPSULE ORAL 2 TIMES DAILY
Status: DISCONTINUED | OUTPATIENT
Start: 2023-06-12 | End: 2023-06-14 | Stop reason: HOSPADM

## 2023-06-12 RX ORDER — CALCIUM CARBONATE 500 MG/1
500 TABLET, CHEWABLE ORAL 4 TIMES DAILY PRN
Status: DISCONTINUED | OUTPATIENT
Start: 2023-06-12 | End: 2023-06-14 | Stop reason: HOSPADM

## 2023-06-12 RX ORDER — ACETAMINOPHEN 325 MG/1
650 TABLET ORAL EVERY 6 HOURS
Status: DISCONTINUED | OUTPATIENT
Start: 2023-06-12 | End: 2023-06-14 | Stop reason: HOSPADM

## 2023-06-12 RX ORDER — OXYCODONE HYDROCHLORIDE 10 MG/1
10 TABLET ORAL EVERY 4 HOURS PRN
Status: DISCONTINUED | OUTPATIENT
Start: 2023-06-12 | End: 2023-06-14 | Stop reason: HOSPADM

## 2023-06-12 RX ORDER — PROPOFOL 10 MG/ML
INJECTION, EMULSION INTRAVENOUS PRN
Status: DISCONTINUED | OUTPATIENT
Start: 2023-06-12 | End: 2023-06-12

## 2023-06-12 RX ORDER — HYDRALAZINE HYDROCHLORIDE 20 MG/ML
10 INJECTION INTRAMUSCULAR; INTRAVENOUS EVERY 4 HOURS PRN
Status: COMPLETED | OUTPATIENT
Start: 2023-06-12 | End: 2023-06-12

## 2023-06-12 RX ORDER — SODIUM CHLORIDE, SODIUM LACTATE, POTASSIUM CHLORIDE, CALCIUM CHLORIDE 600; 310; 30; 20 MG/100ML; MG/100ML; MG/100ML; MG/100ML
INJECTION, SOLUTION INTRAVENOUS CONTINUOUS PRN
Status: DISCONTINUED | OUTPATIENT
Start: 2023-06-12 | End: 2023-06-12

## 2023-06-12 RX ORDER — DEXAMETHASONE SODIUM PHOSPHATE 4 MG/ML
INJECTION, SOLUTION INTRA-ARTICULAR; INTRALESIONAL; INTRAMUSCULAR; INTRAVENOUS; SOFT TISSUE PRN
Status: DISCONTINUED | OUTPATIENT
Start: 2023-06-12 | End: 2023-06-12

## 2023-06-12 RX ORDER — BISACODYL 10 MG
10 SUPPOSITORY, RECTAL RECTAL DAILY
Status: DISCONTINUED | OUTPATIENT
Start: 2023-06-12 | End: 2023-06-14 | Stop reason: HOSPADM

## 2023-06-12 RX ADMIN — OXYCODONE HYDROCHLORIDE 5 MG: 5 TABLET ORAL at 20:34

## 2023-06-12 RX ADMIN — PROPOFOL 150 MG: 10 INJECTION, EMULSION INTRAVENOUS at 08:23

## 2023-06-12 RX ADMIN — MIDAZOLAM 1 MG: 1 INJECTION INTRAMUSCULAR; INTRAVENOUS at 08:13

## 2023-06-12 RX ADMIN — PROPOFOL 20 MG: 10 INJECTION, EMULSION INTRAVENOUS at 11:11

## 2023-06-12 RX ADMIN — DOCUSATE SODIUM 50 MG AND SENNOSIDES 8.6 MG 2 TABLET: 8.6; 5 TABLET, FILM COATED ORAL at 20:34

## 2023-06-12 RX ADMIN — PHENYLEPHRINE HYDROCHLORIDE 200 MCG: 10 INJECTION INTRAVENOUS at 08:50

## 2023-06-12 RX ADMIN — SODIUM CHLORIDE, POTASSIUM CHLORIDE, SODIUM LACTATE AND CALCIUM CHLORIDE: 600; 310; 30; 20 INJECTION, SOLUTION INTRAVENOUS at 09:15

## 2023-06-12 RX ADMIN — Medication 10 MG: at 10:22

## 2023-06-12 RX ADMIN — METRONIDAZOLE 500 MG: 500 INJECTION, SOLUTION INTRAVENOUS at 08:13

## 2023-06-12 RX ADMIN — PHENYLEPHRINE HYDROCHLORIDE 200 MCG: 10 INJECTION INTRAVENOUS at 08:57

## 2023-06-12 RX ADMIN — ONDANSETRON 4 MG: 2 INJECTION INTRAMUSCULAR; INTRAVENOUS at 11:12

## 2023-06-12 RX ADMIN — DEXAMETHASONE SODIUM PHOSPHATE 8 MG: 4 INJECTION, SOLUTION INTRA-ARTICULAR; INTRALESIONAL; INTRAMUSCULAR; INTRAVENOUS; SOFT TISSUE at 08:23

## 2023-06-12 RX ADMIN — PHENYLEPHRINE HYDROCHLORIDE 100 MCG: 10 INJECTION INTRAVENOUS at 08:30

## 2023-06-12 RX ADMIN — HYDROMORPHONE HYDROCHLORIDE 0.25 MG: 1 INJECTION, SOLUTION INTRAMUSCULAR; INTRAVENOUS; SUBCUTANEOUS at 10:58

## 2023-06-12 RX ADMIN — SODIUM CHLORIDE, POTASSIUM CHLORIDE, SODIUM LACTATE AND CALCIUM CHLORIDE: 600; 310; 30; 20 INJECTION, SOLUTION INTRAVENOUS at 08:13

## 2023-06-12 RX ADMIN — PROPOFOL 30 MG: 10 INJECTION, EMULSION INTRAVENOUS at 11:06

## 2023-06-12 RX ADMIN — FENTANYL CITRATE 50 MCG: 50 INJECTION, SOLUTION INTRAMUSCULAR; INTRAVENOUS at 11:36

## 2023-06-12 RX ADMIN — IBUPROFEN 600 MG: 600 TABLET, FILM COATED ORAL at 17:09

## 2023-06-12 RX ADMIN — POLYETHYLENE GLYCOL 3350 17 G: 17 POWDER, FOR SOLUTION ORAL at 14:19

## 2023-06-12 RX ADMIN — LIDOCAINE HYDROCHLORIDE 80 MG: 20 INJECTION, SOLUTION INFILTRATION; PERINEURAL at 08:23

## 2023-06-12 RX ADMIN — ACETAMINOPHEN 975 MG: 325 TABLET ORAL at 07:36

## 2023-06-12 RX ADMIN — PROPOFOL 20 MG: 10 INJECTION, EMULSION INTRAVENOUS at 11:21

## 2023-06-12 RX ADMIN — HYDRALAZINE HYDROCHLORIDE 10 MG: 20 INJECTION INTRAMUSCULAR; INTRAVENOUS at 22:13

## 2023-06-12 RX ADMIN — SODIUM CHLORIDE, POTASSIUM CHLORIDE, SODIUM LACTATE AND CALCIUM CHLORIDE: 600; 310; 30; 20 INJECTION, SOLUTION INTRAVENOUS at 22:03

## 2023-06-12 RX ADMIN — Medication 10 MG: at 10:41

## 2023-06-12 RX ADMIN — KETOROLAC TROMETHAMINE 15 MG: 30 INJECTION, SOLUTION INTRAMUSCULAR at 11:16

## 2023-06-12 RX ADMIN — SODIUM CHLORIDE, POTASSIUM CHLORIDE, SODIUM LACTATE AND CALCIUM CHLORIDE: 600; 310; 30; 20 INJECTION, SOLUTION INTRAVENOUS at 08:37

## 2023-06-12 RX ADMIN — Medication 50 MG: at 08:23

## 2023-06-12 RX ADMIN — PROPOFOL 20 MG: 10 INJECTION, EMULSION INTRAVENOUS at 11:03

## 2023-06-12 RX ADMIN — ACETAMINOPHEN 650 MG: 325 TABLET, FILM COATED ORAL at 14:18

## 2023-06-12 RX ADMIN — PROPOFOL 30 MG: 10 INJECTION, EMULSION INTRAVENOUS at 11:52

## 2023-06-12 RX ADMIN — METRONIDAZOLE 500 MG: 500 INJECTION, SOLUTION INTRAVENOUS at 07:54

## 2023-06-12 RX ADMIN — PHENYLEPHRINE HYDROCHLORIDE 0.5 MCG/KG/MIN: 10 INJECTION INTRAVENOUS at 08:45

## 2023-06-12 RX ADMIN — PROPOFOL 20 MG: 10 INJECTION, EMULSION INTRAVENOUS at 11:45

## 2023-06-12 RX ADMIN — Medication 2 G: at 08:23

## 2023-06-12 RX ADMIN — BUPIVACAINE HYDROCHLORIDE 60 ML: 2.5 INJECTION, SOLUTION EPIDURAL; INFILTRATION; INTRACAUDAL; PERINEURAL at 12:10

## 2023-06-12 RX ADMIN — HYDROMORPHONE HYDROCHLORIDE 0.25 MG: 1 INJECTION, SOLUTION INTRAMUSCULAR; INTRAVENOUS; SUBCUTANEOUS at 11:15

## 2023-06-12 RX ADMIN — Medication 10 MG: at 09:40

## 2023-06-12 RX ADMIN — PROPOFOL 20 MG: 10 INJECTION, EMULSION INTRAVENOUS at 11:32

## 2023-06-12 RX ADMIN — PROPOFOL 20 MG: 10 INJECTION, EMULSION INTRAVENOUS at 11:38

## 2023-06-12 RX ADMIN — FENTANYL CITRATE 50 MCG: 50 INJECTION, SOLUTION INTRAMUSCULAR; INTRAVENOUS at 08:23

## 2023-06-12 RX ADMIN — ACETAMINOPHEN 650 MG: 325 TABLET, FILM COATED ORAL at 20:34

## 2023-06-12 RX ADMIN — PROPOFOL 20 MG: 10 INJECTION, EMULSION INTRAVENOUS at 11:27

## 2023-06-12 RX ADMIN — SODIUM CHLORIDE, POTASSIUM CHLORIDE, SODIUM LACTATE AND CALCIUM CHLORIDE: 600; 310; 30; 20 INJECTION, SOLUTION INTRAVENOUS at 13:27

## 2023-06-12 ASSESSMENT — ACTIVITIES OF DAILY LIVING (ADL)
ADLS_ACUITY_SCORE: 24
ADLS_ACUITY_SCORE: 20
ADLS_ACUITY_SCORE: 20
ADLS_ACUITY_SCORE: 24
ADLS_ACUITY_SCORE: 20
ADLS_ACUITY_SCORE: 24

## 2023-06-12 NOTE — PROGRESS NOTES
Gynecologic Oncology Postoperative Progress Note  6/12/2023    S: Patient reports she is doing well postoperatively. Pain is well controlled with current pain regimen. Has not yet tried to ambulate. Has christiansen in place. Tolerating PO (liquids) without nausea or vomiting. Denies chest pain, shortness of breath, dizziness, or other concerns at this time.     O:  Vitals:    06/11/23 2144 06/12/23 0645 06/12/23 0711 06/12/23 0736   BP: (!) 144/49 (!) 174/73 (!) 194/76 (!) 194/69   BP Location:  Left arm Left arm    Pulse: 102 101 102    Resp: 16 16 18    Temp: 98.9  F (37.2  C) 98.7  F (37.1  C) 98.1  F (36.7  C)    TempSrc:  Oral Oral    SpO2: 97% 100% 97%    Weight:       Height:           Gen: Resting comfortably on the cart  Cardio: RRR, no murmurs  Resp: CTAB, non-labored breathing  Abdomen: Soft, appropriately tender, bandage over incision with small amount of bloody drainage   Extremities: Non-tender, trace edema    A: 62 year old POD#0 s/p JAVIER, BSO, pelvic and para-aortic node dissection. Doing well in the immediate postoperative period.    Dz: Uterine mass    - Frozen pathology: Endometrioid endometrial adenocarcinoma   FEN: Advance as tolerated  Pain: Tylenol, Ibuprofen, PRN oxycodone   Heme: Hgb 8.3 >  mL    GI: Bowel regimen   : Christiansen in place   PPx: SCDs, IS      Dispo: Admit back to floor.  Will reassess discharge pending postoperative goals    Dell Hernandez   Gynecologic Oncology, MS4  06/12/23 12:13 PM     Resident/Fellow Attestation   I, Arianne Best, was present with the medical student who participated in the service and in the documentation of the note.  I have verified the history and personally performed the physical exam and medical decision making.  I agree with the assessment and plan of care as documented in the note.  I have reviewed the note and made changes as necessary.    Arianne Best DO, MS  [facility]  Gynecology Oncology  Resident, PGY-3  06/12/2023 10:26 PM    To reach the GYNECOLOGY ONCOLOGY team for this patient, please page [phone]

## 2023-06-12 NOTE — PLAN OF CARE
Patient came to 7C from the PACU at 2pm. Patient denies pain, abdominal incision dressing has a couple small spots that are marked. Vitals are stable, a bit hypertensive which is her baseline. Continue post op cares.

## 2023-06-12 NOTE — CONSULTS
Linking Consult. See Bobbi FARMER note from 6/11/23.        GAGANDEEP Beaulieu, [facility]   Covering 7C SW  Ph: [phone]

## 2023-06-12 NOTE — PROGRESS NOTES
"Gynecology Oncology Progress Note  June 12, 2023    Ms. Kylah Ojeda is a 62 year old HD#3 in the setting of vaginal bleeding and acute on chronic anemia w/ large uterine mass on imaging concerning for malignancy.     24 hour events:   - S/p 2 U pRBCS  - Called OR, add on case 6/12   - S/p surgery consent (including hyst form)  - Hgb 7 > 1u pRBC       Subjective: Patient feeling overall well. No pain. No lightheadedness or dizziness. Has not eaten since midnight in anticipation of procedure today. Denies fevers, chills, chest pain, SOB. No other questions or concerns.    Objective:   BP (!) 174/73 (BP Location: Left arm)   Pulse 101   Temp 98.7  F (37.1  C) (Oral)   Resp 16   Ht 1.48 m (4' 10.27\")   Wt 79 kg (174 lb 3.2 oz)   LMP  (LMP Unknown)   SpO2 100%   BMI 36.07 kg/m      General: NAD  CV: RRR  Resp: CTAB, no increased work of breathing  Abdomen: soft, mild guarding, large abdominopelvic mass, somewhat tender  : pad w/ streaking. Not saturated   Lines/Drains: PIV    New labs/imaging-   Latest Reference Range & Units 06/11/23 17:26   Hemoglobin 11.7 - 15.7 g/dL 7.0 (L)   (L): Data is abnormally low      Assessment: Ms. Kylah Ojeda is a 62 year old HD#3 in the setting of vaginal bleeding and acute on chronic anemia w/ large uterine mass on imaging concerning for uterine malignancy, no obvious extrauterine spread.     # Uterine mass   # Concern for malignancy   - Bleeding, age, imaging and exam concerning for malignancy. Discussed suspicion with patient  - CT CAP without evidence of metastasis  - Discussed need for surgical management, plan for case later this morning  - SW consult ordered as patient is without insurance and without employment given medical state and has concerns surrounding this. Appreciate recommendations.     # Surgical planning   - HgbA1c mildly elevated, but BG in ok range; EKG with sinus tachycardia and nonspecific changes likely due to hypovolemia. Will discuss with anesthesia " need for further EKG vs Echo prior to surgery  - s/p consent for surgery: exam under anesthesia, total abdominal hysterectomy, bilateral salpingooophorectomy, likely cancer staging with LND, omentectomy, possible bowel resection and ostomy     #Vaginal bleeding, acute on chronic   - Bleeding slowed overnight   - Hemodynamically stable now s/p 3 units pRBCs  - T&C x2     #Lactic acidosis   - Due to hypovolemia. Improved with fluid and pRBC resuscitation. Repeat ordered this morning.   - Afebrile, not tachycardic. Low suspicion for infectious etiology    #Diabetes  - Per patient report - Not on medications   - QID BG checks w/ MSSI while in house   - Hgb A1c mildly elevated     #Hypertension   - Normotensive  - Not on medications, continue to monitor    Arianne Best DO, MS  [facility]  Gynecology Oncology Resident, PGY-3  06/12/2023 7:02 AM    To reach the GYNECOLOGY ONCOLOGY team for this patient, please page [phone]    See h&P for my attestation     Karena Dunlap MD  Gynecologic Oncology  Pager [phone]

## 2023-06-12 NOTE — ANESTHESIA PROCEDURE NOTES
Airway       Patient location during procedure: OR       Procedure Start/Stop Times: 6/12/2023 8:37 AM  Staff -        Anesthesiologist:  Donaldo Ac MD       Resident/Fellow: Liane Kolb MD       Other Anesthesia Staff: Ollie Peoples MD       Performed By: resident  Consent for Airway        Urgency: elective  Indications and Patient Condition       Indications for airway management: brett-procedural       Induction type:intravenous       Mask difficulty assessment: 1 - vent by mask    Final Airway Details       Final airway type: endotracheal airway       Successful airway: ETT - single  Endotracheal Airway Details        ETT size (mm): 7.0       Cuffed: yes       Successful intubation technique: video laryngoscopy       VL Blade Size: MAC 3       Grade View of Cords: 1       Adjucts: stylet       Position: Right       Measured from: gums/teeth       Secured at (cm): 22       Bite block used: None    Post intubation assessment        Placement verified by: capnometry, equal breath sounds and chest rise        Number of attempts at approach: 1       Number of other approaches attempted: 0       Secured with: pink tape       Ease of procedure: easy       Dentition: Intact and Unchanged    Medication(s) Administered   Medication Administration Time: 6/12/2023 8:37 AM

## 2023-06-12 NOTE — ANESTHESIA PROCEDURE NOTES
"TAP Procedure Note    Pre-Procedure   Staff -        Anesthesiologist:  Paolo Oquendo MD       Resident/Fellow: Liane Kolb MD       Performed By: resident       Location: OR       Pre-Anesthestic Checklist: patient identified, IV checked, site marked, risks and benefits discussed, informed consent, monitors and equipment checked, pre-op evaluation, at physician/surgeon's request and post-op pain management  Timeout:       Correct Patient: Yes        Correct Procedure: Yes        Correct Site: Yes        Correct Position: Yes        Correct Laterality: Yes        Site Marked: Yes  Procedure Documentation  Procedure: TAP       Diagnosis: POST OPERATIVE PAIN       Laterality: bilateral       Patient Position: supine       Patient Prep/Sterile Barriers: sterile gloves, mask       Skin prep: Chloraprep       Needle Type: short bevel       Needle Gauge: 21.        Needle Length (millimeters): 110        Ultrasound guided       1. Ultrasound was used to identify targeted nerve, plexus, vascular marker, or fascial plane and place a needle adjacent to it in real-time.       2. Ultrasound was used to visualize the spread of anesthetic in close proximity to the above referenced structure.       3. A permanent image is entered into the patient's record.    Assessment/Narrative         The placement was negative for: blood aspirated, painful injection and site bleeding       Paresthesias: No.       Bolus given via needle..        Secured via.        Insertion/Infusion Method: Single Shot       Complications: none       Injection made incrementally with aspirations every 5 mL.    Medication(s) Administered   Bupivacaine 0.25% PF (Infiltration) - Infiltration   60 mL - 6/12/2023 12:10:00 PM    FOR [facility] ([facility]) ONLY:   Pain Team Contact information: please page the Pain Team Via [employer]. Search \"Pain\". During daytime hours, please page the attending first. At night please page the resident first.      "

## 2023-06-12 NOTE — PLAN OF CARE
Goal Outcome Evaluation:  A&Ox4. HTN and Tachycardic - w/in parameters. RA. UAL. NPO. Denies nausea. Denies pain. No gas. No BM. Voiding spontaneously. Vaginal bleeding and passing clots - team aware. BG ACHS - 113, 122. Plan for OR procedure today - time unknown.

## 2023-06-12 NOTE — ANESTHESIA POSTPROCEDURE EVALUATION
Patient: Kylah Ojeda    Procedure: Procedure(s):  Hysterectomy total abdominal, bilateral salpingo-oophorectomy, pelvic and para-aortic node dissection, combined       Anesthesia Type:  General    Note:  Disposition: Admission   Postop Pain Control: Uneventful            Sign Out: Well controlled pain   PONV: No   Neuro/Psych: Uneventful            Sign Out: Acceptable/Baseline neuro status   Airway/Respiratory: Uneventful            Sign Out: Acceptable/Baseline resp. status   CV/Hemodynamics: Uneventful            Sign Out: Acceptable CV status; No obvious hypovolemia; No obvious fluid overload   Other NRE: NONE   DID A NON-ROUTINE EVENT OCCUR? No    Event details/Postop Comments:  - Hgb ordered for post-operative evaluation. Patient hemodynamically stable.            Last vitals:  Vitals Value Taken Time   /83 06/12/23 1245   Temp     Pulse 96 06/12/23 1300   Resp 9 06/12/23 1300   SpO2 98 % 06/12/23 1300   Vitals shown include unvalidated device data.    Electronically Signed By: José Luis Liu MD  June 12, 2023  1:01 PM

## 2023-06-12 NOTE — PROVIDER NOTIFICATION
Spoke with the CRNA and Anesthesiologist, Dr. Ac, at bedside and notified the team that her SBP is elevated at 194. No new orders, except recheck the blood pressure and no need to call with the next blood pressure, the team will be back again.

## 2023-06-12 NOTE — PROGRESS NOTES
Admitted/transferred from: PACU  2 RN full   skin assessment completed by Reina Buchanan, RN and Sissy Briggs, RN.  Skin assessment finding: issues found midline abdominal dressing, few tiny spots of drainage, old IV site bandage right foot.   Interventions/actions: other Continue to monitor     Will continue to monitor.

## 2023-06-13 LAB
ANION GAP SERPL CALCULATED.3IONS-SCNC: 9 MMOL/L (ref 7–15)
BUN SERPL-MCNC: 7.5 MG/DL (ref 8–23)
CALCIUM SERPL-MCNC: 8.2 MG/DL (ref 8.8–10.2)
CHLORIDE SERPL-SCNC: 107 MMOL/L (ref 98–107)
CREAT SERPL-MCNC: 0.68 MG/DL (ref 0.51–0.95)
DEPRECATED HCO3 PLAS-SCNC: 25 MMOL/L (ref 22–29)
ERYTHROCYTE [DISTWIDTH] IN BLOOD BY AUTOMATED COUNT: 18.7 % (ref 10–15)
GFR SERPL CREATININE-BSD FRML MDRD: >90 ML/MIN/1.73M2
GLUCOSE BLDC GLUCOMTR-MCNC: 109 MG/DL (ref 70–99)
GLUCOSE BLDC GLUCOMTR-MCNC: 109 MG/DL (ref 70–99)
GLUCOSE BLDC GLUCOMTR-MCNC: 116 MG/DL (ref 70–99)
GLUCOSE BLDC GLUCOMTR-MCNC: 147 MG/DL (ref 70–99)
GLUCOSE BLDC GLUCOMTR-MCNC: 154 MG/DL (ref 70–99)
GLUCOSE SERPL-MCNC: 116 MG/DL (ref 70–99)
HCT VFR BLD AUTO: 24.2 % (ref 35–47)
HGB BLD-MCNC: 7.6 G/DL (ref 11.7–15.7)
MCH RBC QN AUTO: 28.9 PG (ref 26.5–33)
MCHC RBC AUTO-ENTMCNC: 31.4 G/DL (ref 31.5–36.5)
MCV RBC AUTO: 92 FL (ref 78–100)
PATH REPORT.COMMENTS IMP SPEC: NORMAL
PATH REPORT.FINAL DX SPEC: NORMAL
PATH REPORT.GROSS SPEC: NORMAL
PATH REPORT.MICROSCOPIC SPEC OTHER STN: NORMAL
PATH REPORT.RELEVANT HX SPEC: NORMAL
PLATELET # BLD AUTO: 227 10E3/UL (ref 150–450)
POTASSIUM SERPL-SCNC: 3.8 MMOL/L (ref 3.4–5.3)
RBC # BLD AUTO: 2.63 10E6/UL (ref 3.8–5.2)
SODIUM SERPL-SCNC: 141 MMOL/L (ref 136–145)
WBC # BLD AUTO: 12.7 10E3/UL (ref 4–11)

## 2023-06-13 PROCEDURE — 999N000248 HC STATISTIC IV INSERT WITH US BY RN

## 2023-06-13 PROCEDURE — 250N000011 HC RX IP 250 OP 636: Performed by: OBSTETRICS & GYNECOLOGY

## 2023-06-13 PROCEDURE — 250N000013 HC RX MED GY IP 250 OP 250 PS 637: Performed by: STUDENT IN AN ORGANIZED HEALTH CARE EDUCATION/TRAINING PROGRAM

## 2023-06-13 PROCEDURE — 85027 COMPLETE CBC AUTOMATED: CPT

## 2023-06-13 PROCEDURE — 99024 POSTOP FOLLOW-UP VISIT: CPT | Mod: GC | Performed by: OBSTETRICS & GYNECOLOGY

## 2023-06-13 PROCEDURE — 80048 BASIC METABOLIC PNL TOTAL CA: CPT

## 2023-06-13 PROCEDURE — 120N000002 HC R&B MED SURG/OB UMMC

## 2023-06-13 PROCEDURE — 258N000003 HC RX IP 258 OP 636

## 2023-06-13 PROCEDURE — 250N000013 HC RX MED GY IP 250 OP 250 PS 637

## 2023-06-13 PROCEDURE — 250N000011 HC RX IP 250 OP 636

## 2023-06-13 PROCEDURE — 36415 COLL VENOUS BLD VENIPUNCTURE: CPT

## 2023-06-13 RX ORDER — HYDRALAZINE HYDROCHLORIDE 20 MG/ML
10 INJECTION INTRAMUSCULAR; INTRAVENOUS EVERY 4 HOURS PRN
Status: COMPLETED | OUTPATIENT
Start: 2023-06-13 | End: 2023-06-13

## 2023-06-13 RX ORDER — AMLODIPINE BESYLATE 5 MG/1
5 TABLET ORAL DAILY
Status: DISCONTINUED | OUTPATIENT
Start: 2023-06-13 | End: 2023-06-13

## 2023-06-13 RX ORDER — AMLODIPINE BESYLATE 5 MG/1
5 TABLET ORAL DAILY
Status: DISCONTINUED | OUTPATIENT
Start: 2023-06-13 | End: 2023-06-14

## 2023-06-13 RX ADMIN — DOCUSATE SODIUM 50 MG AND SENNOSIDES 8.6 MG 2 TABLET: 8.6; 5 TABLET, FILM COATED ORAL at 19:47

## 2023-06-13 RX ADMIN — ENOXAPARIN SODIUM 40 MG: 40 INJECTION SUBCUTANEOUS at 11:12

## 2023-06-13 RX ADMIN — IBUPROFEN 600 MG: 600 TABLET, FILM COATED ORAL at 06:25

## 2023-06-13 RX ADMIN — ACETAMINOPHEN 650 MG: 325 TABLET, FILM COATED ORAL at 19:47

## 2023-06-13 RX ADMIN — AMLODIPINE BESYLATE 5 MG: 5 TABLET ORAL at 11:44

## 2023-06-13 RX ADMIN — ACETAMINOPHEN 650 MG: 325 TABLET, FILM COATED ORAL at 02:24

## 2023-06-13 RX ADMIN — ACETAMINOPHEN 650 MG: 325 TABLET, FILM COATED ORAL at 08:29

## 2023-06-13 RX ADMIN — ACETAMINOPHEN 650 MG: 325 TABLET, FILM COATED ORAL at 14:51

## 2023-06-13 RX ADMIN — IBUPROFEN 600 MG: 600 TABLET, FILM COATED ORAL at 00:26

## 2023-06-13 RX ADMIN — SIMETHICONE 80 MG: 80 TABLET, CHEWABLE ORAL at 21:59

## 2023-06-13 RX ADMIN — POLYETHYLENE GLYCOL 3350 17 G: 17 POWDER, FOR SOLUTION ORAL at 08:30

## 2023-06-13 RX ADMIN — HYDRALAZINE HYDROCHLORIDE 10 MG: 20 INJECTION INTRAMUSCULAR; INTRAVENOUS at 03:16

## 2023-06-13 RX ADMIN — DOCUSATE SODIUM 50 MG AND SENNOSIDES 8.6 MG 2 TABLET: 8.6; 5 TABLET, FILM COATED ORAL at 08:29

## 2023-06-13 RX ADMIN — HYDRALAZINE HYDROCHLORIDE 10 MG: 20 INJECTION INTRAMUSCULAR; INTRAVENOUS at 16:44

## 2023-06-13 RX ADMIN — IBUPROFEN 600 MG: 600 TABLET, FILM COATED ORAL at 18:30

## 2023-06-13 RX ADMIN — IBUPROFEN 600 MG: 600 TABLET, FILM COATED ORAL at 11:44

## 2023-06-13 RX ADMIN — BISACODYL 10 MG: 10 SUPPOSITORY RECTAL at 09:01

## 2023-06-13 RX ADMIN — SODIUM CHLORIDE, POTASSIUM CHLORIDE, SODIUM LACTATE AND CALCIUM CHLORIDE: 600; 310; 30; 20 INJECTION, SOLUTION INTRAVENOUS at 06:11

## 2023-06-13 ASSESSMENT — ACTIVITIES OF DAILY LIVING (ADL)
ADLS_ACUITY_SCORE: 27
ADLS_ACUITY_SCORE: 27
ADLS_ACUITY_SCORE: 20
ADLS_ACUITY_SCORE: 27
ADLS_ACUITY_SCORE: 20
ADLS_ACUITY_SCORE: 20
ADLS_ACUITY_SCORE: 27
ADLS_ACUITY_SCORE: 24
ADLS_ACUITY_SCORE: 27
ADLS_ACUITY_SCORE: 20

## 2023-06-13 NOTE — PROGRESS NOTES
CLINICAL NUTRITION SERVICES    Per RN, pt requesting to meet with dietitian re: low sodium diet.      Met with patient, pt reports she didn't necessarily need to meet with dietitian as she already follows low sodium diet and feels comfortable with it; pt had questions this morning re: menu options on low sodium diet, but pt already had her questions answered before my visit today.  Pt accepted additional education handout Low-Sodium Nutrition Therapy from Nutrition Care Manual.     Pt politely declined need for additional nutrition ed at this time; let pt know of inpatient RD availability if questions arise.    RD will continue to follow per LOS protocol and/or if re-consulted.  Patient to ask any further nutrition-related questions before discharge.  In addition, pt may request outpatient RD appointment.     Myranda Weiss RD, , LD  Weekday Pager: [phone]  Weekday Units covered: 7C (all beds) and 7B (beds 7230 through 7240)  Weekend/Holiday RD Pager: [phone]

## 2023-06-13 NOTE — PROVIDER NOTIFICATION
7D 7406-01 Lynette Pt /68 left arm, rechecked 186/63. Pt got hydralazine in the evening, but med discontinued (completed), no PRN available please advise, thank you, Maurice [phone]       [facility].

## 2023-06-13 NOTE — PLAN OF CARE
"Goal Outcome Evaluation:  Vitals: Hypertensive, PRN Hydralazine given, afebrile  Blood pressure (!) 158/57, pulse 95, temperature 97.7  F (36.5  C), temperature source Oral, resp. rate 16, height 1.48 m (4' 10.27\"), weight 79 kg (174 lb 3.2 oz), SpO2 94 %.   Neuros: A/O x 4  IV: R PIV LR infusing 125 ml/hr   Labs/Electrolytes: Reviewed, BS at 2:00 am 154.  Resp/trach: Denied shortness of breat, Sat's mid 90's on RA..   Diet: Regular, denies nausea   Bowel status: No BM during the shift, not passing eyad yet.  : Enough urine output via Wilson.  Skin: Midline incision, abdominal binder on,  dressing intact.   Pain: Pain managed by scedule Tylenol and Ibuprofen.   Activity: Stand by assist. Patient encouraged to get out of bed.    Plan: Continue to monitor per POC.                         "

## 2023-06-13 NOTE — PROGRESS NOTES
"Brief Interval Progress Note:     Presented to patient room to discuss elevated blood glucose readings    S:  Patient reports that she is doing okay postoperatively.  Does complain of feeling very mild pressure associated with the Wilson but denies any other pain at this time.  She states that she overall feels really well, and has no questions or complaints at this time.  In regards to her blood sugar readings she states that she has never needed to take insulin with her history of diabetes.  States her diabetes has always been controlled with diet and exercise.  She would be open to taking insulin if that is what is needed for her recovery.    O:  BP (!) 191/84 (BP Location: Left arm)   Pulse 106   Temp 98.1  F (36.7  C) (Oral)   Resp 16   Ht 1.48 m (4' 10.27\")   Wt 79 kg (174 lb 3.2 oz)   LMP  (LMP Unknown)   SpO2 94%   BMI 36.07 kg/m     Gen: Tired, but well-appearing.  No acute distress    A/P:   62 year old POD#0 s/p JAVIER, BSO, pelvic and para-aortic node dissection. Doing well post-operatively.  Discussed with patient that due to elevated blood glucose readings we would like to give her insulin.  Did explain that she was administered Decadron which is known to cause elevation in blood glucose.  She states she is amenable to taking this medication.  Orders placed.  All questions were answered and addressed    Arianne Best DO, MS  Obstetrics, Gynecology & Women's Health   Resident, PGY-3  06/12/2023 10:23 PM    "

## 2023-06-13 NOTE — PLAN OF CARE
"Goal Outcome Evaluation:  POD0 Hysterectomy total abdominal, bilateral salpingo-oophorectomy, bilateral pelvic and paraaortic lymph node dissection.     VS: BP (!) 159/56 (BP Location: Left arm)   Pulse 106   Temp 98.1  F (36.7  C) (Oral)   Resp 16   Ht 1.48 m (4' 10.27\")   Wt 79 kg (174 lb 3.2 oz)   LMP  (LMP Unknown)   SpO2 94%   BMI 36.07 kg/m    Neuro: A&O X4, able to make needs known  Cardiac: Hypertensive in the 190s. Administered PRN hydralazine  Respiratory: On RA, report of SOB  GI/: Wilson in place with adequate output, no gas or BM at this time.  Diet/appetite: Regular diet and tolerating well, no report of nausea  Activity: Not OOB this shift. Patient yet to dangle  Pain: Mild abdominal pain managed with tylenol, ibuprofen, and oxycodone  Skin/drains: WDL except for midline incision and lap sites  Lines: R  piv infusing LR @125ml/hr.   Patient reclined insulin for BS of 167 at dinner time and said she had never had insulin and that she manages her BS with diet and exercise (team aware). Bedtime BS was 190, no insulin coverage needed for this reading.   No change to patient's status this shift, continue to monitor for ROBF and manage pain. Continue POC.   "

## 2023-06-13 NOTE — PROGRESS NOTES
"Gynecology Oncology Progress Note  June 13, 2023    Ms. Kylah Ojeda is a 62 year old HD# 4/POD# 1 s/p JAVIER, BSO, pelvic and para-aortic node dissection.     Dz: Endometrioid endometrial adenocarcinoma on frozen     24 hour events:   - Surgery as noted above     Subjective: Pain well controlled. Has not yet ambulated. Tolerating PO without nausea or vomiting. Might have passed flatus yesterday, but none overnight. No bowel movement. Christiansen . Denies fevers, chills, chest pain, SOB. No other questions or concerns.    Objective:   BP (!) 158/57 (BP Location: Left arm)   Pulse 95   Temp 97.7  F (36.5  C) (Oral)   Resp 16   Ht 1.48 m (4' 10.27\")   Wt 79 kg (174 lb 3.2 oz)   LMP  (LMP Unknown)   SpO2 94%   BMI 36.07 kg/m      General: In NAD  CV: RRR  Resp: CTAB, no increased work of breathing  Abdomen: Soft, appropriately tender, non- distended  Incision: Dressing on with minimal bloody drainage   Extremities: nontender, trace edema, SCDs in place  Lines/Drains: Christiansen in place     I/Os  (Yesterday // Since Midnight)  IV: 3620 mL // 120 mL  PO: 240 mL // 120 mL  Urine 2640 mL // 1000 mL    Labs/Imaging:   Reviewed   CBC RESULTS: Recent Labs   Lab Test 06/13/23  0742   WBC 12.7*   RBC 2.63*   HGB 7.6*   HCT 24.2*   MCV 92   MCH 28.9   MCHC 31.4*   RDW 18.7*            Assessment: Ms. Kylah Ojeda is a 62 year old HD# 4/POD# 1 s/p JAVIER, BSO, pelvic and para-aortic node dissection. Doing well postoperatively     #Uterine mass   - Endometrioid endometrial adenocarcinoma on frozen pathology   - Final pathology pending     # Postoperative State   FEN: Regular diet   Pain: Tylenol, Ibuprofen, PRN Oxycodone   Heme: Hgb 8.3 >  mL >7.6  GI: Bowel regimen   : Christiansen in place; plan to pull christiansen today   PPx: SCDs, IS; plan to start Lovenox today     #HTN   No PTA meds. Elevated blood pressures into the 190's but averages around 150's/50's. Required Hydralazine 10 mg x2 over the last 24 hours.   - Plan to initiate " amlodipine today    #Diabetes   No PTA meds. Managed with diet. Last A1c 5.7 from 6/10. BG post surgery ranging from 110's-190's. Of note, patient did receive decadron intra-op yesterday, which explains her slightly elevated BG. Patient declined insulin overnight.   - QID BG checks with MSSI       Dell Hernandez   Gynecologic Oncology, MS4  06/13/23 5:28 AM       I saw and evaluated the patient. I agree with the findings and the plan of care as documented in Dell Hernandez's note. Stable postoperatively. No indication for further transfusion at thistime.     Karena Dunlap MD  Gynecologic Oncology  Pager [phone]

## 2023-06-13 NOTE — PLAN OF CARE
Goal Outcome Evaluation:  A&Ox4. Capno - in place. HTN and Tachycardic - w/in parameters. 2L NC. assist of 1. Reg diet - tolerating well. Denies nausea. Pain controlled w/ tylenol and avdil. No gas. No BM. christiansen removed at 1300 - pt due to void. midline incision - dressing marked. BG ACHS. Pt dangled, stood, and walked. R arm swollen - likely due to IV infiltration - team aware and saw pt at bedside.

## 2023-06-13 NOTE — OP NOTE
Procedure Date: 06/12/2023    PREOPERATIVE DIAGNOSES:     1.  Hemorrhagic shock with hemoglobin of 6.2.  2.  Enlarged uterus suspicious for endometrial carcinoma.  3.  Diabetes mellitus.  4.  Obesity with a body mass index (BMI) of 36.    POSTOPERATIVE DIAGNOSES:  1.  Hemorrhagic shock with hemoglobin of 6.2.  2.  Enlarged uterus suspicious for endometrial carcinoma.  3.  Diabetes mellitus.  4.  Obesity with a body mass index (BMI) of 36.    PROCEDURES PERFORMED:    1.  Exploratory laparotomy.  2.  Total abdominal hysterectomy with bilateral salpingo-oophorectomy.  3.  Bilateral pelvic and paraaortic lymph node dissection.    SURGEON:  Karena Dunlap MD.    ASSISTANT:    1.  Dr. Shaheen Narayan (GYN Oncology fellow).    ANESTHESIA:  General with postoperative TAP blocks.    ESTIMATED BLOOD LOSS:  250 mL.    COMPLICATIONS:  None.    SPECIMENS REMOVED:    1.  Pelvic washings.  2.  Uterus, cervix, bilateral fallopian tubes, and ovaries.  3.  Bilateral pelvic and paraaortic lymph nodes.    INDICATIONS:  The patient is a 62-year-old woman who presented to the emergency department with heavy vaginal bleeding and hemorrhagic shock.  Hemoglobin was notable for 6.2.  She received 3 units of blood.  Exam and imaging was consistent with an enlarged uterus as the source of her bleeding and this was highly suspicious for uterine cancer.  She was therefore brought to the operating room today for further surgical diagnosis and management.    FINDINGS:  On bimanual exam, she had normal external genitalia.  The vagina was smooth without masses.  The cervix was small.  The uterus was mobile, but enlarged to the level of her umbilicus.  On laparotomy, she had no ascites.  She had no obvious evidence of metastatic disease.  Her uterus was globally enlarged.  Her tubes and ovaries appeared normal bilaterally.    DESCRIPTION OF PROCEDURE:  After informed consent, the patient was brought to the operating room where general  anesthesia was administered.  She was prepped and draped in the dorsal lithotomy position and a Wilson catheter was placed in her bladder.  She was given Ancef and Flagyl for preoperative prophylactic antibiotics.  SCDs were placed on her lower extremities.    I started by making a vertical midline incision from her pubic symphysis to above her umbilicus.  This was carried down to the underlying fascia, which was incised in the midline.  The fascia was extended superiorly and inferiorly.  The rectus muscles were  in the midline.  The peritoneum was identified and entered sharply.  The abdomen and pelvis were inspected with the above findings noted.  A Bookwalter retractor was used to retract the bowels into the upper abdomen for better visualization of the pelvis.    At this point, I started the hysterectomy.  I grasped the bilateral cornua with Kocher clamps.  I opened the right retroperitoneal space through the right round ligament, identified the ureter deep in the right retroperitoneal space.  I skeletonized the right IP, clamped, cut, and doubly suture ligated this pedicle.  In a similar fashion on the left hand side, I opened the left retroperitoneal space through the left round ligament, identified the left ureter deep in the left retroperitoneal space.  I skeletonized the left IP, I clamped, cut, and suture ligated this pedicle.  We then created a bladder flap by tenting the anterior peritoneum and gently dissecting the bladder off the anterior cervix and vagina.  We then skeletonized the bilateral uterine arteries.  We clamped, cut, and suture ligated these pedicles.  We then used a series of straight parametrial clamps to clamp, cut, and suture ligate the parametrium.  Ultimately, I was able to place curved parametrial clamps underneath the cervix, cut on these clamps, and  the specimen from the upper vagina.  At this point, it was evident there was still some residual cervix as the cervix  had become quite attenuated. Therefore, I removed the curved parametrial clamps, I further developed the bladder flap, I then used additional straight parametrial clamps along the paracolpos and parametrium, and then ultimately placed curved parametrial clamps under the cervix.  I cut on the clamps and the remaining cervix was then sent for permanent pathology.  The vaginal angles were suture ligated and the cuff was closed with 0 Vicryl suture.  The pelvis was irrigated and very hemostatic.  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    I reinspected the right retroperitoneal space, identified the right external iliac artery and vein, as well as the right ureter and the right obturator nerve deep in the obturator space. From proximal to distal along the right external iliac artery,  I removed the lymph nodes to the level of the circumflex iliac vein, all with good visualization of the genitofemoral nerve.  I then retracted the ureter medially and I reidentified the right obturator nerve.  I then removed the lymph nodes from the obturator space using clips as well as cautery for hemostasis.  I then extended my peritoneal incision in order to further expose the right periaortic space.  I then removed lymph nodes along the right common iliac artery and vein to the level of the aorta, and these were sent as right periaortic lymph nodes.  I did place some Surgiflo in this lymph node bed for additional hemostasis.    In a similar fashion, the left retroperitoneal space was reidentified, identified the left external iliac artery and vein, as well as the left ureter as well as the left obturator nerve deep in the obturator space, and removed the lymph nodes along the left external iliac artery from proximal to distal to the level of the circumflex iliac vein.  I then retracted the left external iliac vein cephalad and visualized the left obturator  nerve and then removed the lymph nodes deep in the space.  I then extended my peritoneal incision cephalad and retracted the infundibulopelvic vascular complex medially as well as the ureter medially in order to expose the left periaortic lymph nodes.  These were then removed along the left common iliac artery to the level of the aorta and these were sent for permanent pathology.  I placed Surgiflo in this area as well for additional hemostasis.    I then ran the bowel in its entirety, which was normal.  The appendix was normal.  I then reirrigated the abdomen and pelvis and all pedicles were very hemostatic.  We then closed the fascia from superior to inferior with looped #1 PDS suture, meeting in the middle.  We then copiously irrigated the subcutaneous tissues and closed this with 3-0 Vicryl in a running fashion.  The skin was then closed with 4-0 Vicryl in a subcuticular fashion and a sterile dressing was placed over the incision.  The patient was awoken from anesthesia and she was brought to the recovery room in a stable condition.    Karena Dunlap MD        D: 2023   T: 2023   MT:     Name:     DURGA BROCK  MRN:      [id number]        Account:        [id number]   :      1960           Procedure Date: 2023     Document: [id number]

## 2023-06-14 VITALS
BODY MASS INDEX: 36.57 KG/M2 | RESPIRATION RATE: 18 BRPM | HEIGHT: 58 IN | OXYGEN SATURATION: 95 % | SYSTOLIC BLOOD PRESSURE: 173 MMHG | TEMPERATURE: 98.2 F | DIASTOLIC BLOOD PRESSURE: 60 MMHG | HEART RATE: 95 BPM | WEIGHT: 174.2 LBS

## 2023-06-14 LAB
BASOPHILS # BLD AUTO: 0 10E3/UL (ref 0–0.2)
BASOPHILS NFR BLD AUTO: 0 %
EOSINOPHIL # BLD AUTO: 0.2 10E3/UL (ref 0–0.7)
EOSINOPHIL NFR BLD AUTO: 2 %
ERYTHROCYTE [DISTWIDTH] IN BLOOD BY AUTOMATED COUNT: 17.9 % (ref 10–15)
GLUCOSE BLDC GLUCOMTR-MCNC: 130 MG/DL (ref 70–99)
GLUCOSE BLDC GLUCOMTR-MCNC: 71 MG/DL (ref 70–99)
GLUCOSE BLDC GLUCOMTR-MCNC: 99 MG/DL (ref 70–99)
HCT VFR BLD AUTO: 26.7 % (ref 35–47)
HGB BLD-MCNC: 8.3 G/DL (ref 11.7–15.7)
IMM GRANULOCYTES # BLD: 0.1 10E3/UL
IMM GRANULOCYTES NFR BLD: 1 %
LACTATE SERPL-SCNC: 1.2 MMOL/L (ref 0.7–2)
LYMPHOCYTES # BLD AUTO: 1.4 10E3/UL (ref 0.8–5.3)
LYMPHOCYTES NFR BLD AUTO: 14 %
MCH RBC QN AUTO: 28.5 PG (ref 26.5–33)
MCHC RBC AUTO-ENTMCNC: 31.1 G/DL (ref 31.5–36.5)
MCV RBC AUTO: 92 FL (ref 78–100)
MONOCYTES # BLD AUTO: 0.6 10E3/UL (ref 0–1.3)
MONOCYTES NFR BLD AUTO: 6 %
NEUTROPHILS # BLD AUTO: 7.7 10E3/UL (ref 1.6–8.3)
NEUTROPHILS NFR BLD AUTO: 77 %
NRBC # BLD AUTO: 0.1 10E3/UL
NRBC BLD AUTO-RTO: 1 /100
PLATELET # BLD AUTO: 254 10E3/UL (ref 150–450)
RBC # BLD AUTO: 2.91 10E6/UL (ref 3.8–5.2)
WBC # BLD AUTO: 9.9 10E3/UL (ref 4–11)

## 2023-06-14 PROCEDURE — 83605 ASSAY OF LACTIC ACID: CPT | Performed by: OBSTETRICS & GYNECOLOGY

## 2023-06-14 PROCEDURE — 99024 POSTOP FOLLOW-UP VISIT: CPT | Mod: GC | Performed by: OBSTETRICS & GYNECOLOGY

## 2023-06-14 PROCEDURE — 36415 COLL VENOUS BLD VENIPUNCTURE: CPT | Performed by: OBSTETRICS & GYNECOLOGY

## 2023-06-14 PROCEDURE — 250N000013 HC RX MED GY IP 250 OP 250 PS 637: Performed by: STUDENT IN AN ORGANIZED HEALTH CARE EDUCATION/TRAINING PROGRAM

## 2023-06-14 PROCEDURE — 250N000013 HC RX MED GY IP 250 OP 250 PS 637

## 2023-06-14 PROCEDURE — 85025 COMPLETE CBC W/AUTO DIFF WBC: CPT | Performed by: NURSE PRACTITIONER

## 2023-06-14 PROCEDURE — 250N000013 HC RX MED GY IP 250 OP 250 PS 637: Performed by: NURSE PRACTITIONER

## 2023-06-14 PROCEDURE — 250N000011 HC RX IP 250 OP 636

## 2023-06-14 RX ORDER — AMLODIPINE BESYLATE 10 MG/1
10 TABLET ORAL DAILY
Qty: 30 TABLET | Refills: 0 | Status: SHIPPED | OUTPATIENT
Start: 2023-06-15 | End: 2023-07-05

## 2023-06-14 RX ORDER — ACETAMINOPHEN 325 MG/1
650 TABLET ORAL EVERY 6 HOURS PRN
Qty: 40 TABLET | Refills: 0 | Status: SHIPPED | OUTPATIENT
Start: 2023-06-14 | End: 2023-06-20

## 2023-06-14 RX ORDER — ACETAMINOPHEN 325 MG/1
650 TABLET ORAL EVERY 6 HOURS
Qty: 60 TABLET | Refills: 0 | Status: SHIPPED | OUTPATIENT
Start: 2023-06-14 | End: 2023-06-14

## 2023-06-14 RX ORDER — IBUPROFEN 600 MG/1
600 TABLET, FILM COATED ORAL EVERY 6 HOURS PRN
Status: DISCONTINUED | OUTPATIENT
Start: 2023-06-14 | End: 2023-06-14 | Stop reason: HOSPADM

## 2023-06-14 RX ORDER — OXYCODONE HYDROCHLORIDE 5 MG/1
5 TABLET ORAL EVERY 6 HOURS PRN
Qty: 12 TABLET | Refills: 0 | Status: SHIPPED | OUTPATIENT
Start: 2023-06-14 | End: 2023-07-05

## 2023-06-14 RX ORDER — ENOXAPARIN SODIUM 100 MG/ML
40 INJECTION SUBCUTANEOUS EVERY 24 HOURS
Qty: 11.2 ML | Refills: 0 | Status: SHIPPED | OUTPATIENT
Start: 2023-06-14 | End: 2023-07-12

## 2023-06-14 RX ORDER — OXYCODONE HYDROCHLORIDE 5 MG/1
5 TABLET ORAL EVERY 4 HOURS PRN
Qty: 12 TABLET | Refills: 0 | Status: CANCELLED | OUTPATIENT
Start: 2023-06-14

## 2023-06-14 RX ORDER — AMOXICILLIN 250 MG
1-2 CAPSULE ORAL 2 TIMES DAILY PRN
Qty: 60 TABLET | Refills: 0 | Status: SHIPPED | OUTPATIENT
Start: 2023-06-14 | End: 2023-08-08

## 2023-06-14 RX ORDER — IBUPROFEN 600 MG/1
600 TABLET, FILM COATED ORAL EVERY 6 HOURS PRN
Qty: 20 TABLET | Refills: 0 | Status: SHIPPED | OUTPATIENT
Start: 2023-06-14 | End: 2023-06-20

## 2023-06-14 RX ORDER — AMLODIPINE BESYLATE 5 MG/1
5 TABLET ORAL ONCE
Status: COMPLETED | OUTPATIENT
Start: 2023-06-14 | End: 2023-06-14

## 2023-06-14 RX ORDER — AMLODIPINE BESYLATE 5 MG/1
5 TABLET ORAL DAILY
Qty: 30 TABLET | Refills: 0 | Status: SHIPPED | OUTPATIENT
Start: 2023-06-15 | End: 2023-06-14

## 2023-06-14 RX ORDER — IBUPROFEN 600 MG/1
600 TABLET, FILM COATED ORAL EVERY 6 HOURS
Qty: 60 TABLET | Refills: 0 | Status: SHIPPED | OUTPATIENT
Start: 2023-06-14 | End: 2023-06-14

## 2023-06-14 RX ORDER — AMLODIPINE BESYLATE 10 MG/1
10 TABLET ORAL DAILY
Status: DISCONTINUED | OUTPATIENT
Start: 2023-06-15 | End: 2023-06-14 | Stop reason: HOSPADM

## 2023-06-14 RX ADMIN — AMLODIPINE BESYLATE 5 MG: 5 TABLET ORAL at 07:21

## 2023-06-14 RX ADMIN — AMLODIPINE BESYLATE 5 MG: 5 TABLET ORAL at 11:29

## 2023-06-14 RX ADMIN — IBUPROFEN 600 MG: 600 TABLET, FILM COATED ORAL at 05:52

## 2023-06-14 RX ADMIN — IBUPROFEN 600 MG: 600 TABLET, FILM COATED ORAL at 01:20

## 2023-06-14 RX ADMIN — ACETAMINOPHEN 650 MG: 325 TABLET, FILM COATED ORAL at 08:05

## 2023-06-14 RX ADMIN — DOCUSATE SODIUM 50 MG AND SENNOSIDES 8.6 MG 2 TABLET: 8.6; 5 TABLET, FILM COATED ORAL at 01:19

## 2023-06-14 RX ADMIN — ENOXAPARIN SODIUM 40 MG: 40 INJECTION SUBCUTANEOUS at 11:36

## 2023-06-14 RX ADMIN — ACETAMINOPHEN 650 MG: 325 TABLET, FILM COATED ORAL at 01:20

## 2023-06-14 ASSESSMENT — ACTIVITIES OF DAILY LIVING (ADL)
ADLS_ACUITY_SCORE: 24
ADLS_ACUITY_SCORE: 27
ADLS_ACUITY_SCORE: 24
ADLS_ACUITY_SCORE: 27
ADLS_ACUITY_SCORE: 24

## 2023-06-14 NOTE — PLAN OF CARE
Goal Outcome Evaluation:     4x A/O. Passing gas, voiding spontaneously adequate amounts, no BM, no pain but abdominal discomfort, PRN med given. SBA, ambulated in hallway and in room.  High BP in 180s, gave PRN med, BP lowered. Continue to monitor high BP.

## 2023-06-14 NOTE — PLAN OF CARE
Goal Outcome Evaluation:  A&Ox4. HTN and Tachycardic - provider aware. RA. SBA. Reg diet - tolerating well. Denies nausea. Pain controlled w/ tylenol. passing gas. BM x1.  Voiding spontaneously. midline incision - MIGUEL. BG ACHS. IV removed. Lovenox education given. Discharge paper work printed out and reviewed. Forms signed. Pt discharged home w/ roommate.

## 2023-06-14 NOTE — PROVIDER NOTIFICATION
0818  Pts /61, , O2 96 on RA, RR 18. Pt given amlodipine at 0720.  Rates pain 2/10. resting comfortably. any new interventions?   Provider notified     1028   BP retake done at 1020 - /63, HR 95.  Provider paged

## 2023-06-14 NOTE — PROGRESS NOTES
Care Management Discharge Note    Discharge Date: 06/14/2023       Discharge Disposition:  Home    Discharge Services:  Psychosocial resources     Discharge DME: N/A    Discharge Transportation:  Friend will provide    Private pay costs discussed: Not applicable    Does the patient's insurance plan have a 3 day qualifying hospital stay waiver?  No    PAS Confirmation Code:  N/A  Patient/family educated on Medicare website which has current facility and service quality ratings:  N/A    Education Provided on the Discharge Plan:    Persons Notified of Discharge Plans: Yes  Patient/Family in Agreement with the Plan: Yes      Handoff Referral Completed: Yes    Additional Information:  Followed-up with Financial Counselor (FC) on [place] insurance application status for support with insurance need. Reported is that FC supported pt and completed application for [place] insurance yesterday pending REANNA to complete the application process.     CM printed out [employer] REANNA for pt to sign her name and document sent back to FC.     Pt's friend is supporting with discharge transportation. No further concern at this time.     Addendum:  CM was consulted by GYN/ONC due to pt's inability to pay for discharge medication.     Called discharge pharmacy and inquire about medication support programs. discharge pharmacy agreed to discharge pt's  medications and charged it to her IP account. That pt's medication should be ready for pick-up in 20min. Bedside RN notified.     Pt also reported transportation challenges to follow-up with outpatient appointments and rely on her friend for food. CM provided pt with a $40 metro transit bus card to support with transportation need and a $25 [employer] gift card to support with food insecurity.       Bobbi Villatoro, 7C [facility] [place]  P: [phone]  Pager: [phone]  F: [phone]  Weekend Pager: [phone]  Weekend Coverage: 7C; 7D; 5C

## 2023-06-14 NOTE — PROGRESS NOTES
"Blood pressure (!) 185/80, pulse 101, temperature (!) 96.6  F (35.9  C), temperature source Temporal, resp. rate 20, height 1.48 m (4' 10.27\"), weight 79 kg (174 lb 3.2 oz), SpO2 96 %.    A&O x4, Pt hypertensive in 180s this AM. Given PRN Amlodipine @ 0700. Tachycardic in 100s. Denies headache/lightheadedness or cardiac chest pain. Pt dyspneic w/ exertion. Satting >93% on RA. LS clear/diminished. IS encouraged. Abdominal midline incision w/ primapore removed this AM. Pt c/o of abdominal discomfort. Scheduled tylenol + ibuprofen given. PRN senna overnight (2). LBM 6/14. Continue POC   "

## 2023-06-14 NOTE — PLAN OF CARE
Goal Outcome Evaluation:  Goal Outcome Evaluation:  A&Ox4. HTN and Tachycardic - provider aware. RA. SBA. Reg diet - tolerating well. Denies nausea. Pain controlled w/ tylenol. passing gas. BM x1.  Voiding spontaneously. midline incision - MIGUEL. BG ACHS. IV removed. Lovenox education given. Discharge paper work printed out and reviewed. Forms signed. Pt discharged home w/ roommate.

## 2023-06-14 NOTE — PROGRESS NOTES
"Gynecology Oncology Progress Note  June 14, 2023    Ms. Kylah Ojeda is a 62 year old HD# 5/POD# 2 s/p JAVIER, BSO, pelvic and para-aortic node dissection.     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 Ambulating without dizziness but would like to have staff around her to feel safe. Tolerating PO without nausea or vomiting. Passing flatus, and having bowel movement, although she feels slightly constipated. Voiding spontaneously after removal of christiansen. Denies fevers, chills, chest pain, and SOB, although she does feel winded at times with getting up and moving around. Would like to wash her hair today if possible. No other questions or concerns.    Objective:   BP (!) 159/57 (BP Location: Right arm)   Pulse 105   Temp 98  F (36.7  C) (Oral)   Resp 20   Ht 1.48 m (4' 10.27\")   Wt 79 kg (174 lb 3.2 oz)   LMP  (LMP Unknown)   SpO2 96%   BMI 36.07 kg/m      General:  in NAD  CV: RRR  Resp: CTAB, no increased work of breathing  Abdomen: Soft, non-tender, non-distended  Incision: Midline abdominal incision d/i with small amount of dried blood on steri strips   Extremities: nontender, no edema    I/Os  (Yesterday // Since Midnight)  IV: 625 mL // -- mL  PO: 120 mL // --mL  Urine 3950 mL // 1000 mL  Stool: x1    New labs/imaging-    CBC RESULTS: Recent Labs   Lab Test 06/14/23  0614   WBC 9.9   RBC 2.91*   HGB 8.3*   HCT 26.7*   MCV 92   MCH 28.5   MCHC 31.1*   RDW 17.9*              Assessment: Ms. Kylah Ojeda is a 62 year old HD# 5 POD# 2 s/p JAVIER, BSO, pelvic and para-aortic node dissection. Doing well postoperatively and meeting post-op goals. Will continue to observe patient over the next 24h to ensure that she is recovering appropriately from her procedure; likely discharge home tomorrow. 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 Having bowel movements   : Spontaneously voiding s/p christiansen    PPx: SCDs, IS, Lovenox ppx     #HTN   - Started on amlodipine 5 mg every day yesterday. Continues to be slightly hypertensive into the 150's/50's. Suspect that patient being anxious is also contributing to her elevated blood pressures. Will continue to monitor closely for now.   - Continue amlodipine 5 mg every day      #Diabetes   No PTA meds; managed with diet. Last A1c 5.7 from 6/10. BG post surgery ranging from 110's-190's in the setting of receiving 8 mg Decadron intra-op. BG now improved 110's-140's without insulin.    - QID BG checks with [facility]     Dell Hernandez   Gynecologic Oncology, MS4  06/14/23 4:45 AM     I saw and evaluated the patient. I agree with the findings and the plan of care as documented in Dell Hernandez's note.     Good bowel function this morning. BP still elevated. Likely home today if BPs in a safe range.     Karena Dunlap MD  Gynecologic Oncology  Pager [phone]

## 2023-06-15 ENCOUNTER — PATIENT OUTREACH (OUTPATIENT)
Dept: CARE COORDINATION | Facility: CLINIC | Age: 63
End: 2023-06-15
Payer: MEDICAID

## 2023-06-15 NOTE — PROGRESS NOTES
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  Patient reports she had initially thought she was going through menopause with 11 to 12 months with no periods and subsequently had an increase in heavy vaginal bleeding that has worsened over the past year.  States she started having symptoms of dizziness and lightheadedness approximately 8 hours ago that came along with heavy vaginal bleeding.  Has not noted any other bleeding.  Is not on any blood thinning medications.  Reports she has not seen a doctor for the symptoms because she lost her job and did not have insurance.  Denies any fevers, chills or other infectious symptoms.  No nausea or vomiting.  Denies abdominal pain aside from cramping.    ASSESSMENT           Discharge Assessment  How are you doing now that you are home?: Patient states she is feeling very good.  Denies any lightheadedness or dizziness.  Denies any new or worsening symptoms.  Patient states she hasn't taken any iron since getting home.  How are your symptoms? (Red  Flag symptoms escalate to triage hotline per guidelines): Unchanged;Improved  Do you feel your condition is stable enough to be safe at home until your provider visit?: Yes  Does the patient have their discharge instructions? : Yes  Does the patient have questions regarding their discharge instructions? : No  Were you started on any new medications or were there changes to any of your previous medications? : Yes  Does the patient have all of their medications?: Yes  Do you have questions regarding any of your medications? : No  Do you have all of your needed medical supplies or equipment (DME)?  (i.e. oxygen tank, CPAP, cane, etc.): No - What equipment or supplies are needed? (Blood pressure cuff, is ordering one online after reviewing them.)  Discharge follow-up appointment scheduled within 14 calendar days? : Yes  Discharge Follow Up Appointment Date: 06/20/23  Discharge Follow Up Appointment Scheduled with?: Specialty Care Provider (Oncology)         Post-op (Clinicians Only)  Did the patient have surgery or a procedure: Yes  Incision: closed  Drainage: No  Bleeding: none  Fever: No  Chills: No  Redness: No  Warmth: No  Swelling: No  Incision site pain:  (Tender with movement (getting out of bed). Denies any worsening pain.)  Eating & Drinking: eating and drinking without complaints/concerns  PO Intake: regular diet  Additional Symptoms: decreased appetite (patient states slightly decreased appetite.)  Bowel Function:  (Passing gas)  Urinary Status: voiding without complaint/concerns    Patient was open to scheduling with a primary provider but declined care coordination at this time.      PLAN                                                      Outpatient Plan:  Monitor and record you blood pressure daily.  Follow up with Lissett Leonardo CNP next week for a hospital follow up and BP check. Clinic RN care coordinator working on  scheduling. Please call [phone] prior to then for questions or concerns.  Follow  up with primary care provider within two weeks for management of your high blood pressures and diabetes.  Follow up with Dr. Dunlap in the clinic at [facility] in [place] for post-operative follow-up and further care  planning. This appointment has not yet been scheduled.    Future Appointments   Date Time Provider Department Center   6/20/2023 11:00 AM Lissett Leonardo APRN CNP [facility]   7/12/2023  1:30 PM Karena Dunlap MD [facility]         For any urgent concerns, please contact our 24 hour nurse triage line: [phone] ([phone])         Africa Norton RN

## 2023-06-16 LAB
INTERPRETATION: NORMAL
SIGNIFICANT RESULTS: NORMAL
SPECIMEN DESCRIPTION: NORMAL
TEST DETAILS, MDL: NORMAL

## 2023-06-16 PROCEDURE — 81351 TP53 GENE FULL GENE SEQUENCE: CPT | Performed by: OBSTETRICS & GYNECOLOGY

## 2023-06-16 PROCEDURE — 81403 MOPATH PROCEDURE LEVEL 4: CPT | Performed by: OBSTETRICS & GYNECOLOGY

## 2023-06-16 PROCEDURE — G0452 MOLECULAR PATHOLOGY INTERPR: HCPCS | Mod: 26 | Performed by: STUDENT IN AN ORGANIZED HEALTH CARE EDUCATION/TRAINING PROGRAM

## 2023-06-16 NOTE — PROGRESS NOTES
Follow Up Notes on Referred Patient    Date: 2023       RE: Kylah Ojeda  : 1960  JERRY: 2023          Kylah Ojeda is a 62 year old woman with a diagnosis of endometrioid endometrial carcinoma. She is here today for follow up.     Relevant history:     6/10/2023: Kylah Ojeda is a 62 year old  who presents to the ED with heavy vaginal bleeding.      The patient reports a long standing history of heavy vaginal bleeding that has gotten worse in the last several months. She states that ~3 years ago she thought she was going through menopause but states that after ~10 months of no vaginal bleeding, started bleeding again. She since has had episodes of bleeding ever 1-2 months, with heavy clots. This amount of bleeding prevented her from keeping a stable job. Today her bleeding became even heavier than usual. She reports saturating a pad ever 1/2 hour for the past 8 hours. She notes that just prior to arrival she started feeling dizzy and lightheaded. Now, after receiving fluids and blood products she feels much improved. She denies any fevers, chills, nausea, vomiting, dysuria, hematuria, abdominal cramping or pain. No weight loss, night sweats, early satiety. Of note, patient reports a diagnosis previously of hypertension and diabetes but states she has not received care in many years secondary to issues with insurance. She has not received cervical cancer screening in ~20 years.      No history of hormone replacement. No history of tobacco use. No history of DVTs. No family history of gynecologic cancers.      ObGyn Hx:    s/p TAB at age 26.  Last pap smear in her 20s around her AB. No screening since then  Menses: as above   Contraception: None, not sexually active (has not been for 20 years)     6/: [facility] hospital admission for vaginal bleeding and uterine mass hgb 5.8 in ER. 3 units of PRBC given total during hospital stay. Ultrasound significant for large  uterus measuring 19.2x7.8x11.6 w/ large heterogenous hyperechoic lesions with scattered areas of vascular flow highly concerning for malignancy.  6/10/2023 Procedures:   1.  Exploratory laparotomy.  2.  Total abdominal hysterectomy with bilateral salpingo-oophorectomy.  3.  Bilateral pelvic and paraaortic lymph node dissection.  Final Diagnosis  A. Right ovary and fallopian tube, salpingo-oophorectomy:  - Ovary with stromal hyperthecosis   - Fallopian tube with no significant histologic abnormality  - Negative for atypia or malignancy     B. Left ovary and fallopian tube, salpingo-oophorectomy:  - Ovary with stromal hyperthecosis   - Fallopian tube with no significant histologic abnormality  - Negative for atypia or malignancy     C. Uterus and cervix, total abdominal hysterectomy:  - Endometrial endometrioid adenocarcinoma, FIGO grade 2, non-myoinvasive, see comment  - Adenomyosis involved by adenocarcinoma  - Background of benign endometrial polyps  - Cervix with no significant histologic abnormality  D. Residual cervix, resection:  - Cervical tissue with no significant histologic abnormality  E. Lymph nodes, left pelvic, dissection:  - Five reactive lymph nodes examined, negative for malignancy (0/5)  F. Lymph nodes, left para-aortic, dissection:  - Three reactive lymph nodes examined, negative for malignancy (0/3)  G. Lymph nodes, right pelvic, dissection:  - Seven reactive lymph nodes examined, negative for malignancy (0/7)   H. Lymph nodes, right para-aortic, dissection:  - Three reactive lymph nodes examined, negative for malignancy (0/3)        Today Kylah comes to the clinic well. She is anaking Amlodipine 10 mg at 6 am every day. Does have a BP machine at home but needs batteries. No HA or blurred vision.     Healing well postop. No vaginal discharge and abdominal incision intact without drainage. Rare abdominal pain managed with Motrin. She continues Lovenox and lifting precautions. Continues abdominal  binder.     She denies any vaginal bleeding, no changes in her bowel or bladder habits, no nausea/emesis, no lower extremity edema, and no difficulties eating or sleeping. She denies any abdominal discomfort/bloating, no fevers or chills, and no chest pain or shortness of breath.            Review of Systems:    Systemic           no weight changes; no fever; no chills; no night sweats; no appetite changes  Skin           no rashes, or lesions  Eye           no irritation; no changes in vision  Shirley-Laryngeal           no dysphagia; no hoarseness   Pulmonary    no cough; no shortness of breath  Cardiovascular    no chest pain; no palpitations  Gastrointestinal    no diarrhea; no constipation; no abdominal pain; no changes in bowel  habits; no blood in stool  Genitourinary   no urinary frequency; no urinary urgency; no dysuria; no pain; no abnormal vaginal discharge; no abnormal vaginal bleeding  Breast   no breast discharge; no breast changes; no breast pain  Musculoskeletal    no myalgias; no arthralgias; no back pain  Psychiatric           no depressed mood; no anxiety    Hematologic           no tender lymph nodes; no noticeable swellings or lumps   Endocrine    no hot flashes; no heat/cold intolerance         Neurological   no tremor; no numbness and tingling; no headaches; no difficulty  sleeping      Past Medical History:    Past Medical History:   Diagnosis Date     Diabetes mellitus (H)      HTN (hypertension)      Malignant neoplasm of uterus (H)     s/p JAVIER, BSO, staging, 6/12/2023         Past Surgical History:    Past Surgical History:   Procedure Laterality Date     HYSTERECTOMY TOTAL ABDOMINAL, BILATERAL SALPINGO-OOPHORECTOMY, NODE DISSECTION, COMBINED Bilateral 6/12/2023    Procedure: Hysterectomy total abdominal, bilateral salpingo-oophorectomy, pelvic and para-aortic node dissection, combined;  Surgeon: Karena Dunlap MD;  Location:  OR         Health Maintenance Due   Topic Date Due     YEARLY  PREVENTIVE VISIT  Never done     ADVANCE CARE PLANNING  Never done     MAMMO SCREENING  Never done     COLORECTAL CANCER SCREENING  Never done     HIV SCREENING  Never done     HEPATITIS C SCREENING  Never done     PAP  Never done     LIPID  Never done     ZOSTER IMMUNIZATION (1 of 2) Never done     COVID-19 Vaccine (3 - Pfizer series) 07/22/2021     PHQ-2 (once per calendar year)  Never done       Current Medications:     Current Outpatient Medications   Medication Sig Dispense Refill     acetaminophen (TYLENOL) 325 MG tablet Take 2 tablets (650 mg) by mouth every 6 hours as needed for mild pain 40 tablet 0     amLODIPine (NORVASC) 10 MG tablet Take 1 tablet (10 mg) by mouth daily 30 tablet 0     enoxaparin ANTICOAGULANT (LOVENOX) 40 MG/0.4ML syringe Inject 0.4 mLs (40 mg) Subcutaneous every 24 hours for 28 doses 11.2 mL 0     ferrous sulfate (FEROSUL) 325 (65 Fe) MG tablet Take 325 mg by mouth daily (with breakfast)       ibuprofen (ADVIL/MOTRIN) 600 MG tablet Take 1 tablet (600 mg) by mouth every 6 hours as needed for inflammatory pain 20 tablet 0     Multiple Vitamins-Minerals (ONE-A-DAY 50 PLUS PO) Take 1 tablet by mouth daily       senna-docusate (SENOKOT-S/PERICOLACE) 8.6-50 MG tablet Take 1-2 tablets by mouth 2 times daily as needed for constipation 60 tablet 0     oxyCODONE (ROXICODONE) 5 MG tablet Take 1 tablet (5 mg) by mouth every 6 hours as needed for severe pain (Patient not taking: Reported on 6/20/2023) 12 tablet 0         Allergies:        Allergies   Allergen Reactions     Codeine Dizziness        Social History:     Social History     Tobacco Use     Smoking status: Not on file     Smokeless tobacco: Not on file   Vaping Use     Vaping status: Not on file   Substance Use Topics     Alcohol use: Not on file       History   Drug Use Not on file         Family History:     The patient's family history is notable for .    No family history on file.      Physical Exam:     BP (!) 155/81   Pulse 106    "Resp 16   Ht 1.473 m (4' 10\")   Wt 75.9 kg (167 lb 6.4 oz)   LMP  (LMP Unknown)   SpO2 99%   BMI 34.99 kg/m    Body mass index is 34.99 kg/m .    General Appearance: healthy and alert, no distress     HEENT:  no thyromegaly, no palpable nodules or masses        Cardiovascular: regular rate and rhythm, no gallops, rubs or murmurs     Respiratory: lungs clear, no rales, rhonchi or wheezes    Musculoskeletal: extremities non tender and without edema    Skin: no lesions or rashes     Neurological: normal gait, no gross defects     Psychiatric: appropriate mood and affect                               Hematological: normal cervical, supraclavicular and inguinal lymph nodes     Gastrointestinal:       abdomen soft, non-tender, non-distended, no organomegaly or masses; midline incision C/D/I steri strips in place no induration or separation/drainage     Genitourinary: Deferred      Assessment:    Kylah Ojeda is a 62 year old woman with a diagnosis of endometrioid endometrial carcinoma. She is here today for follow up.    25 minutes spent on the date of the encounter doing chart review, history and exam, documentation, and further activities as noted above.          Plan:     1.)        Endometrial cancer: reviewed pathology with patient today and encouraged continued discussion for full diagnosis and staging with Dr. Dunlap. Reviewed pre-op CT and path. Pt healing well. Encouraged continued lovenox and precautions. Reviewed weaning off abdominal binder over the next couple weeks. Reviewed signs and symptoms for when she should contact the clinic or seek additional care. Patient to contact the clinic with any questions or concerns in the interim. Pt has postop MD follow up on 7/12.      2.) Genetics: MMR intact. No Fam hx of cancer.     3.) Labs and/or tests ordered include:  none     4.) Health maintenance issues addressed today include annual health maintenance and non-gynecologic issues with PCP.    5.)       " HTN: BP much improved today. Encouraged pt to start a daily BP log leading up to PCP appt on 7/5. Reviewed that she might need medications adjusted at that visit as the goal BP is <140/80.             IRENE Albright, NP-BC  Women's Health Nurse Practitioner  Division of Gynecologic Oncology  [facility]      CC  Patient Care Team:  No Ref-Primary, Physician as PCP - General

## 2023-06-19 ENCOUNTER — PATIENT OUTREACH (OUTPATIENT)
Dept: CARE COORDINATION | Facility: CLINIC | Age: 63
End: 2023-06-19
Payer: MEDICAID

## 2023-06-19 ENCOUNTER — TELEPHONE (OUTPATIENT)
Dept: SURGERY | Facility: CLINIC | Age: 63
End: 2023-06-19
Payer: MEDICAID

## 2023-06-19 NOTE — PROGRESS NOTES
"Social Work Progress Note    Patient Name:  Kylah Ojeda  /Age:  1960 (62 year old)    Reason for Follow-Up:  Kylah is a 62-year-old woman with a new diagnosis of endometrial endometrioid adenocarcinoma who is followed by Dr. Dunlap at [facility]. This clinician received referral from RNCC Inge who reported that she received hand-off from inpatient team about desire for ongoing social work support.     Intervention:   This clinician spoke with Kylah today by phone ([phone]) who reports that she is feeling \"stronger each day\" and reports that she is antsy to start doing more for herself. Kylah reports that she is presently unemployed with no savings, but that she received $40 bus pass from  at the hospital that she plans to use for transportation to tomorrows appointment. Kylah reported concern about health insurance, as she received a bill for $750. SW reviewed the following with Kylah:     Insurance: Kylah completed application for MA with help of inpatient financial counselor JOHNY Mandel, who will f/u with [facility] 23    SW discussed that financial counselor likely has already applied for retroactive coverage, but that this clinician would outreach today to verify. SW encouraged outreach to billing department to let them know that you are awaiting notification about MA approval.     yKlah receptive to meeting with this clinician in-person after visit with Lissett Leonardo to discuss additional resource support.      Plan:  This clinician will be available in-person tomorrow for introduction to social work services and support after her scheduled visit with Lissett Leonardo.     Please call or page if needs or concerns arise.     Nicki Stringer, GAGANDEEP, LICSW, OSW-C  Clinical - Adult Oncology  She/Her/Hers  Phone: [phone]  [facility]: M, Thu  *every other Tue, 8am-4:30pm  [facility] W, F, *every other Tue, 8am-4:30pm       "

## 2023-06-20 ENCOUNTER — PATIENT OUTREACH (OUTPATIENT)
Dept: CARE COORDINATION | Facility: CLINIC | Age: 63
End: 2023-06-20

## 2023-06-20 ENCOUNTER — ONCOLOGY VISIT (OUTPATIENT)
Dept: ONCOLOGY | Facility: CLINIC | Age: 63
End: 2023-06-20
Attending: OBSTETRICS & GYNECOLOGY
Payer: MEDICAID

## 2023-06-20 VITALS
SYSTOLIC BLOOD PRESSURE: 155 MMHG | OXYGEN SATURATION: 99 % | DIASTOLIC BLOOD PRESSURE: 81 MMHG | WEIGHT: 167.4 LBS | HEART RATE: 106 BPM | BODY MASS INDEX: 35.14 KG/M2 | HEIGHT: 58 IN | RESPIRATION RATE: 16 BRPM

## 2023-06-20 DIAGNOSIS — G89.18 ACUTE POST-OPERATIVE PAIN: ICD-10-CM

## 2023-06-20 DIAGNOSIS — Z90.710 S/P ABDOMINAL HYSTERECTOMY: ICD-10-CM

## 2023-06-20 PROCEDURE — G0463 HOSPITAL OUTPT CLINIC VISIT: HCPCS | Performed by: OBSTETRICS & GYNECOLOGY

## 2023-06-20 RX ORDER — ACETAMINOPHEN 325 MG/1
650 TABLET ORAL EVERY 6 HOURS PRN
Qty: 40 TABLET | Refills: 0 | Status: SHIPPED | OUTPATIENT
Start: 2023-06-20 | End: 2024-01-08

## 2023-06-20 RX ORDER — IBUPROFEN 600 MG/1
600 TABLET, FILM COATED ORAL EVERY 6 HOURS PRN
Qty: 20 TABLET | Refills: 0 | Status: SHIPPED | OUTPATIENT
Start: 2023-06-20 | End: 2023-07-05

## 2023-06-20 ASSESSMENT — PAIN SCALES - GENERAL: PAINLEVEL: NO PAIN (0)

## 2023-06-20 NOTE — PROGRESS NOTES
"Oncology Rooming Note    June 20, 2023 10:56 AM   Kylah Ojeda is a 62 year old female who presents for:    Chief Complaint   Patient presents with     Oncology Clinic Visit     Initial Vitals: BP (!) 155/81   Pulse 106   Resp 16   Ht 1.473 m (4' 10\")   Wt 75.9 kg (167 lb 6.4 oz)   LMP  (LMP Unknown)   SpO2 99%   BMI 34.99 kg/m   Estimated body mass index is 34.99 kg/m  as calculated from the following:    Height as of this encounter: 1.473 m (4' 10\").    Weight as of this encounter: 75.9 kg (167 lb 6.4 oz). Body surface area is 1.76 meters squared.  No Pain (0) Comment: Data Unavailable   No LMP recorded (lmp unknown). Patient is premenopausal.  Allergies reviewed: Yes  Medications reviewed: Yes    Medications: Medication refills not needed today.  Pharmacy name entered into [facility]: [facility] - [place], [place] - [address]    Clinical concerns: Refill needed for Ibuprofen and Acetaminophen.       Cris Guzman MA            "

## 2023-06-20 NOTE — LETTER
2023         RE: Kylah Ojeda  [address]  [facility] [address]        Dear Colleague,    Thank you for referring your patient, Kylah Ojeda, to the [facility]. Please see a copy of my visit note below.                    Follow Up Notes on Referred Patient    Date: 2023       RE: Kylah Ojeda  : 1960  JERRY: 2023          Kylah Ojeda is a 62 year old woman with a diagnosis of endometrioid endometrial carcinoma. She is here today for follow up.     Relevant history:     6/10/2023: Kylah Ojeda is a 62 year old  who presents to the ED with heavy vaginal bleeding.      The patient reports a long standing history of heavy vaginal bleeding that has gotten worse in the last several months. She states that ~3 years ago she thought she was going through menopause but states that after ~10 months of no vaginal bleeding, started bleeding again. She since has had episodes of bleeding ever 1-2 months, with heavy clots. This amount of bleeding prevented her from keeping a stable job. Today her bleeding became even heavier than usual. She reports saturating a pad ever 1/2 hour for the past 8 hours. She notes that just prior to arrival she started feeling dizzy and lightheaded. Now, after receiving fluids and blood products she feels much improved. She denies any fevers, chills, nausea, vomiting, dysuria, hematuria, abdominal cramping or pain. No weight loss, night sweats, early satiety. Of note, patient reports a diagnosis previously of hypertension and diabetes but states she has not received care in many years secondary to issues with insurance. She has not received cervical cancer screening in ~20 years.      No history of hormone replacement. No history of tobacco use. No history of DVTs. No family history of gynecologic cancers.      ObGyn Hx:    s/p TAB at age 26.  Last pap smear in her 20s around her AB. No screening since then  Menses: as above    Contraception: None, not sexually active (has not been for 20 years)     6/10-6/14/2023: [facility] hospital admission for vaginal bleeding and uterine mass hgb 5.8 in ER. 3 units of PRBC given total during hospital stay. Ultrasound significant for large uterus measuring 19.2x7.8x11.6 w/ large heterogenous hyperechoic lesions with scattered areas of vascular flow highly concerning for malignancy.  6/10/2023 Procedures:   1.  Exploratory laparotomy.  2.  Total abdominal hysterectomy with bilateral salpingo-oophorectomy.  3.  Bilateral pelvic and paraaortic lymph node dissection.  Final Diagnosis  A. Right ovary and fallopian tube, salpingo-oophorectomy:  - Ovary with stromal hyperthecosis   - Fallopian tube with no significant histologic abnormality  - Negative for atypia or malignancy     B. Left ovary and fallopian tube, salpingo-oophorectomy:  - Ovary with stromal hyperthecosis   - Fallopian tube with no significant histologic abnormality  - Negative for atypia or malignancy     C. Uterus and cervix, total abdominal hysterectomy:  - Endometrial endometrioid adenocarcinoma, FIGO grade 2, non-myoinvasive, see comment  - Adenomyosis involved by adenocarcinoma  - Background of benign endometrial polyps  - Cervix with no significant histologic abnormality  D. Residual cervix, resection:  - Cervical tissue with no significant histologic abnormality  E. Lymph nodes, left pelvic, dissection:  - Five reactive lymph nodes examined, negative for malignancy (0/5)  F. Lymph nodes, left para-aortic, dissection:  - Three reactive lymph nodes examined, negative for malignancy (0/3)  G. Lymph nodes, right pelvic, dissection:  - Seven reactive lymph nodes examined, negative for malignancy (0/7)   H. Lymph nodes, right para-aortic, dissection:  - Three reactive lymph nodes examined, negative for malignancy (0/3)        Today Kylah comes to the clinic well. She is anaking Amlodipine 10 mg at 6 am every day. Does have a BP machine at home  but needs batteries. No HA or blurred vision.     Healing well postop. No vaginal discharge and abdominal incision intact without drainage. Rare abdominal pain managed with Motrin. She continues Lovenox and lifting precautions. Continues abdominal binder.     She denies any vaginal bleeding, no changes in her bowel or bladder habits, no nausea/emesis, no lower extremity edema, and no difficulties eating or sleeping. She denies any abdominal discomfort/bloating, no fevers or chills, and no chest pain or shortness of breath.            Review of Systems:    Systemic           no weight changes; no fever; no chills; no night sweats; no appetite changes  Skin           no rashes, or lesions  Eye           no irritation; no changes in vision  Shirley-Laryngeal           no dysphagia; no hoarseness   Pulmonary    no cough; no shortness of breath  Cardiovascular    no chest pain; no palpitations  Gastrointestinal    no diarrhea; no constipation; no abdominal pain; no changes in bowel  habits; no blood in stool  Genitourinary   no urinary frequency; no urinary urgency; no dysuria; no pain; no abnormal vaginal discharge; no abnormal vaginal bleeding  Breast   no breast discharge; no breast changes; no breast pain  Musculoskeletal    no myalgias; no arthralgias; no back pain  Psychiatric           no depressed mood; no anxiety    Hematologic           no tender lymph nodes; no noticeable swellings or lumps   Endocrine    no hot flashes; no heat/cold intolerance         Neurological   no tremor; no numbness and tingling; no headaches; no difficulty  sleeping      Past Medical History:    Past Medical History:   Diagnosis Date     Diabetes mellitus (H)      HTN (hypertension)      Malignant neoplasm of uterus (H)     s/p JAVIER, BSO, staging, 6/12/2023         Past Surgical History:    Past Surgical History:   Procedure Laterality Date     HYSTERECTOMY TOTAL ABDOMINAL, BILATERAL SALPINGO-OOPHORECTOMY, NODE DISSECTION, COMBINED  Bilateral 6/12/2023    Procedure: Hysterectomy total abdominal, bilateral salpingo-oophorectomy, pelvic and para-aortic node dissection, combined;  Surgeon: Karena Dunlap MD;  Location:  OR         Health Maintenance Due   Topic Date Due     YEARLY PREVENTIVE VISIT  Never done     ADVANCE CARE PLANNING  Never done     MAMMO SCREENING  Never done     COLORECTAL CANCER SCREENING  Never done     HIV SCREENING  Never done     HEPATITIS C SCREENING  Never done     PAP  Never done     LIPID  Never done     ZOSTER IMMUNIZATION (1 of 2) Never done     COVID-19 Vaccine (3 - Pfizer series) 07/22/2021     PHQ-2 (once per calendar year)  Never done       Current Medications:     Current Outpatient Medications   Medication Sig Dispense Refill     acetaminophen (TYLENOL) 325 MG tablet Take 2 tablets (650 mg) by mouth every 6 hours as needed for mild pain 40 tablet 0     amLODIPine (NORVASC) 10 MG tablet Take 1 tablet (10 mg) by mouth daily 30 tablet 0     enoxaparin ANTICOAGULANT (LOVENOX) 40 MG/0.4ML syringe Inject 0.4 mLs (40 mg) Subcutaneous every 24 hours for 28 doses 11.2 mL 0     ferrous sulfate (FEROSUL) 325 (65 Fe) MG tablet Take 325 mg by mouth daily (with breakfast)       ibuprofen (ADVIL/MOTRIN) 600 MG tablet Take 1 tablet (600 mg) by mouth every 6 hours as needed for inflammatory pain 20 tablet 0     Multiple Vitamins-Minerals (ONE-A-DAY 50 PLUS PO) Take 1 tablet by mouth daily       senna-docusate (SENOKOT-S/PERICOLACE) 8.6-50 MG tablet Take 1-2 tablets by mouth 2 times daily as needed for constipation 60 tablet 0     oxyCODONE (ROXICODONE) 5 MG tablet Take 1 tablet (5 mg) by mouth every 6 hours as needed for severe pain (Patient not taking: Reported on 6/20/2023) 12 tablet 0         Allergies:        Allergies   Allergen Reactions     Codeine Dizziness        Social History:     Social History     Tobacco Use     Smoking status: Not on file     Smokeless tobacco: Not on file   Vaping Use     Vaping status:  "Not on file   Substance Use Topics     Alcohol use: Not on file       History   Drug Use Not on file         Family History:     The patient's family history is notable for .    No family history on file.      Physical Exam:     BP (!) 155/81   Pulse 106   Resp 16   Ht 1.473 m (4' 10\")   Wt 75.9 kg (167 lb 6.4 oz)   LMP  (LMP Unknown)   SpO2 99%   BMI 34.99 kg/m    Body mass index is 34.99 kg/m .    General Appearance: healthy and alert, no distress     HEENT:  no thyromegaly, no palpable nodules or masses        Cardiovascular: regular rate and rhythm, no gallops, rubs or murmurs     Respiratory: lungs clear, no rales, rhonchi or wheezes    Musculoskeletal: extremities non tender and without edema    Skin: no lesions or rashes     Neurological: normal gait, no gross defects     Psychiatric: appropriate mood and affect                               Hematological: normal cervical, supraclavicular and inguinal lymph nodes     Gastrointestinal:       abdomen soft, non-tender, non-distended, no organomegaly or masses; midline incision C/D/I steri strips in place no induration or separation/drainage     Genitourinary: Deferred      Assessment:    Kylah Ojeda is a 62 year old woman with a diagnosis of endometrioid endometrial carcinoma. She is here today for follow up.    25 minutes spent on the date of the encounter doing chart review, history and exam, documentation, and further activities as noted above.          Plan:     1.)        Endometrial cancer: reviewed pathology with patient today and encouraged continued discussion for full diagnosis and staging with Dr. Dunlap. Reviewed pre-op CT and path. Pt healing well. Encouraged continued lovenox and precautions. Reviewed weaning off abdominal binder over the next couple weeks. Reviewed signs and symptoms for when she should contact the clinic or seek additional care. Patient to contact the clinic with any questions or concerns in the interim. Pt has postop " "MD follow up on 7/12.      2.) Genetics: MMR intact. No Fam hx of cancer.     3.) Labs and/or tests ordered include:  none     4.) Health maintenance issues addressed today include annual health maintenance and non-gynecologic issues with PCP.    5.)       HTN: BP much improved today. Encouraged pt to start a daily BP log leading up to PCP appt on 7/5. Reviewed that she might need medications adjusted at that visit as the goal BP is <140/80.             IRENE Albright, NP-BC  Women's Health Nurse Practitioner  Division of Gynecologic Oncology  [facility]      CC  Patient Care Team:  No Ref-Primary, Physician as PCP - General      Oncology Rooming Note    June 20, 2023 10:56 AM   Kylah Ojeda is a 62 year old female who presents for:    Chief Complaint   Patient presents with     Oncology Clinic Visit     Initial Vitals: BP (!) 155/81   Pulse 106   Resp 16   Ht 1.473 m (4' 10\")   Wt 75.9 kg (167 lb 6.4 oz)   LMP  (LMP Unknown)   SpO2 99%   BMI 34.99 kg/m   Estimated body mass index is 34.99 kg/m  as calculated from the following:    Height as of this encounter: 1.473 m (4' 10\").    Weight as of this encounter: 75.9 kg (167 lb 6.4 oz). Body surface area is 1.76 meters squared.  No Pain (0) Comment: Data Unavailable   No LMP recorded (lmp unknown). Patient is premenopausal.  Allergies reviewed: Yes  Medications reviewed: Yes    Medications: Medication refills not needed today.  Pharmacy name entered into [facility]: [facility] - [place], [place] - [address] [place] [address]    Clinical concerns: Refill needed for Ibuprofen and Acetaminophen.       Cris Guzman MA                Again, thank you for allowing me to participate in the care of your patient.        Sincerely,        RIENE Chamberlain CNP    "
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
## 2023-06-25 NOTE — ANESTHESIA CARE TRANSFER NOTE
Patient: Kylah Ojeda    Procedure: Procedure(s):  Hysterectomy total abdominal, bilateral salpingo-oophorectomy, pelvic and para-aortic node dissection, combined       Diagnosis: Uterine mass [N85.8]  Diagnosis Additional Information: No value filed.    Anesthesia Type:   General     Note:    Oropharynx: oropharynx clear of all foreign objects and spontaneously breathing  Level of Consciousness: awake and drowsy  Oxygen Supplementation: face mask  Level of Supplemental Oxygen (L/min / FiO2): 6  Independent Airway: airway patency satisfactory and stable  Dentition: dentition unchanged  Vital Signs Stable: post-procedure vital signs reviewed and stable  Report to RN Given: handoff report given  Patient transferred to: PACU    Handoff Report: Identifed the Patient, Identified the Reponsible Provider, Reviewed the pertinent medical history, Discussed the surgical course, Reviewed Intra-OP anesthesia mangement and issues during anesthesia, Set expectations for post-procedure period and Allowed opportunity for questions and acknowledgement of understanding      Vitals:  Vitals Value Taken Time   BP     Temp     Pulse 92 06/12/23 1221   Resp 24 06/12/23 1221   SpO2 99 % 06/12/23 1221   Vitals shown include unvalidated device data.    Electronically Signed By: Liane Kolb MD  June 12, 2023  12:22 PM   PAST MEDICAL HISTORY:  [place] - Breast Cancer     Hypercholesteremia     Hypothyroidism

## 2023-06-28 ENCOUNTER — PATIENT OUTREACH (OUTPATIENT)
Dept: CARE COORDINATION | Facility: CLINIC | Age: 63
End: 2023-06-28
Payer: MEDICAID

## 2023-06-28 NOTE — PROGRESS NOTES
Social Work - Follow-Up  [facility]    Data/Intervention:    Patient Name: Kylah Ojeda Goes By: Kylah    /Age: 1960 (62 year old)    Reason for Follow-Up:  Health insurance coverage concerns    Intervention/Education/Resources Provided:  This clinician received the following email from Kylah:     Dear Nicki,    Perhaps you can help me.  I've received emails asking me to pay my hospital bill.  I know that someone, not sure who, has applied for me to receive full coverage via Medicaid/M[employer], and that papers have been esigned and sent for the purpose.    I have a clinic appointment on .   Jeannine doesn't see me as being insured.  I don't know if I'm expected to pay at the time of the visit, which, of course, I can't.    I have a link for signing up for [employer], but I'm not sure if I should do this as I believe someone else from the [facility] [place]  has done or is in the process of doing this already.    Is there someone I need to contact concerning if or when my Medicaid coverage is activated?  I deeply apologize if this isn't your area and you don't have the answer.    Thank you so much for your time.    Best regards,    Kylah    This clinician collaborated with Ministerio, and then sent Kylah the following:     Eli Montero,   You are correct, our [employer] Counselor, Ministreio Henriquez, applied for Medicaid for you. She is working with the [facility] to get your Medicaid active and has a plan to outreach to them today. I wanted to pass along her phone number as well: [phone]. She will outreach to you for an update.     I am so sorry that you are receiving messaging about your hospital bill. That is so stressful.     Please let me know if I can assist with anything else.   -Nicki     Assessment/Plan:  This clinician will continue to be available as needed for ongoing psychosocial support. yKlah knows to outreach in case of psychosocial concern or need.    Nicki Stringer,  MSW, LICSW, OSW-C  Clinical - Adult Oncology  She/Her/Hers  Phone: [phone]  [facility]: Brianda ONTIVEROS  *every other Tue, 8am-4:30pm  [facility]: W F, *every other Tue, 8am-4:30pm

## 2023-06-29 LAB
PATH REPORT.ADDENDUM SPEC: ABNORMAL
PATH REPORT.ADDENDUM SPEC: ABNORMAL
PATH REPORT.COMMENTS IMP SPEC: ABNORMAL
PATH REPORT.COMMENTS IMP SPEC: YES
PATH REPORT.FINAL DX SPEC: ABNORMAL
PATH REPORT.GROSS SPEC: ABNORMAL
PATH REPORT.INTRAOP OBS SPEC DOC: ABNORMAL
PATH REPORT.MICROSCOPIC SPEC OTHER STN: ABNORMAL
PATH REPORT.RELEVANT HX SPEC: ABNORMAL
PATHOLOGY SYNOPTIC REPORT: ABNORMAL
PHOTO IMAGE: ABNORMAL

## 2023-06-29 PROCEDURE — 88341 IMHCHEM/IMCYTCHM EA ADD ANTB: CPT | Mod: TC | Performed by: OBSTETRICS & GYNECOLOGY

## 2023-06-29 PROCEDURE — 88341 IMHCHEM/IMCYTCHM EA ADD ANTB: CPT | Mod: 26 | Performed by: PATHOLOGY

## 2023-07-05 ENCOUNTER — OFFICE VISIT (OUTPATIENT)
Dept: FAMILY MEDICINE | Facility: CLINIC | Age: 63
End: 2023-07-05
Payer: MEDICAID

## 2023-07-05 VITALS
SYSTOLIC BLOOD PRESSURE: 122 MMHG | BODY MASS INDEX: 35.99 KG/M2 | WEIGHT: 166.8 LBS | HEIGHT: 57 IN | OXYGEN SATURATION: 97 % | DIASTOLIC BLOOD PRESSURE: 68 MMHG | HEART RATE: 89 BPM | RESPIRATION RATE: 18 BRPM | TEMPERATURE: 100 F

## 2023-07-05 DIAGNOSIS — E66.01 CLASS 2 SEVERE OBESITY DUE TO EXCESS CALORIES WITH SERIOUS COMORBIDITY AND BODY MASS INDEX (BMI) OF 36.0 TO 36.9 IN ADULT (H): ICD-10-CM

## 2023-07-05 DIAGNOSIS — I10 ESSENTIAL HYPERTENSION WITH GOAL BLOOD PRESSURE LESS THAN 140/90: ICD-10-CM

## 2023-07-05 DIAGNOSIS — E11.9 TYPE 2 DIABETES MELLITUS WITHOUT COMPLICATION, WITHOUT LONG-TERM CURRENT USE OF INSULIN (H): ICD-10-CM

## 2023-07-05 DIAGNOSIS — Z90.710 S/P TOTAL ABDOMINAL HYSTERECTOMY: ICD-10-CM

## 2023-07-05 DIAGNOSIS — N93.9 UTERINE BLEEDING: ICD-10-CM

## 2023-07-05 DIAGNOSIS — Z09 HOSPITAL DISCHARGE FOLLOW-UP: Primary | ICD-10-CM

## 2023-07-05 DIAGNOSIS — E66.812 CLASS 2 SEVERE OBESITY DUE TO EXCESS CALORIES WITH SERIOUS COMORBIDITY AND BODY MASS INDEX (BMI) OF 36.0 TO 36.9 IN ADULT (H): ICD-10-CM

## 2023-07-05 DIAGNOSIS — R57.1 HYPOVOLEMIC SHOCK (H): ICD-10-CM

## 2023-07-05 DIAGNOSIS — C54.1 ENDOMETRIAL CANCER (H): ICD-10-CM

## 2023-07-05 PROCEDURE — 99204 OFFICE O/P NEW MOD 45 MIN: CPT | Performed by: FAMILY MEDICINE

## 2023-07-05 RX ORDER — AMLODIPINE BESYLATE 10 MG/1
10 TABLET ORAL DAILY
Qty: 90 TABLET | Refills: 1 | Status: SHIPPED | OUTPATIENT
Start: 2023-07-05 | End: 2024-01-08

## 2023-07-05 ASSESSMENT — PAIN SCALES - GENERAL: PAINLEVEL: NO PAIN (0)

## 2023-07-05 NOTE — PATIENT INSTRUCTIONS
Check your blood pressure after sitting and resting for 10 minutes.  Goal blood pressure is less than 140/90.  Check your blood pressure 2-3 times a week.  Please log your blood pressures and bring to your next visit.

## 2023-07-05 NOTE — PROGRESS NOTES
Assessment & Plan     Hospital discharge follow-up  Endometrial cancer (H)  S/P total abdominal hysterectomy  Hypovolemic shock (H)  Uterine bleeding  -Symptoms improved since discharge  -Following with gyn onc for cancer monitoring    Essential hypertension with goal blood pressure less than 140/90  -Well controlled, working on lifestyle changes, would eventually like to be off medication  -Discussed checking BP at home and bring log  - amLODIPine (NORVASC) 10 MG tablet  Dispense: 90 tablet; Refill: 1    Type 2 diabetes mellitus without complication, without long-term current use of insulin (H)  -Diagnosed in 2018, has been well controlled since without medication  -A1c 5.7 in June  -Continue with lifestyle and dietary changes     Class 2 severe obesity due to excess calories with serious comorbidity and body mass index (BMI) of 36.0 to 36.9 in adult (H)  -Patient making lifestyle and dietary changes  -Continue to monitor    Follow-up in 4 weeks for annual preventive health visit.      45 minutes spent by me on the date of the encounter doing chart review, history and exam, documentation and further activities per the note.     MED REC REQUIRED  Post Medication Reconciliation Status: discharge medications reconciled and changed, per note/orders      Malcoml Yu DO  [facility]    Nahed Montero is a 62 year old, presenting for the following health issues:  Hospital F/U (AUB ER 6/10/2023. Emergency hysterectomy done on 6/12/2023, pt. Stated that is feeling excellent and no bleeding. )        7/5/2023    11:02 AM   Additional Questions   Roomed by MELISSA Koch   Accompanied by self         7/5/2023    11:02 AM   Patient Reported Additional Medications   Patient reports taking the following new medications none     HPI     Hospital Follow-up Visit:    Hospital/Nursing Home/IP Rehab Facility: [facility]  Date of Admission: 6/12/2023  Date of  "Discharge: 6/14/2023   Reason(s) for Admission: AUB, emergency hysterectomy    Was your hospitalization related to COVID-19? No   Problems taking medications regularly:  None  Medication changes since discharge: None  Problems adhering to non-medication therapy:  None    Summary of hospitalization:  [facility] discharge summary reviewed  Diagnostic Tests/Treatments reviewed.  Follow up needed: gyn onc  Other Healthcare Providers Involved in Patient s Care:         Specialist appointment - gyn onc  Update since discharge: improved.         Plan of care communicated with patient                   Review of Systems         Objective    /68 (BP Location: Right arm, Patient Position: Sitting, Cuff Size: Adult Regular)   Pulse 89   Temp 100  F (37.8  C) (Temporal)   Resp 18   Ht 1.448 m (4' 9\")   Wt 75.7 kg (166 lb 12.8 oz)   LMP  (LMP Unknown)   SpO2 97%   BMI 36.10 kg/m    Body mass index is 36.1 kg/m .  Physical Exam   GENERAL: healthy, alert and no distress  RESP: lungs clear to auscultation - no rales, rhonchi or wheezes  CV: regular rate and rhythm, normal S1 S2, no S3 or S4, no murmur, click or rub, no peripheral edema and peripheral pulses strong  NEURO: Normal strength and tone, mentation intact and speech normal  PSYCH: mentation appears normal, affect normal/bright                    "

## 2023-07-09 ENCOUNTER — HEALTH MAINTENANCE LETTER (OUTPATIENT)
Age: 63
End: 2023-07-09

## 2023-07-12 ENCOUNTER — ONCOLOGY VISIT (OUTPATIENT)
Dept: ONCOLOGY | Facility: CLINIC | Age: 63
End: 2023-07-12
Payer: MEDICAID

## 2023-07-12 VITALS
OXYGEN SATURATION: 98 % | WEIGHT: 167.8 LBS | BODY MASS INDEX: 36.31 KG/M2 | RESPIRATION RATE: 20 BRPM | SYSTOLIC BLOOD PRESSURE: 148 MMHG | DIASTOLIC BLOOD PRESSURE: 85 MMHG | TEMPERATURE: 98 F | HEART RATE: 104 BPM

## 2023-07-12 DIAGNOSIS — C54.1 ENDOMETRIAL CANCER (H): Primary | ICD-10-CM

## 2023-07-12 PROCEDURE — G0463 HOSPITAL OUTPT CLINIC VISIT: HCPCS | Performed by: OBSTETRICS & GYNECOLOGY

## 2023-07-12 PROCEDURE — 99213 OFFICE O/P EST LOW 20 MIN: CPT | Mod: 24 | Performed by: OBSTETRICS & GYNECOLOGY

## 2023-07-12 ASSESSMENT — PAIN SCALES - GENERAL: PAINLEVEL: NO PAIN (0)

## 2023-07-12 NOTE — PROGRESS NOTES
Gynecologic Oncology     Referring provider:    No referring provider defined for this encounter.   RE: Kylah Ojeda  : 1960  JERRY: 2023      CC: Stage 1A, Grade 1 endometrial cancer, MMR proficient    HPI: Ms Kylah Ojeda is a 62 year old year old female who presents for consultation. She is here today alone.     Treatment History:  6/10/2023-2023: Admission to [facility] for hemorrhagic shock and uterine mass. Had XLap/JAVIER/BSO/PPLND. Final pathology stage 1A grade 2 endometrial adenocarcinoma. NSMP. MMR proficient. Non myoinvasive. No LVSI.       Since surgery she feels really well. Minimal Pain. Energy improving. Normal GI/ function. Has scheduled a PCP appt for end . Wants to start an exercise program. No bleeding        Past Medical History:   Diagnosis Date     Diabetes mellitus (H)      HTN (hypertension)      Malignant neoplasm of uterus (H)     s/p JAVIER, BSO, staging, 2023       Past Surgical History:   Procedure Laterality Date     HYSTERECTOMY TOTAL ABDOMINAL, BILATERAL SALPINGO-OOPHORECTOMY, NODE DISSECTION, COMBINED Bilateral 2023    Procedure: Hysterectomy total abdominal, bilateral salpingo-oophorectomy, pelvic and para-aortic node dissection, combined;  Surgeon: Karena Dunlap MD;  Location:  OR        [facility] history and Health Maintenance (Personally reviewed 2023):    Last Pap Smear: never  Last Mammogram:never  Last Colonoscopy: never     Medications and allergies reviewed in EPIC    Social History:  Social History     Tobacco Use     Smoking status: Never     Smokeless tobacco: Never   Substance Use Topics     Alcohol use: Not on file     Work: unemployed  Ethnicity identification: .  American and whtie   Preferred language: English  Lives at home with: Friend    Family History:   The patient's family history is notable for no malignancies in first degree relatives. .    Physical Exam:     BP (!) 148/85 (BP Location: Left arm,  Patient Position: Sitting, Cuff Size: Adult Large)   Pulse 104   Temp 98  F (36.7  C) (Oral)   Resp 20   Wt 76.1 kg (167 lb 12.8 oz)   LMP  (LMP Unknown)   SpO2 98%   BMI 36.31 kg/m    Body mass index is 36.31 kg/m .    General: Alert and oriented, no acute distress  Psych: Mood stable. Linear speech, appropriate affect  GI: No distention. No masses. No hernia. Incision healing well  Lymph: No enlarge lymph nodes in neck or groin  : Normal external genitalia. Cuff in tact.       Final Diagnosis   A. Right ovary and fallopian tube, salpingo-oophorectomy:  - Ovary with stromal hyperthecosis   - Fallopian tube with no significant histologic abnormality  - Negative for atypia or malignancy     B. Left ovary and fallopian tube, salpingo-oophorectomy:  - Ovary with stromal hyperthecosis   - Fallopian tube with no significant histologic abnormality  - Negative for atypia or malignancy     C. Uterus and cervix, total abdominal hysterectomy:  - Endometrial endometrioid adenocarcinoma, FIGO grade 2, non-myoinvasive, see comment  - Adenomyosis involved by adenocarcinoma  - Background of benign endometrial polyps  - Cervix with no significant histologic abnormality     D. Residual cervix, resection:  - Cervical tissue with no significant histologic abnormality     E. Lymph nodes, left pelvic, dissection:  - Five reactive lymph nodes examined, negative for malignancy (0/5)     F. Lymph nodes, left para-aortic, dissection:  - Three reactive lymph nodes examined, negative for malignancy (0/3)     G. Lymph nodes, right pelvic, dissection:  - Seven reactive lymph nodes examined, negative for malignancy (0/7)     H. Lymph nodes, right para-aortic, dissection:  - Three reactive lymph nodes examined, negative for malignancy (0/3)   Electronically signed by Martha Solorio MD on 6/15/2023 at  4:03 PM           Assessment:    Kylah Ojeda is a 62 year old woman with a new diagnosis of stage 1A gr 2 endometrial  cancer        Plan:     1.)   Endometrial cancer: Low risk pathology despite large 10cm tumor. I do not recommend adjuvant treatment. Discussed risk of recurrence and surveillance recommendations. Discussed lifestyle modifications including weight loss and pre-DM management. She does have  PCP whom she needs to see to discuss general health issues.     - Followup q 6 months x 2 years, then annually with pelvic exam. OK to see NP     2.)Genetic risk factors were assessed: mmr in act, no indication for referral    3.) Health maintenance: Needs PCP asap.     4.) Postop: No issues.       25 minutes total time today, 10 minutes doing postop care the remaining time discussing the above issues.       Karena Dunlap MD    Department of Ob/Gyn and Women's Health  Division of Gynecologic Oncology  [facility]  Pager [phone]

## 2023-07-12 NOTE — PROGRESS NOTES
"Oncology Rooming Note    July 12, 2023 1:14 PM   Kylah Ojeda is a 62 year old female who presents for:    Chief Complaint   Patient presents with     Oncology Clinic Visit     Endometrial cancer (H)     Initial Vitals: BP (!) 148/85 (BP Location: Left arm, Patient Position: Sitting, Cuff Size: Adult Large)   Pulse 104   Temp 98  F (36.7  C) (Oral)   Resp 20   Wt 76.1 kg (167 lb 12.8 oz)   LMP  (LMP Unknown)   SpO2 98%   BMI 36.31 kg/m   Estimated body mass index is 36.31 kg/m  as calculated from the following:    Height as of 7/5/23: 1.448 m (4' 9\").    Weight as of this encounter: 76.1 kg (167 lb 12.8 oz). Body surface area is 1.75 meters squared.  No Pain (0) Comment: Data Unavailable   No LMP recorded (lmp unknown). Patient has had a hysterectomy.  Allergies reviewed: Yes  Medications reviewed: Yes    Medications: Medication refills not needed today.  Pharmacy name entered into [facility]: [facility] - [place], [place] - [address]    Clinical concerns: no        Louisa Babin CMA              "

## 2023-08-01 ASSESSMENT — ENCOUNTER SYMPTOMS
MYALGIAS: 0
COUGH: 0
ABDOMINAL PAIN: 1
CONSTIPATION: 0
HEMATOCHEZIA: 0
DIZZINESS: 0
CHILLS: 0
ARTHRALGIAS: 0
FREQUENCY: 0
EYE PAIN: 0
BREAST MASS: 0
WEAKNESS: 0
NERVOUS/ANXIOUS: 0
FEVER: 0
SORE THROAT: 0
HEADACHES: 0
JOINT SWELLING: 0
HEARTBURN: 0
PARESTHESIAS: 0
HEMATURIA: 0
DIARRHEA: 0
PALPITATIONS: 0
NAUSEA: 0
DYSURIA: 0
SHORTNESS OF BREATH: 0

## 2023-08-08 ENCOUNTER — OFFICE VISIT (OUTPATIENT)
Dept: FAMILY MEDICINE | Facility: CLINIC | Age: 63
End: 2023-08-08
Payer: COMMERCIAL

## 2023-08-08 VITALS
BODY MASS INDEX: 35.48 KG/M2 | SYSTOLIC BLOOD PRESSURE: 150 MMHG | RESPIRATION RATE: 19 BRPM | HEIGHT: 58 IN | HEART RATE: 83 BPM | TEMPERATURE: 97.9 F | WEIGHT: 169 LBS | OXYGEN SATURATION: 99 % | DIASTOLIC BLOOD PRESSURE: 77 MMHG

## 2023-08-08 DIAGNOSIS — Z12.31 ENCOUNTER FOR SCREENING MAMMOGRAM FOR BREAST CANCER: ICD-10-CM

## 2023-08-08 DIAGNOSIS — Z13.220 LIPID SCREENING: ICD-10-CM

## 2023-08-08 DIAGNOSIS — E11.9 TYPE 2 DIABETES MELLITUS WITHOUT COMPLICATION, WITHOUT LONG-TERM CURRENT USE OF INSULIN (H): ICD-10-CM

## 2023-08-08 DIAGNOSIS — Z11.59 NEED FOR HEPATITIS C SCREENING TEST: ICD-10-CM

## 2023-08-08 DIAGNOSIS — Z00.00 ROUTINE GENERAL MEDICAL EXAMINATION AT A HEALTH CARE FACILITY: Primary | ICD-10-CM

## 2023-08-08 DIAGNOSIS — I10 ESSENTIAL HYPERTENSION WITH GOAL BLOOD PRESSURE LESS THAN 140/90: ICD-10-CM

## 2023-08-08 DIAGNOSIS — Z23 ENCOUNTER FOR VACCINATION: ICD-10-CM

## 2023-08-08 DIAGNOSIS — Z12.11 SCREENING FOR MALIGNANT NEOPLASM OF COLON: ICD-10-CM

## 2023-08-08 DIAGNOSIS — Z11.4 SCREENING FOR HUMAN IMMUNODEFICIENCY VIRUS: ICD-10-CM

## 2023-08-08 DIAGNOSIS — Z12.83 SKIN CANCER SCREENING: ICD-10-CM

## 2023-08-08 LAB
CHOLEST SERPL-MCNC: 249 MG/DL
CREAT UR-MCNC: 120 MG/DL
HDLC SERPL-MCNC: 40 MG/DL
LDLC SERPL CALC-MCNC: 141 MG/DL
MICROALBUMIN UR-MCNC: 205 MG/L
MICROALBUMIN/CREAT UR: 170.83 MG/G CR (ref 0–25)
NONHDLC SERPL-MCNC: 209 MG/DL
TRIGL SERPL-MCNC: 339 MG/DL

## 2023-08-08 PROCEDURE — 99396 PREV VISIT EST AGE 40-64: CPT | Mod: 25 | Performed by: FAMILY MEDICINE

## 2023-08-08 PROCEDURE — 90471 IMMUNIZATION ADMIN: CPT | Performed by: FAMILY MEDICINE

## 2023-08-08 PROCEDURE — 90472 IMMUNIZATION ADMIN EACH ADD: CPT | Performed by: FAMILY MEDICINE

## 2023-08-08 PROCEDURE — 87389 HIV-1 AG W/HIV-1&-2 AB AG IA: CPT | Performed by: FAMILY MEDICINE

## 2023-08-08 PROCEDURE — 82570 ASSAY OF URINE CREATININE: CPT | Performed by: FAMILY MEDICINE

## 2023-08-08 PROCEDURE — 86803 HEPATITIS C AB TEST: CPT | Performed by: FAMILY MEDICINE

## 2023-08-08 PROCEDURE — 99214 OFFICE O/P EST MOD 30 MIN: CPT | Mod: 25 | Performed by: FAMILY MEDICINE

## 2023-08-08 PROCEDURE — 36415 COLL VENOUS BLD VENIPUNCTURE: CPT | Performed by: FAMILY MEDICINE

## 2023-08-08 PROCEDURE — 90677 PCV20 VACCINE IM: CPT | Performed by: FAMILY MEDICINE

## 2023-08-08 PROCEDURE — 90746 HEPB VACCINE 3 DOSE ADULT IM: CPT | Performed by: FAMILY MEDICINE

## 2023-08-08 PROCEDURE — 80061 LIPID PANEL: CPT | Performed by: FAMILY MEDICINE

## 2023-08-08 PROCEDURE — 82043 UR ALBUMIN QUANTITATIVE: CPT | Performed by: FAMILY MEDICINE

## 2023-08-08 ASSESSMENT — ENCOUNTER SYMPTOMS
CHILLS: 0
CONSTIPATION: 0
NERVOUS/ANXIOUS: 0
SHORTNESS OF BREATH: 0
BREAST MASS: 0
PARESTHESIAS: 0
PALPITATIONS: 0
WEAKNESS: 0
HEADACHES: 0
NAUSEA: 0
HEARTBURN: 0
MYALGIAS: 0
DIZZINESS: 0
DIARRHEA: 0
HEMATURIA: 0
FREQUENCY: 0
FEVER: 0
EYE PAIN: 0
JOINT SWELLING: 0
HEMATOCHEZIA: 0
DYSURIA: 0
ABDOMINAL PAIN: 1
COUGH: 0
ARTHRALGIAS: 0
SORE THROAT: 0

## 2023-08-08 ASSESSMENT — PAIN SCALES - GENERAL: PAINLEVEL: NO PAIN (0)

## 2023-08-08 NOTE — PROGRESS NOTES
SUBJECTIVE:   CC: Kylah is an 62 year old who presents for preventive health visit.       2023     9:13 AM   Additional Questions   Roomed by Karen GERBER     Healthy Habits:     Getting at least 3 servings of Calcium per day:  Yes    Bi-annual eye exam:  NO    Dental care twice a year:  NO    Sleep apnea or symptoms of sleep apnea:  None    Diet:  Low salt, Low fat/cholesterol and Vegetarian/vegan    Frequency of exercise:  1 day/week    Duration of exercise:  Less than 15 minutes    Taking medications regularly:  Yes    Medication side effects:  None    Additional concerns today:  Yes    Other concerns:  Takes amlodipine 10 mg daily for high blood pressure. Checks BP at home intermittently. States has had more sodium in diet recently and not watching what she eats. Would like to make some changes before making med adjustments.    DM2: diet controlled.    BP Readings from Last 6 Encounters:   23 (!) 150/77   23 (!) 148/85   23 122/68   23 (!) 155/81   23 (!) 173/60        Have you ever done Advance Care Planning? (For example, a Health Directive, POLST, or a discussion with a medical provider or your loved ones about your wishes): No, advance care planning information given to patient to review.  Advanced care planning was discussed at today's visit.    Social History     Tobacco Use    Smoking status: Never    Smokeless tobacco: Never    Tobacco comments:     Parents smoked when I was young.   Substance Use Topics    Alcohol use: Not Currently     Comment: I drink once or twice a year.             2023     1:38 PM   Alcohol Use   Prescreen: >3 drinks/day or >7 drinks/week? No          No data to display              Reviewed orders with patient.  Reviewed health maintenance and updated orders accordingly - Yes      Breast Cancer Screenin/1/2023     1:40 PM   Breast CA Risk Assessment (FHS-7)   Do you have a family history of breast, colon, or ovarian cancer? No /  "Unknown         Mammogram Screening: Recommended mammography every 1-2 years with patient discussion and risk factor consideration  Pertinent mammograms are reviewed under the imaging tab.    History of abnormal Pap smear: Status post hysterectomy. Pap not indicated.     Reviewed and updated as needed this visit by clinical staff   Tobacco  Allergies  Meds     Fam Hx          Reviewed and updated as needed this visit by Provider   Tobacco       [facility] Hx             Review of Systems   Constitutional:  Negative for chills and fever.   HENT:  Positive for hearing loss. Negative for congestion, ear pain and sore throat.    Eyes:  Negative for pain and visual disturbance.   Respiratory:  Negative for cough and shortness of breath.    Cardiovascular:  Negative for chest pain, palpitations and peripheral edema.   Gastrointestinal:  Positive for abdominal pain. Negative for constipation, diarrhea, heartburn, hematochezia and nausea.   Breasts:  Negative for tenderness, breast mass and discharge.   Genitourinary:  Negative for dysuria, frequency, genital sores, hematuria, pelvic pain, urgency, vaginal bleeding and vaginal discharge.   Musculoskeletal:  Negative for arthralgias, joint swelling and myalgias.   Skin:  Negative for rash.   Neurological:  Negative for dizziness, weakness, headaches and paresthesias.   Psychiatric/Behavioral:  Negative for mood changes. The patient is not nervous/anxious.           OBJECTIVE:   BP (!) 150/77 (BP Location: Right arm, Patient Position: Sitting, Cuff Size: Adult Regular)   Pulse 83   Temp 97.9  F (36.6  C) (Temporal)   Resp 19   Ht 1.474 m (4' 10.05\")   Wt 76.7 kg (169 lb)   LMP  (LMP Unknown)   SpO2 99%   BMI 35.26 kg/m    Physical Exam  GENERAL: healthy, alert and no distress  EYES: Eyes grossly normal to inspection, PERRL and conjunctivae and sclerae normal  HENT: ear canals and TM's normal, nose and mouth without ulcers or lesions  NECK: no adenopathy, no asymmetry, " masses, or scars and thyroid normal to palpation  RESP: lungs clear to auscultation - no rales, rhonchi or wheezes  CV: regular rate and rhythm, normal S1 S2, no S3 or S4, no murmur, click or rub, no peripheral edema and peripheral pulses strong  ABDOMEN: soft, nontender, no hepatosplenomegaly, no masses and bowel sounds normal  MS: no gross musculoskeletal defects noted, no edema  SKIN: no suspicious lesions or rashes  NEURO: Normal strength and tone, mentation intact and speech normal  PSYCH: mentation appears normal, affect normal/bright        ASSESSMENT/PLAN:   Kylah was seen today for physical.    Diagnoses and all orders for this visit:    Routine general medical examination at a health care facility    Lipid screening  -     Lipid panel reflex to direct LDL Non-fasting    Need for hepatitis C screening test  -     Hepatitis C Screen Reflex to HCV RNA Quant and Genotype    Screening for human immunodeficiency virus  -     HIV Antigen Antibody Combo Cascade    Encounter for vaccination  -     HEPATITIS B, ADULT (ENGERIX-B/RECOMBIVAX HB)  -     PNEUMOCOCCAL 20 VALENT CONJUGATE (PREVNAR 20)    Screening for malignant neoplasm of colon  -     COLOGUARD(EX[employer] SCIENCES)    Skin cancer screening  -     Adult Dermatology Referral; Future    Encounter for screening mammogram for breast cancer  -     MA Screening Digital Bilateral; Future    Type 2 diabetes mellitus without complication, without long-term current use of insulin (H)  -Well controlled with diet and lifestyle modifications  -     Adult Eye  Referral; Future  -     Albumin Random Urine Quantitative with Creat Ratio    Essential hypertension with goal blood pressure less than 140/90  -Elevated today. Discussed checking BP at home 3-4 times a week. Discussed dietary and lifestyle changes.  -Continue amlodipine  -Follow-up in 8 weeks for monitoring as scheduled after making lifestyle changes            COUNSELING:  Reviewed preventive health  "counseling, as reflected in patient instructions       Regular exercise       Healthy diet/nutrition      BMI:   Estimated body mass index is 35.26 kg/m  as calculated from the following:    Height as of this encounter: 1.474 m (4' 10.05\").    Weight as of this encounter: 76.7 kg (169 lb).         She reports that she has never smoked. She has never used smokeless tobacco.    Malcolm Yu DO  [facility]  "

## 2023-08-08 NOTE — NURSING NOTE
Prior to immunization administration, verified patients identity using patient s name and date of birth. Please see Immunization Activity for additional information.     Screening Questionnaire for Adult Immunization    Are you sick today?   No   Do you have allergies to medications, food, a vaccine component or latex?   codeine   Have you ever had a serious reaction after receiving a vaccination?   No   Do you have a long-term health problem with heart, lung, kidney, or metabolic disease (e.g., diabetes), asthma, a blood disorder, no spleen, complement component deficiency, a cochlear implant, or a spinal fluid leak?  Are you on long-term aspirin therapy?   Pre-diabetes   Do you have cancer, leukemia, HIV/AIDS, or any other immune system problem?   No   Do you have a parent, brother, or sister with an immune system problem?   No   In the past 3 months, have you taken medications that affect  your immune system, such as prednisone, other steroids, or anticancer drugs; drugs for the treatment of rheumatoid arthritis, Crohn s disease, or psoriasis; or have you had radiation treatments?   No   Have you had a seizure, or a brain or other nervous system problem?   No   During the past year, have you received a transfusion of blood or blood    products, or been given immune (gamma) globulin or antiviral drug?   Hysterectomy in June    For women: Are you pregnant or is there a chance you could become       pregnant during the next month?   No   Have you received any vaccinations in the past 4 weeks?   No     Immunization questionnaire was positive for at least one answer.      Patient instructed to remain in clinic for 15 minutes afterwards, and to report any adverse reactions.     Screening performed by Patricia Aguiar on 8/8/2023 at 10:12 AM.

## 2023-08-09 LAB
HCV AB SERPL QL IA: NONREACTIVE
HIV 1+2 AB+HIV1 P24 AG SERPL QL IA: NONREACTIVE

## 2023-08-21 ENCOUNTER — MYC MEDICAL ADVICE (OUTPATIENT)
Dept: FAMILY MEDICINE | Facility: CLINIC | Age: 63
End: 2023-08-21
Payer: COMMERCIAL

## 2023-08-23 NOTE — TELEPHONE ENCOUNTER
Tried to call patient to make appointment within that time frame. Pt phone is busy will send mychart.

## 2023-09-02 LAB — NONINV COLON CA DNA+OCC BLD SCRN STL QL: NEGATIVE

## 2023-09-08 ENCOUNTER — ANCILLARY PROCEDURE (OUTPATIENT)
Dept: MAMMOGRAPHY | Facility: CLINIC | Age: 63
End: 2023-09-08
Attending: FAMILY MEDICINE
Payer: COMMERCIAL

## 2023-09-08 ENCOUNTER — ANCILLARY ORDERS (OUTPATIENT)
Dept: FAMILY MEDICINE | Facility: CLINIC | Age: 63
End: 2023-09-08

## 2023-09-08 DIAGNOSIS — Z00.00 ROUTINE GENERAL MEDICAL EXAMINATION AT A HEALTH CARE FACILITY: Primary | ICD-10-CM

## 2023-09-08 DIAGNOSIS — I10 ESSENTIAL HYPERTENSION WITH GOAL BLOOD PRESSURE LESS THAN 140/90: ICD-10-CM

## 2023-09-08 DIAGNOSIS — Z12.31 ENCOUNTER FOR SCREENING MAMMOGRAM FOR BREAST CANCER: ICD-10-CM

## 2023-09-08 DIAGNOSIS — Z12.83 SKIN CANCER SCREENING: ICD-10-CM

## 2023-09-08 DIAGNOSIS — Z11.4 SCREENING FOR HUMAN IMMUNODEFICIENCY VIRUS: ICD-10-CM

## 2023-09-08 DIAGNOSIS — Z13.220 LIPID SCREENING: ICD-10-CM

## 2023-09-08 DIAGNOSIS — E11.9 TYPE 2 DIABETES MELLITUS WITHOUT COMPLICATION, WITHOUT LONG-TERM CURRENT USE OF INSULIN (H): ICD-10-CM

## 2023-09-08 DIAGNOSIS — Z11.59 NEED FOR HEPATITIS C SCREENING TEST: ICD-10-CM

## 2023-09-08 DIAGNOSIS — Z23 ENCOUNTER FOR VACCINATION: ICD-10-CM

## 2023-09-08 DIAGNOSIS — Z12.11 SCREENING FOR MALIGNANT NEOPLASM OF COLON: ICD-10-CM

## 2023-09-08 PROCEDURE — 77067 SCR MAMMO BI INCL CAD: CPT | Performed by: STUDENT IN AN ORGANIZED HEALTH CARE EDUCATION/TRAINING PROGRAM

## 2023-09-08 PROCEDURE — 77063 BREAST TOMOSYNTHESIS BI: CPT | Performed by: STUDENT IN AN ORGANIZED HEALTH CARE EDUCATION/TRAINING PROGRAM

## 2023-09-13 ENCOUNTER — TELEPHONE (OUTPATIENT)
Dept: FAMILY MEDICINE | Facility: CLINIC | Age: 63
End: 2023-09-13
Payer: COMMERCIAL

## 2023-09-13 NOTE — TELEPHONE ENCOUNTER
FYI~ I have approved Kylah for up to $500 of the Pharmacy Assistance Fund to be applied to her outstanding [facility] bill.    Kylah has been informed.    Jesica Garza  Prescription Assistance Supervisor  Pharmacy Assistance

## 2023-10-02 ENCOUNTER — TELEPHONE (OUTPATIENT)
Dept: NURSING | Facility: CLINIC | Age: 63
End: 2023-10-02
Payer: COMMERCIAL

## 2023-10-02 ENCOUNTER — TELEPHONE (OUTPATIENT)
Dept: FAMILY MEDICINE | Facility: CLINIC | Age: 63
End: 2023-10-02
Payer: COMMERCIAL

## 2023-10-02 NOTE — TELEPHONE ENCOUNTER
Patient calling to reschedule appointment due to cold symptoms. Patient has not done a home covid test stating that she does not trust them due to false positives.  Transferred to scheduling to reschedule appointment.   Inocencia Maldonado RN   10/02/23 7:58 AM  [facility] Nurse Advisor

## 2023-10-02 NOTE — TELEPHONE ENCOUNTER
Medication Question or Refill        What medication are you calling about (include dose and sig)?: amLODIPine (NORVASC) 10 MG tablet     Preferred Pharmacy:{All of the patient's preferred pharmacies will display below, confirm with patient which pharmacy they prefer to use for this request and delete the others :[id number]}   [facility] - [place], [place] [address] [place] [address]  Phone: [phone] Fax: [phone]      Controlled Substance Agreement on file:   CSA -- Patient Level:    CSA: None found at the patient level.       Who prescribed the medication?: primary    Do you need a refill? Yes    When did you use the medication last? 7/5    Patient offered an appointment? Yes: ***    Do you have any questions or concerns?  {YES (EXPLAIN)/NO:[id number]}      Could we send this information to you in [facility] or would you prefer to receive a phone call?:   {CALL BACK METHOD:[id number]}

## 2023-10-03 ENCOUNTER — TELEPHONE (OUTPATIENT)
Dept: OPHTHALMOLOGY | Facility: CLINIC | Age: 63
End: 2023-10-03

## 2023-10-03 NOTE — TELEPHONE ENCOUNTER
Spoke with patient regarding scheduling from the wait-list for a sooner appointment. Patient declined scheduling as offered and was removed from the wait-list.-Per Patient

## 2023-10-05 NOTE — TELEPHONE ENCOUNTER
Writer called and left message on patient's voicemail to call back and speak with a triage nurse. Let patient know that the amlodipine has a refill at the pharmacy.     Okay to leave a detailed message?: Yes at Home number on file [phone] (home).    MARCIANO LuciaN LIYA  [facility]

## 2023-10-06 NOTE — TELEPHONE ENCOUNTER
Patient returned call, message below from RN team was relayed to patient. She verbalized understanding.     Nata Pinto RN BSN  [facility]

## 2023-10-18 ENCOUNTER — OFFICE VISIT (OUTPATIENT)
Dept: OPHTHALMOLOGY | Facility: CLINIC | Age: 63
End: 2023-10-18
Attending: FAMILY MEDICINE
Payer: COMMERCIAL

## 2023-10-18 DIAGNOSIS — E11.9 TYPE 2 DIABETES MELLITUS WITHOUT COMPLICATION, WITHOUT LONG-TERM CURRENT USE OF INSULIN (H): Primary | ICD-10-CM

## 2023-10-18 DIAGNOSIS — H52.13 MYOPIA OF BOTH EYES: ICD-10-CM

## 2023-10-18 DIAGNOSIS — H02.88B MEIBOMIAN GLAND DYSFUNCTION (MGD) OF UPPER AND LOWER LIDS OF BOTH EYES: ICD-10-CM

## 2023-10-18 DIAGNOSIS — H25.813 COMBINED FORMS OF AGE-RELATED CATARACT OF BOTH EYES: ICD-10-CM

## 2023-10-18 DIAGNOSIS — H02.88A MEIBOMIAN GLAND DYSFUNCTION (MGD) OF UPPER AND LOWER LIDS OF BOTH EYES: ICD-10-CM

## 2023-10-18 DIAGNOSIS — H43.813 POSTERIOR VITREOUS DETACHMENT, BOTH EYES: ICD-10-CM

## 2023-10-18 DIAGNOSIS — H35.413 BILATERAL RETINAL LATTICE DEGENERATION: ICD-10-CM

## 2023-10-18 PROCEDURE — 92004 COMPRE OPH EXAM NEW PT 1/>: CPT | Performed by: OPTOMETRIST

## 2023-10-18 ASSESSMENT — CONF VISUAL FIELD
OD_SUPERIOR_NASAL_RESTRICTION: 0
OS_NORMAL: 1
OS_INFERIOR_TEMPORAL_RESTRICTION: 0
OD_INFERIOR_NASAL_RESTRICTION: 0
OD_SUPERIOR_TEMPORAL_RESTRICTION: 0
OS_SUPERIOR_TEMPORAL_RESTRICTION: 0
OD_NORMAL: 1
OD_INFERIOR_TEMPORAL_RESTRICTION: 0
OS_INFERIOR_NASAL_RESTRICTION: 0
OS_SUPERIOR_NASAL_RESTRICTION: 0

## 2023-10-18 ASSESSMENT — TONOMETRY
OS_IOP_MMHG: 16
OD_IOP_MMHG: 17
IOP_METHOD: ICARE

## 2023-10-18 ASSESSMENT — SLIT LAMP EXAM - LIDS
COMMENTS: 1+ MGD
COMMENTS: TR MGD

## 2023-10-18 ASSESSMENT — VISUAL ACUITY
OD_PH_CC+: -3
OD_CC+: -1
CORRECTION_TYPE: GLASSES
OS_CC+: -2
OS_CC: 20/25
OD_CC: 20/30
OD_PH_CC: 20/25
METHOD: SNELLEN - LINEAR

## 2023-10-18 ASSESSMENT — CUP TO DISC RATIO
OS_RATIO: 0.1
OD_RATIO: 0.1

## 2023-10-18 ASSESSMENT — EXTERNAL EXAM - RIGHT EYE: OD_EXAM: NORMAL

## 2023-10-18 ASSESSMENT — EXTERNAL EXAM - LEFT EYE: OS_EXAM: NORMAL

## 2023-10-18 NOTE — PROGRESS NOTES
History  HPI       Annual Eye Exam    In both eyes.  Charactertized as  blurred vision.  Context:  distance vision.  Associated symptoms include floaters (Longstanding).  Negative for dryness, eye pain, redness and flashes.  Treatments tried include glasses.  Response to treatment was issue resolved.  Pain was noted as 0/10.             Comments    The patient presents for comprehensive eye exam. Last eye exam was 2 years ago, was told she had early cataracts.  Also, recently diagnosed with pre-diabetes.  Declines refraction at this time.          Last edited by Jeramy Ignacio, OD on 10/18/2023  9:22 AM.          Assessment/Plan  (E11.9) Type 2 diabetes mellitus without complication, without long-term current use of insulin (H)  (primary encounter diagnosis)  Comment: No retinopathy  Plan:  Educated patient on clinical findings and the importance of continued management with primary care physician. Continue management as directed and return to clinic in 1 year for dilated exam, or sooner, as needed. Copy of chart sent to Dr. Yu.    (H25.813) Combined forms of age-related cataract of both eyes  Comment: Nuclear sclerosis and PSC both eyes, not visually significant;   Plan:  No treatment indicated at this time. Monitor annually.    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 If symptoms present, return to clinic immediately.    (H02.88A,  H02.88B) Meibomian gland dysfunction (MGD) of upper and lower lids of both eyes  Comment: Asymptomatic  Plan:  Recommended artificial tears and warm compresses as needed. Monitor annually.    (H52.13) Myopia of both eyes  Comment: Patient declined refraction    Return to clinic in 1 year for comprehensive eye exam.    Complete documentation of  historical and exam elements from today's encounter can  be found in the full encounter summary report (not reduplicated in this progress  note). I personally obtained the chief complaint(s) and history of present illness. I  confirmed and edited as necessary the review of systems, past medical/surgical  history, family history, social history, and examination findings as documented by  others; and I examined the patient myself. I personally reviewed the relevant tests,  images, and reports as documented above. I formulated and edited as necessary the  assessment and plan and discussed the findings and management plan with the  patient and family.    Jeramy Ignacio OD, FAAO

## 2023-10-18 NOTE — Clinical Note
Thank you for referring Kylah Ojeda for her annual eye exam. No diabetic retinopathy noted on examination today. Recommended repeat evaluation in 1 year. Please contact me with any questions. Jeramy Ignacio, OD on 10/18/2023 at 10:06 AM

## 2024-01-08 ENCOUNTER — OFFICE VISIT (OUTPATIENT)
Dept: FAMILY MEDICINE | Facility: CLINIC | Age: 64
End: 2024-01-08
Payer: COMMERCIAL

## 2024-01-08 VITALS
BODY MASS INDEX: 36.54 KG/M2 | HEIGHT: 58 IN | TEMPERATURE: 98.8 F | OXYGEN SATURATION: 98 % | RESPIRATION RATE: 21 BRPM | DIASTOLIC BLOOD PRESSURE: 68 MMHG | SYSTOLIC BLOOD PRESSURE: 140 MMHG | WEIGHT: 174.1 LBS | HEART RATE: 90 BPM

## 2024-01-08 DIAGNOSIS — E11.9 TYPE 2 DIABETES MELLITUS WITHOUT COMPLICATION, WITHOUT LONG-TERM CURRENT USE OF INSULIN (H): Primary | ICD-10-CM

## 2024-01-08 DIAGNOSIS — I10 ESSENTIAL HYPERTENSION WITH GOAL BLOOD PRESSURE LESS THAN 140/90: ICD-10-CM

## 2024-01-08 DIAGNOSIS — K06.8 GUMS, BLEEDING: ICD-10-CM

## 2024-01-08 DIAGNOSIS — R80.9 MICROALBUMINURIA: ICD-10-CM

## 2024-01-08 LAB
CREAT UR-MCNC: 113 MG/DL
HBA1C MFR BLD: 6.2 % (ref 0–5.6)
MICROALBUMIN UR-MCNC: 302 MG/L
MICROALBUMIN/CREAT UR: 267.26 MG/G CR (ref 0–25)

## 2024-01-08 PROCEDURE — 83036 HEMOGLOBIN GLYCOSYLATED A1C: CPT | Performed by: FAMILY MEDICINE

## 2024-01-08 PROCEDURE — 99214 OFFICE O/P EST MOD 30 MIN: CPT | Performed by: FAMILY MEDICINE

## 2024-01-08 PROCEDURE — 36415 COLL VENOUS BLD VENIPUNCTURE: CPT | Performed by: FAMILY MEDICINE

## 2024-01-08 PROCEDURE — 82570 ASSAY OF URINE CREATININE: CPT | Performed by: FAMILY MEDICINE

## 2024-01-08 PROCEDURE — 82043 UR ALBUMIN QUANTITATIVE: CPT | Performed by: FAMILY MEDICINE

## 2024-01-08 RX ORDER — AMLODIPINE BESYLATE 10 MG/1
10 TABLET ORAL DAILY
Qty: 90 TABLET | Refills: 2 | Status: SHIPPED | OUTPATIENT
Start: 2024-01-08 | End: 2024-10-07

## 2024-01-08 ASSESSMENT — PAIN SCALES - GENERAL: PAINLEVEL: NO PAIN (0)

## 2024-01-08 NOTE — PROGRESS NOTES
Assessment & Plan     Type 2 diabetes mellitus without complication, without long-term current use of insulin (H)  -Well controlled due for A1c today  - HEMOGLOBIN A1C    Essential hypertension with goal blood pressure less than 140/90  Microalbuminuria  -Not at goal. Discussed elevated BP and microalbuminuria. Recommended starting losartan for BP and kidney protection.  -Pt prefers to recheck labs today. Wants to try 90 days of lifestyle changes and weight loss.  - amLODIPine (NORVASC) 10 MG tablet  Dispense: 90 tablet; Refill: 2  - Albumin Random Urine Quantitative with Creat Ratio    Gums, bleeding  -Two episodes, spontaneously resolved  -Recommended dental appt as bleeding gums usually sign of gingivitis.       Malcolm Aimee   [facility]    Nahed Montero is a 63 year old, presenting for the following health issues:  Hypertension        1/8/2024    11:17 AM   Additional Questions   Roomed by MELISSA Koch   Accompanied by self         1/8/2024    11:17 AM   Patient Reported Additional Medications   Patient reports taking the following new medications none     Here to day for BP recheck. Intermittently checking BP at home. Readings 138-144 systolic. Plan back in 8/2023 was for patient to eat better, exercise more and lose weight. She reports that she has not been adherent to her plan. Would like to try to improve things over then next 90 days. Worried about adding on new blood pressure medications, has known people with side effects to medications.     Hx of DM2, diet controlled.    Sudden gum bleeding last few months. Spontaneously resolves.       History of Present Illness       Diabetes:   She presents for follow up of diabetes.    She is not checking blood glucose.         She has no concerns regarding her diabetes at this time.   She is not experiencing numbness or burning in feet, excessive thirst, blurry vision, weight changes or redness, sores or blisters on feet.    "        Hyperlipidemia:  She presents for follow up of hyperlipidemia.   She is not taking medication to lower cholesterol. She is not having myalgia or other side effects to statin medications.    Hypertension: She presents for follow up of hypertension.  She does not check blood pressure  regularly outside of the clinic. Outside blood pressures have been over 140/90. She does not follow a low salt diet.     She eats 2-3 servings of fruits and vegetables daily.She consumes 0 sweetened beverage(s) daily.She exercises with enough effort to increase her heart rate 20 to 29 minutes per day.  She exercises with enough effort to increase her heart rate 3 or less days per week.   She is taking medications regularly.                 Review of Systems         Objective    BP (!) 140/70 (BP Location: Right arm, Patient Position: Sitting, Cuff Size: Adult Regular)   Pulse 90   Temp 98.8  F (37.1  C) (Temporal)   Resp 21   Ht 1.473 m (4' 10\")   Wt 79 kg (174 lb 1.6 oz)   LMP  (LMP Unknown)   SpO2 98%   BMI 36.39 kg/m    Body mass index is 36.39 kg/m .  Physical Exam   GENERAL: healthy, alert and no distress  RESP: lungs clear to auscultation - no rales, rhonchi or wheezes  CV: regular rate and rhythm, normal S1 S2, no S3 or S4, no murmur, click or rub, no peripheral edema and peripheral pulses strong  PSYCH: mentation appears normal, affect normal/bright                      "

## 2024-01-09 ENCOUNTER — MYC MEDICAL ADVICE (OUTPATIENT)
Dept: FAMILY MEDICINE | Facility: CLINIC | Age: 64
End: 2024-01-09
Payer: COMMERCIAL

## 2024-01-09 DIAGNOSIS — I10 ESSENTIAL HYPERTENSION WITH GOAL BLOOD PRESSURE LESS THAN 140/90: Primary | ICD-10-CM

## 2024-01-09 RX ORDER — LOSARTAN POTASSIUM 25 MG/1
25 TABLET ORAL DAILY
Qty: 90 TABLET | Refills: 1 | Status: SHIPPED | OUTPATIENT
Start: 2024-01-09 | End: 2024-04-15

## 2024-01-09 NOTE — TELEPHONE ENCOUNTER
Yes, take new med with current medication.    Rec: aerobic exercise, light weight training, lean protein and complex carbs.    Malcolm Yu, DO

## 2024-01-10 NOTE — TELEPHONE ENCOUNTER
Dr. Yu: please review message and advise     From 1/8/23 visit:  Essential hypertension with goal blood pressure less than 140/90  Microalbuminuria  -Not at goal. Discussed elevated BP and microalbuminuria. Recommended starting losartan for BP and kidney protection.  -Pt prefers to recheck labs today. Wants to try 90 days of lifestyle changes and weight loss.    Kadi ROSA RN  [facility]

## 2024-01-16 NOTE — TELEPHONE ENCOUNTER
Writer responded via [employer].  MARCIANO CruzN, RN-BC  [facility]

## 2024-01-16 NOTE — TELEPHONE ENCOUNTER
Kylah,    The goal of losartan is not to increase your potassium levels. Losartan is a blood pressure medication that works through the kidneys to lower blood pressure and protect against further kidney damage.     The microalbuminuria in your urine is a sign of early kidney damage from high blood pressure, losartan helps to stop further, possibly permanent kidney damage.     I will see you in 3 months for follow-up.    Malcolm Yu, DO

## 2024-02-23 ENCOUNTER — APPOINTMENT (OUTPATIENT)
Dept: OPHTHALMOLOGY | Facility: CLINIC | Age: 64
End: 2024-02-23
Payer: COMMERCIAL

## 2024-02-23 ASSESSMENT — VISUAL ACUITY
OS_CC+: -1
METHOD: SNELLEN - LINEAR
OS_CC: 20/40
OD_CC: 20/30
CORRECTION_TYPE: GLASSES

## 2024-02-23 ASSESSMENT — REFRACTION_MANIFEST
OS_SPHERE: -12.25
OD_AXIS: 094
OS_ADD: +2.25
OD_CYLINDER: +1.50
OS_AXIS: 107
OD_ADD: +2.25
OS_CYLINDER: +0.25
OD_SPHERE: -13.25

## 2024-02-23 ASSESSMENT — REFRACTION_WEARINGRX
OS_AXIS: 058
OS_CYLINDER: +1.50
OD_SPHERE: -13.25
OS_SPHERE: -13.25
OD_CYLINDER: +1.50
OD_AXIS: 115

## 2024-04-15 ENCOUNTER — OFFICE VISIT (OUTPATIENT)
Dept: FAMILY MEDICINE | Facility: CLINIC | Age: 64
End: 2024-04-15
Payer: COMMERCIAL

## 2024-04-15 VITALS
WEIGHT: 175 LBS | HEIGHT: 58 IN | HEART RATE: 93 BPM | TEMPERATURE: 97.5 F | OXYGEN SATURATION: 97 % | DIASTOLIC BLOOD PRESSURE: 85 MMHG | SYSTOLIC BLOOD PRESSURE: 146 MMHG | RESPIRATION RATE: 18 BRPM | BODY MASS INDEX: 36.73 KG/M2

## 2024-04-15 DIAGNOSIS — Z13.0 SCREENING FOR DEFICIENCY ANEMIA: ICD-10-CM

## 2024-04-15 DIAGNOSIS — I10 ESSENTIAL HYPERTENSION WITH GOAL BLOOD PRESSURE LESS THAN 140/90: ICD-10-CM

## 2024-04-15 DIAGNOSIS — E11.9 TYPE 2 DIABETES MELLITUS WITHOUT COMPLICATION, WITHOUT LONG-TERM CURRENT USE OF INSULIN (H): Primary | ICD-10-CM

## 2024-04-15 DIAGNOSIS — R80.9 MICROALBUMINURIA: ICD-10-CM

## 2024-04-15 DIAGNOSIS — E78.5 HYPERLIPIDEMIA LDL GOAL <100: ICD-10-CM

## 2024-04-15 PROCEDURE — 99214 OFFICE O/P EST MOD 30 MIN: CPT | Performed by: FAMILY MEDICINE

## 2024-04-15 RX ORDER — LOSARTAN POTASSIUM 25 MG/1
25 TABLET ORAL DAILY
Qty: 90 TABLET | Refills: 1 | Status: SHIPPED | OUTPATIENT
Start: 2024-04-15 | End: 2024-10-07

## 2024-04-15 NOTE — PROGRESS NOTES
"  Assessment & Plan     Type 2 diabetes mellitus without complication, without long-term current use of insulin (H)  -Well controlled with diet, but endorses still consuming high-carb foods. Discussed meeting with diabetic educator for refresher.   - Hemoglobin A1c  - losartan (COZAAR) 25 MG tablet  Dispense: 90 tablet; Refill: 1  - Adult Diabetes Education  Referral  - CT Coronary Calcium Scan    Essential hypertension with goal blood pressure less than 140/90  -Not at goal. Pt now amendable to trying losartan. Continue with amlodipine, start losartan.  -Follow-up in two weeks for MA BP check and lab monitoring on losartan.  - losartan (COZAAR) 25 MG tablet  Dispense: 90 tablet; Refill: 1  - Adult Diabetes Education  Referral  - CT Coronary Calcium Scan    Microalbuminuria  -Worsening. Discussed pathophys of kidney function and damage from HTN and DM2. Needs better BP control, pt amendable to starting losartan. Okay to eat protein, this has no bearing on microalbuminuria.  - Albumin Random Urine Quantitative with Creat Ratio  - Basic metabolic panel  - losartan (COZAAR) 25 MG tablet  Dispense: 90 tablet; Refill: 1  - Adult Diabetes Education  Referral    Hyperlipidemia LDL goal <100  -ASCVD score 19.3%, dicussed increased risk in next 10 years. Recommendation would be to start statin given hx of HLD, DM2 and HTN. Can get Ca score as pt hesitant to start statin.  - CT Coronary Calcium Scan    Screening for deficiency anemia  - CBC with platelets and differential    The longitudinal plan of care for the diagnosis(es)/condition(s) as documented were addressed during this visit. Due to the added complexity in care, I will continue to support Kylah in the subsequent management and with ongoing continuity of care.           BMI  Estimated body mass index is 36.24 kg/m  as calculated from the following:    Height as of this encounter: 1.48 m (4' 10.27\").    Weight as of this encounter: 79.4 kg " "(175 lb).             Nahed Montero is a 63 year old, presenting for the following health issues:  Hypertension      4/15/2024     8:38 AM   Additional Questions   Roomed by Ari     History of Present Illness       Hypertension: She presents for follow up of hypertension.  She does not check blood pressure  regularly outside of the clinic. Outside blood pressures have been over 140/90. She follows a low salt diet.     She eats 2-3 servings of fruits and vegetables daily.She consumes 0 sweetened beverage(s) daily.She exercises with enough effort to increase her heart rate 30 to 60 minutes per day.  She exercises with enough effort to increase her heart rate 4 days per week.   She is taking medications regularly.     Here to follow-up on HTN and DM2.  Has had very good control of DM2, diagnosed when she was at [facility].  Has bee working over the last 3 months to eat better and lose weight. Vegetarian diet, watching her protein intake due to her protein in urine.   Exercising 30 minutes a day.  Still haivng too much sugar.  Drinking mocha fraps still.   Mom with hx of CAD/MI.       The 10-year ASCVD risk score (Shayne CUNNINGHAM, et al., 2019) is: 19.3%    Values used to calculate the score:      Age: 63 years      Sex: Female      Is Non- : No      Diabetic: Yes      Tobacco smoker: No      Systolic Blood Pressure: 146 mmHg      Is BP treated: Yes      HDL Cholesterol: 40 mg/dL      Total Cholesterol: 249 mg/dL     Wt Readings from Last 4 Encounters:   04/15/24 79.4 kg (175 lb)   01/08/24 79 kg (174 lb 1.6 oz)   08/08/23 76.7 kg (169 lb)   07/12/23 76.1 kg (167 lb 12.8 oz)      BP Readings from Last 6 Encounters:   04/15/24 (!) 146/85   01/08/24 (!) 140/68   08/08/23 (!) 150/77   07/12/23 (!) 148/85   07/05/23 122/68   06/20/23 (!) 155/81                    Objective    BP (!) 146/85   Pulse 93   Temp 97.5  F (36.4  C) (Temporal)   Resp 18   Ht 1.48 m (4' 10.27\")   Wt 79.4 kg (175 lb)   LMP  " (LMP Unknown)   SpO2 97%   BMI 36.24 kg/m    Body mass index is 36.24 kg/m .  Physical Exam   GENERAL: alert and no distress  PSYCH: mentation appears normal, affect normal/bright            Signed Electronically by: Malcolm Yu DO

## 2024-04-29 ENCOUNTER — HOSPITAL ENCOUNTER (OUTPATIENT)
Dept: CT IMAGING | Facility: CLINIC | Age: 64
Discharge: HOME | End: 2024-04-29
Attending: FAMILY MEDICINE | Admitting: FAMILY MEDICINE
Payer: COMMERCIAL

## 2024-04-29 DIAGNOSIS — E78.5 HYPERLIPIDEMIA LDL GOAL <100: ICD-10-CM

## 2024-04-29 DIAGNOSIS — I10 ESSENTIAL HYPERTENSION WITH GOAL BLOOD PRESSURE LESS THAN 140/90: ICD-10-CM

## 2024-04-29 DIAGNOSIS — E11.9 TYPE 2 DIABETES MELLITUS WITHOUT COMPLICATION, WITHOUT LONG-TERM CURRENT USE OF INSULIN (H): ICD-10-CM

## 2024-04-29 PROCEDURE — 75571 CT HRT W/O DYE W/CA TEST: CPT | Mod: 26 | Performed by: INTERNAL MEDICINE

## 2024-04-29 PROCEDURE — 75571 CT HRT W/O DYE W/CA TEST: CPT

## 2024-04-29 NOTE — RESULT ENCOUNTER NOTE
Eli Montero,    Thanks for coming to clinic.  The clinician who ordered these tests is currently out of the office, but we wanted to let you know that your results have been reviewed.      Your CT calcium score shows moderate coronary calcifications with a calcium score of 250.  Recommend aggressive risk factor modification may benefit from taking a statin medication to lower cholesterol and cardiovascular risk and consider seeing the cardiologist as well.  Please discuss this with your primary controlling blood pressure as well with medication if consistently over 130 x 80 may also help decrease cardiovascular risk.    Your clinician will review your results upon their return and may get back to you with further information if needed.      Dr. Pinky Herndon MD / [facility]

## 2024-05-08 ENCOUNTER — ALLIED HEALTH/NURSE VISIT (OUTPATIENT)
Dept: FAMILY MEDICINE | Facility: CLINIC | Age: 64
End: 2024-05-08
Payer: COMMERCIAL

## 2024-05-08 ENCOUNTER — APPOINTMENT (OUTPATIENT)
Dept: LAB | Facility: CLINIC | Age: 64
End: 2024-05-08
Payer: COMMERCIAL

## 2024-05-08 VITALS
HEART RATE: 85 BPM | OXYGEN SATURATION: 97 % | SYSTOLIC BLOOD PRESSURE: 136 MMHG | WEIGHT: 176.4 LBS | DIASTOLIC BLOOD PRESSURE: 70 MMHG | BODY MASS INDEX: 36.53 KG/M2 | RESPIRATION RATE: 16 BRPM

## 2024-05-08 DIAGNOSIS — E11.9 TYPE 2 DIABETES MELLITUS WITHOUT COMPLICATION, WITHOUT LONG-TERM CURRENT USE OF INSULIN (H): ICD-10-CM

## 2024-05-08 DIAGNOSIS — I10 ESSENTIAL HYPERTENSION WITH GOAL BLOOD PRESSURE LESS THAN 140/90: Primary | ICD-10-CM

## 2024-05-08 DIAGNOSIS — R80.9 MICROALBUMINURIA: ICD-10-CM

## 2024-05-08 DIAGNOSIS — Z13.0 SCREENING FOR DEFICIENCY ANEMIA: ICD-10-CM

## 2024-05-08 LAB
BASOPHILS # BLD AUTO: 0 10E3/UL (ref 0–0.2)
BASOPHILS NFR BLD AUTO: 1 %
EOSINOPHIL # BLD AUTO: 0.3 10E3/UL (ref 0–0.7)
EOSINOPHIL NFR BLD AUTO: 6 %
ERYTHROCYTE [DISTWIDTH] IN BLOOD BY AUTOMATED COUNT: 13.2 % (ref 10–15)
HBA1C MFR BLD: 7.2 % (ref 0–5.6)
HCT VFR BLD AUTO: 41 % (ref 35–47)
HGB BLD-MCNC: 14.2 G/DL (ref 11.7–15.7)
IMM GRANULOCYTES # BLD: 0 10E3/UL
IMM GRANULOCYTES NFR BLD: 0 %
LYMPHOCYTES # BLD AUTO: 1.3 10E3/UL (ref 0.8–5.3)
LYMPHOCYTES NFR BLD AUTO: 31 %
MCH RBC QN AUTO: 27.7 PG (ref 26.5–33)
MCHC RBC AUTO-ENTMCNC: 34.6 G/DL (ref 31.5–36.5)
MCV RBC AUTO: 80 FL (ref 78–100)
MONOCYTES # BLD AUTO: 0.3 10E3/UL (ref 0–1.3)
MONOCYTES NFR BLD AUTO: 8 %
NEUTROPHILS # BLD AUTO: 2.3 10E3/UL (ref 1.6–8.3)
NEUTROPHILS NFR BLD AUTO: 55 %
PLATELET # BLD AUTO: 280 10E3/UL (ref 150–450)
RBC # BLD AUTO: 5.13 10E6/UL (ref 3.8–5.2)
WBC # BLD AUTO: 4.3 10E3/UL (ref 4–11)

## 2024-05-08 PROCEDURE — 85025 COMPLETE CBC W/AUTO DIFF WBC: CPT

## 2024-05-08 PROCEDURE — 36415 COLL VENOUS BLD VENIPUNCTURE: CPT

## 2024-05-08 PROCEDURE — 82570 ASSAY OF URINE CREATININE: CPT

## 2024-05-08 PROCEDURE — 82043 UR ALBUMIN QUANTITATIVE: CPT

## 2024-05-08 PROCEDURE — 80048 BASIC METABOLIC PNL TOTAL CA: CPT

## 2024-05-08 PROCEDURE — 83036 HEMOGLOBIN GLYCOSYLATED A1C: CPT

## 2024-05-08 PROCEDURE — 99207 PR NO CHARGE NURSE ONLY: CPT

## 2024-05-08 ASSESSMENT — PAIN SCALES - GENERAL: PAINLEVEL: NO PAIN (0)

## 2024-05-08 NOTE — PROGRESS NOTES
Kylah Ojeda is a 63 year old year old patient who comes in today for a Blood Pressure check because of ongoing blood pressure monitoring.    Vital Signs as repeated by RN - /70 (at goal)    Patient is taking medication as prescribed  Patient is tolerating medications well.  Patient is monitoring Blood Pressure at home.  Average readings if yes are - cuff seems to inflate and deflate multiple times but often gives no reading.  Current complaints: none    Disposition:  patient to continue with the same medication and patient reminded to call as needed. Labs in-process. 3 month follow up scheduled. Routing chart to PCP for review.      MARCIANO GonzalesN, RN (she/her)  [facility] RN

## 2024-05-09 LAB
ANION GAP SERPL CALCULATED.3IONS-SCNC: 12 MMOL/L (ref 7–15)
BUN SERPL-MCNC: 13.2 MG/DL (ref 8–23)
CALCIUM SERPL-MCNC: 9.6 MG/DL (ref 8.8–10.2)
CHLORIDE SERPL-SCNC: 100 MMOL/L (ref 98–107)
CREAT SERPL-MCNC: 0.66 MG/DL (ref 0.51–0.95)
CREAT UR-MCNC: 145 MG/DL
DEPRECATED HCO3 PLAS-SCNC: 27 MMOL/L (ref 22–29)
EGFRCR SERPLBLD CKD-EPI 2021: >90 ML/MIN/1.73M2
GLUCOSE SERPL-MCNC: 210 MG/DL (ref 70–99)
MICROALBUMIN UR-MCNC: 71.4 MG/L
MICROALBUMIN/CREAT UR: 49.24 MG/G CR (ref 0–25)
POTASSIUM SERPL-SCNC: 4.4 MMOL/L (ref 3.4–5.3)
SODIUM SERPL-SCNC: 139 MMOL/L (ref 135–145)

## 2024-05-17 ENCOUNTER — ALLIED HEALTH/NURSE VISIT (OUTPATIENT)
Dept: EDUCATION SERVICES | Facility: CLINIC | Age: 64
End: 2024-05-17
Attending: FAMILY MEDICINE
Payer: COMMERCIAL

## 2024-05-17 DIAGNOSIS — E11.9 TYPE 2 DIABETES MELLITUS WITHOUT COMPLICATION, WITHOUT LONG-TERM CURRENT USE OF INSULIN (H): Primary | ICD-10-CM

## 2024-05-17 DIAGNOSIS — R80.9 MICROALBUMINURIA: ICD-10-CM

## 2024-05-17 DIAGNOSIS — I10 ESSENTIAL HYPERTENSION WITH GOAL BLOOD PRESSURE LESS THAN 140/90: ICD-10-CM

## 2024-05-17 PROCEDURE — 97802 MEDICAL NUTRITION INDIV IN: CPT | Performed by: DIETITIAN, REGISTERED

## 2024-05-17 RX ORDER — LANCETS
EACH MISCELLANEOUS
Qty: 100 EACH | Refills: 6 | Status: SHIPPED | OUTPATIENT
Start: 2024-05-17

## 2024-05-17 NOTE — PROGRESS NOTES
Diabetes Self-Management Education & Support    Presents for: Individual review    Type of Service: In Person Visit      ASSESSMENT:  Kylah's A1C has gone up. Admits she has been eating too many treats and drinking sweetened coffee drinks. She has been working to reduce these and switching over to a plant based low-fat vegan diet. Provided further education on this diet including need to be low fat and plant based to really see results in health (diabetes/heart/weight/etc). She is motivated to do this and has a roommate who has been following this diet for years. Discussed adding B12 supplement. She is also working on her exercise routine. Lastly reviewed recommendations by ADA for SGLT2 with kidney disease, she will wait on this for now and see how labs are in a few months.     Patient's most recent   Lab Results   Component Value Date    A1C 7.2 05/08/2024     is not meeting goal of <7.0    Diabetes knowledge and skills assessment:   Patient is knowledgeable in diabetes management concepts related to: Healthy Eating, Being Active, and Reducing Risks    Continue education with the following diabetes management concepts: Healthy Eating, Being Active, Monitoring, Taking Medication, Problem Solving, and Reducing Risks    Based on learning assessment above, most appropriate setting for further diabetes education would be: Individual setting.      PLAN  -See AVS  Follow-up: PRN    See Care Plan for co-developed, patient-state behavior change goals.  AVS provided for patient today.    Education Materials Provided:  The Vegan Diet How to Guide      SUBJECTIVE/OBJECTIVE:  Presents for: Individual review  Accompanied by: Self  Diabetes education in the past 24mo: No  Diabetes type: Type 2  Date of diagnosis: 5 years ago  Disease course: Stable  How confident are you filling out medical forms by yourself:: Quite a bit  Diabetes management related comments/concerns: Need to know how to lose weight-  Cultural Influences/Ethnic  "Background:  Not  or       Diabetes Symptoms & Complications:  Diabetes Related Symptoms: None  Weight trend: Decreasing  Symptom course: Stable (more fatigued last few months)  Disease course: Stable       Patient Problem List and Family Medical History reviewed for relevant medical history, current medical status, and diabetes risk factors.    Vitals:  LMP  (LMP Unknown)   Estimated body mass index is 36.53 kg/m  as calculated from the following:    Height as of 4/15/24: 1.48 m (4' 10.27\").    Weight as of 5/8/24: 80 kg (176 lb 6.4 oz).   Last 3 BP:   BP Readings from Last 3 Encounters:   05/08/24 136/70   04/15/24 (!) 146/85   01/08/24 (!) 140/68       History   Smoking Status    Never   Smokeless Tobacco    Never       Labs:  Lab Results   Component Value Date    A1C 7.2 05/08/2024     Lab Results   Component Value Date     05/08/2024    GLC 99 06/14/2023     Lab Results   Component Value Date     08/08/2023     Direct Measure HDL   Date Value Ref Range Status   08/08/2023 40 (L) >=50 mg/dL Final   ]  GFR Estimate   Date Value Ref Range Status   05/08/2024 >90 >60 mL/min/1.73m2 Final     No results found for: \"GFRESTBLACK\"  Lab Results   Component Value Date    CR 0.66 05/08/2024     No results found for: \"MICROALBUMIN\"    Healthy Eating:  Healthy Eating Assessed Today: Yes  How many times a week on average do you eat food made away from home (restaurant/take-out)?: 0  Meals include: Breakfast, Lunch  Breakfast: frozen fruit with oatmilk (zero sugar chobani) OR brown rice + onions/carrots + 1tbsp oil OR peanut butter and jelly sandwich with 1/2 cup orange.  Lunch: raw vegetable salads OR baked potatoes + margarine + tofu makes beef crumbles  Dinner: fruit bars (whole fruit) smoothies.  Snacks: peanut butter  Other: beverages-frappuchino cutting out.  Beverages: Water, Juice, Other (see Comments)  Please elaborate:: Fruit smoothies made with oat milk.    Being Active:  Being Active " Assessed Today: Yes  Exercise:: Currently not exercising (started again this week)  Barrier to exercise: None    Monitoring:  Monitoring Assessed Today: Yes  Did patient bring glucose meter to appointment? : No needs a new meter  Times checking blood sugar at home (number): Never  Times checking blood sugar at home (per): Month      Taking Medications:  Taking Medication Assessed Today: Yes  Current Treatments: None, Diet    Problem Solving:  Problem Solving Assessed Today: No      Reducing Risks:  Reducing Risks Assessed Today: Yes  Diabetes Risks: Age over 45 years, Hypertriglyceridemia, Sedentary Lifestyle, Hyperlipidemia, Family History  CAD Risks: Diabetes Mellitus, Dyslipidemia, Family history, Hypertension, Obesity, Sedentary lifestyle, Post-menopausal  Has dilated eye exam at least once a year?: Yes  Sees dentist every 6 months?: No  Feet checked by healthcare provider in the last year?: No    Healthy Coping:  Healthy Coping Assessed Today: Yes  Emotional response to diabetes: Ready to learn  Informal Support system:: Friends  Stage of change: ACTION (Actively working towards change)  Patient Activation Measure Survey Score:       No data to display                  Care Plan and Education Provided:  Healthy Eating: vegan low fat/whole foods diet, Being Active: Amount recommended (150 minutes moderate or 75 minutes vigorous activity and 2-3 days strength training per week), Monitoring: Individual glucose targets, Taking Medication: SGLT2 info, and Reducing Risks: Goal for A1c, how it relates to glucose and how often to check and Preventing cardiovascular disease, including blood pressure goals, lipid goals, recommendations for cardioprotective medications, statins, and aspirin    Annie Schafer RD, CHELSEY, [facility]      Time Spent: 45 minutes  Encounter Type: Individual    Any diabetes medication dose changes were made via the CDE Protocol per the patient's primary care provider. A copy of this encounter was shared  with the provider.

## 2024-05-17 NOTE — PATIENT INSTRUCTIONS
Goals for Diabetes:    1. Check blood sugars at varying times: before meals, after meals and bedtime  Blood Glucose Targets:  -Fasting and before meal target is 80 - 130  -2 hours after a meal target is < 180    2.  Activity really helps improve blood sugars. Try to Incorporate 30 minutes activity into each day - does not need to be all at one time & walking counts!    -Weight train 3x per week + cardio (after meals will help lower BG after eating)    3. Vegan diet low fat. Start taking B12 supplement    4. Take diabetes medications as prescribed   -Could consider SGLT-2 medication for kidneys and cardiovascular disease    5. Have cholesterol labs rechecked at next visit.    6. Send message to PCP about fatigue.    Follow up with your Diabetic Educator as needed to assess BG targets and need for modifications to medications and/or lifestyle.    Call or Mychart with any questions.      Thank you!  Annie Schafer RD, CHELESY, [facility]  Certified Diabetes Care &   Outpatient [facility] Diabetes Education and Nutrition Services for the [facility]:  For Your Diabetes Education or Nutrition Appointments Call:  [phone]   For Diabetes Education and Nutrition Related Questions:   Phone: [phone]  Send MyChart Message   If you need a medication refill please contact your pharmacy. Please allow 3 business days for your refills to be completed.

## 2024-05-17 NOTE — LETTER
5/17/2024         RE: Kylah Ojeda  [address]  [place] [address]        Dear Colleague,    Thank you for referring your patient, Kylah Ojeda, to the [facility]. Please see a copy of my visit note below.    Diabetes Self-Management Education & Support    Presents for: Individual review    Type of Service: In Person Visit      ASSESSMENT:  Kylah's A1C has gone up. Admits she has been eating too many treats and drinking sweetened coffee drinks. She has been working to reduce these and switching over to a plant based low-fat vegan diet. Provided further education on this diet including need to be low fat and plant based to really see results in health (diabetes/heart/weight/etc). She is motivated to do this and has a roommate who has been following this diet for years. Discussed adding B12 supplement. She is also working on her exercise routine. Lastly reviewed recommendations by ADA for SGLT2 with kidney disease, she will wait on this for now and see how labs are in a few months.     Patient's most recent   Lab Results   Component Value Date    A1C 7.2 05/08/2024     is not meeting goal of <7.0    Diabetes knowledge and skills assessment:   Patient is knowledgeable in diabetes management concepts related to: Healthy Eating, Being Active, and Reducing Risks    Continue education with the following diabetes management concepts: Healthy Eating, Being Active, Monitoring, Taking Medication, Problem Solving, and Reducing Risks    Based on learning assessment above, most appropriate setting for further diabetes education would be: Individual setting.      PLAN  -See AVS  Follow-up: PRN    See Care Plan for co-developed, patient-state behavior change goals.  AVS provided for patient today.    Education Materials Provided:  The Vegan Diet How to Guide      SUBJECTIVE/OBJECTIVE:  Presents for: Individual review  Accompanied by: Self  Diabetes education in the past 24mo:  "No  Diabetes type: Type 2  Date of diagnosis: 5 years ago  Disease course: Stable  How confident are you filling out medical forms by yourself:: Quite a bit  Diabetes management related comments/concerns: Need to know how to lose weight-  Cultural Influences/Ethnic Background:  Not  or       Diabetes Symptoms & Complications:  Diabetes Related Symptoms: None  Weight trend: Decreasing  Symptom course: Stable (more fatigued last few months)  Disease course: Stable       Patient Problem List and Family Medical History reviewed for relevant medical history, current medical status, and diabetes risk factors.    Vitals:  LMP  (LMP Unknown)   Estimated body mass index is 36.53 kg/m  as calculated from the following:    Height as of 4/15/24: 1.48 m (4' 10.27\").    Weight as of 5/8/24: 80 kg (176 lb 6.4 oz).   Last 3 BP:   BP Readings from Last 3 Encounters:   05/08/24 136/70   04/15/24 (!) 146/85   01/08/24 (!) 140/68       History   Smoking Status     Never   Smokeless Tobacco     Never       Labs:  Lab Results   Component Value Date    A1C 7.2 05/08/2024     Lab Results   Component Value Date     05/08/2024    GLC 99 06/14/2023     Lab Results   Component Value Date     08/08/2023     Direct Measure HDL   Date Value Ref Range Status   08/08/2023 40 (L) >=50 mg/dL Final   ]  GFR Estimate   Date Value Ref Range Status   05/08/2024 >90 >60 mL/min/1.73m2 Final     No results found for: \"GFRESTBLACK\"  Lab Results   Component Value Date    CR 0.66 05/08/2024     No results found for: \"MICROALBUMIN\"    Healthy Eating:  Healthy Eating Assessed Today: Yes  How many times a week on average do you eat food made away from home (restaurant/take-out)?: 0  Meals include: Breakfast, Lunch  Breakfast: frozen fruit with oatmilk (zero sugar chobani) OR brown rice + onions/carrots + 1tbsp oil OR peanut butter and jelly sandwich with 1/2 cup orange.  Lunch: raw vegetable salads OR baked potatoes + margarine + tofu " makes beef crumbles  Dinner: fruit bars (whole fruit) smoothies.  Snacks: peanut butter  Other: beverages-frappuchino cutting out.  Beverages: Water, Juice, Other (see Comments)  Please elaborate:: Fruit smoothies made with oat milk.    Being Active:  Being Active Assessed Today: Yes  Exercise:: Currently not exercising (started again this week)  Barrier to exercise: None    Monitoring:  Monitoring Assessed Today: Yes  Did patient bring glucose meter to appointment? : No needs a new meter  Times checking blood sugar at home (number): Never  Times checking blood sugar at home (per): Month      Taking Medications:  Taking Medication Assessed Today: Yes  Current Treatments: None, Diet    Problem Solving:  Problem Solving Assessed Today: No      Reducing Risks:  Reducing Risks Assessed Today: Yes  Diabetes Risks: Age over 45 years, Hypertriglyceridemia, Sedentary Lifestyle, Hyperlipidemia, Family History  CAD Risks: Diabetes Mellitus, Dyslipidemia, Family history, Hypertension, Obesity, Sedentary lifestyle, Post-menopausal  Has dilated eye exam at least once a year?: Yes  Sees dentist every 6 months?: No  Feet checked by healthcare provider in the last year?: No    Healthy Coping:  Healthy Coping Assessed Today: Yes  Emotional response to diabetes: Ready to learn  Informal Support system:: Friends  Stage of change: ACTION (Actively working towards change)  Patient Activation Measure Survey Score:       No data to display                  Care Plan and Education Provided:  Healthy Eating: vegan low fat/whole foods diet, Being Active: Amount recommended (150 minutes moderate or 75 minutes vigorous activity and 2-3 days strength training per week), Monitoring: Individual glucose targets, Taking Medication: SGLT2 info, and Reducing Risks: Goal for A1c, how it relates to glucose and how often to check and Preventing cardiovascular disease, including blood pressure goals, lipid goals, recommendations for cardioprotective  medications, statins, and aspirin    Annie Schafer RD, CHELSEY, [facility]      Time Spent: 45 minutes  Encounter Type: Individual    Any diabetes medication dose changes were made via the CDE Protocol per the patient's primary care provider. A copy of this encounter was shared with the provider.

## 2024-07-09 ENCOUNTER — PATIENT OUTREACH (OUTPATIENT)
Dept: CARE COORDINATION | Facility: CLINIC | Age: 64
End: 2024-07-09
Payer: COMMERCIAL

## 2024-07-12 ENCOUNTER — ONCOLOGY VISIT (OUTPATIENT)
Dept: ONCOLOGY | Facility: CLINIC | Age: 64
End: 2024-07-12
Attending: NURSE PRACTITIONER
Payer: COMMERCIAL

## 2024-07-12 VITALS
BODY MASS INDEX: 37.07 KG/M2 | HEART RATE: 87 BPM | RESPIRATION RATE: 16 BRPM | SYSTOLIC BLOOD PRESSURE: 139 MMHG | OXYGEN SATURATION: 97 % | TEMPERATURE: 97.9 F | WEIGHT: 179 LBS | DIASTOLIC BLOOD PRESSURE: 80 MMHG

## 2024-07-12 DIAGNOSIS — Z85.42 ENCOUNTER FOR FOLLOW-UP SURVEILLANCE OF ENDOMETRIAL CANCER: Primary | ICD-10-CM

## 2024-07-12 DIAGNOSIS — Z08 ENCOUNTER FOR FOLLOW-UP SURVEILLANCE OF ENDOMETRIAL CANCER: Primary | ICD-10-CM

## 2024-07-12 PROCEDURE — 99213 OFFICE O/P EST LOW 20 MIN: CPT | Performed by: NURSE PRACTITIONER

## 2024-07-12 PROCEDURE — G0463 HOSPITAL OUTPT CLINIC VISIT: HCPCS | Performed by: NURSE PRACTITIONER

## 2024-07-12 ASSESSMENT — PAIN SCALES - GENERAL: PAINLEVEL: NO PAIN (0)

## 2024-07-12 NOTE — PROGRESS NOTES
"Oncology Rooming Note    July 12, 2024 12:27 PM   Kylah Ojeda is a 63 year old female who presents for:    Chief Complaint   Patient presents with    Oncology Clinic Visit     Endometrial cancer (H)       Initial Vitals: /80 (BP Location: Left arm, Patient Position: Sitting, Cuff Size: Adult Large)   Pulse 87   Temp 97.9  F (36.6  C) (Oral)   Resp 16   Wt 81.2 kg (179 lb)   LMP  (LMP Unknown)   SpO2 97%   BMI 37.07 kg/m   Estimated body mass index is 37.07 kg/m  as calculated from the following:    Height as of 4/15/24: 1.48 m (4' 10.27\").    Weight as of this encounter: 81.2 kg (179 lb). Body surface area is 1.83 meters squared.  No Pain (0) Comment: Data Unavailable   No LMP recorded (lmp unknown). Patient has had a hysterectomy.  Allergies reviewed: Yes  Medications reviewed: Yes    Medications: Medication refills not needed today.  Pharmacy name entered into [facility]: [facility] - [place], [place] - [address] [place] [address]    Frailty Screening:   Is the patient here for a new oncology consult visit in cancer care? 2. No      Clinical concerns: no     Louisa Babin CMA              "

## 2024-07-12 NOTE — PROGRESS NOTES
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 Denies unintentional weight loss, soaking night sweats, trouble breathing, abdominal bloating/fullness, persistent abdominal or pelvic pain, vaginal bleeding, new bowel or bladder concerns, swollen nodes, focal bony pain, or swelling. Denies pelvic/vaginal/sexual concerns.    Established with PCP, up to date on mammogram and CRC screening. Exercising. No smoking, rare alcohol use.      ONCOLOGY HISTORY:  6/10/2023-2023: Admission to [facility] for hemorrhagic shock and uterine mass. Had XLap/JAVIER/BSO/PPLND. Final pathology stage 1A grade 2 endometrial adenocarcinoma. NSMP. MMR proficient. Non myoinvasive. No LVSI.           OBJECTIVE:    PHYSICAL EXAM:  /80 (BP Location: Left arm, Patient Position: Sitting, Cuff Size: Adult Large)   Pulse 87   Temp 97.9  F (36.6  C) (Oral)   Resp 16   Wt 81.2 kg (179 lb)   LMP  (LMP Unknown)   SpO2 97%   BMI 37.07 kg/m       CONSTITUTIONAL: Alert non-toxic appearing female in no acute distress  RESPIRATORY: Respiratory effort unlabored  CV/PV: Bilateral lower extremities with trace pedal edema bilaterally  GASTROINTESTINAL: Abdomen soft, non-distended, and non-tender to palpation without masses or organomegaly  GENITOURINARY: External genitalia and urethral meatus pink without lesions, masses, or excoriation. Vagina pink and smooth without masses or lesions. Cervix surgically absent. Vaginal cuff without masses or lesions. Bimanual exam reveals no masses or fullness. Rectovaginal exam confirms these findings.  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  Continue surveillance every 6 months x2 years (through 7/2025) then annually thereafter with pelvic exam.   No further imaging unless clinically indicated. No routine labs. Pap no longer indicated.  Return to clinic in 6 month(s) with Amanda Boyer CNP for surveillance.   Reviewed signs and symptoms of recurrent disease and when to seek further care.    Treatment/disease related effects:  None    Genetics:   Genetic counseling referral not indicated at this time- mismatch repair proteins intact, making Perdomo syndrome unlikely.    Health maintenance:   Follow up with PCP for routine cancer screenings, non-gynecologic concerns, and co-morbid conditions    Patient verbalized understanding of and agreement with plan- see AVS for patient instructions      25 minutes spent on date of service on visit, including chart review, face to face visit, documentation, and coordination of care.    IRENE Melendez, CNP  Division of Gynecologic Oncology

## 2024-07-12 NOTE — PATIENT INSTRUCTIONS
Your visit today was with Amanda Boyer CNP for surveillance.    Plan:  No evidence of disease on today's exam  Return to clinic in 6 month(s) with Amanda Boyer CNP for surveillance    SIGNS AND SYMPTOMS OF RECURRENT DISEASE    Symptoms of cancer coming back can vary from person to person and it's important to know your own baseline and health factors that may cause symptoms. The following list includes symptoms that would warrant further investigation with your oncology team.    Vaginal bleeding or spotting  Persistent pelvic, abdominal, or bone pain that is persistent and does not improve over time  New persistent dry cough or shortness of breath at rest without a known cause  Unintentional weight loss  New swelling in the legs  New changes in bowel/bladder patterns  New, persistent bloating/fullness  New, persistent onset of drenching night sweats  New, persistent lumps or bumps in the groin or neck  New severe headaches, tunnel vision, double vision, or seizures  Sudden and persistent fatigue without a known cause, particularly if associated with any of the above symptoms    If you have symptoms that are new, persistent, or concerning to you and have been ongoing for two weeks or longer, please contact your oncology care team.    [facility] triage [phone]    If you have difficulty reaching triage during off hours, you can call [phone] and ask to speak to the gynecologic oncology resident on call      For urgent questions or concerns, please call rather than send a medical message.

## 2024-07-12 NOTE — LETTER
2024      Kylah Ojeda  [address]  [facility] [address]      Dear Colleague,    Thank you for referring your patient, Kylah Ojeda, to the [facility]. Please see a copy of my visit note below.    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 Denies unintentional weight loss, soaking night sweats, trouble breathing, abdominal bloating/fullness, persistent abdominal or pelvic pain, vaginal bleeding, new bowel or bladder concerns, swollen nodes, focal bony pain, or swelling. Denies pelvic/vaginal/sexual concerns.    Established with PCP, up to date on mammogram and CRC screening. Exercising. No smoking, rare alcohol use.      ONCOLOGY HISTORY:  6/10/2023-2023: Admission to [facility] for hemorrhagic shock and uterine mass. Had XLap/JAVIER/BSO/PPLND. Final pathology stage 1A grade 2 endometrial adenocarcinoma. NSMP. MMR proficient. Non myoinvasive. No LVSI.           OBJECTIVE:    PHYSICAL EXAM:  /80 (BP Location: Left arm, Patient Position: Sitting, Cuff Size: Adult Large)   Pulse 87   Temp 97.9  F (36.6  C) (Oral)   Resp 16   Wt 81.2 kg (179 lb)   LMP  (LMP Unknown)   SpO2 97%   BMI 37.07 kg/m       CONSTITUTIONAL: Alert non-toxic appearing female in no acute distress  RESPIRATORY: Respiratory effort unlabored  CV/PV: Bilateral lower extremities with trace pedal edema bilaterally  GASTROINTESTINAL: Abdomen soft, non-distended, and non-tender to palpation without masses or organomegaly  GENITOURINARY: External genitalia and urethral meatus pink without lesions, masses, or excoriation. Vagina pink and smooth without masses or lesions. Cervix surgically absent. Vaginal cuff without masses or lesions.  Bimanual exam reveals no masses or fullness. Rectovaginal exam confirms these findings.  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  Continue surveillance every 6 months x2 years (through 7/2025) then annually thereafter with pelvic exam.   No further imaging unless clinically indicated. No routine labs. Pap no longer indicated.  Return to clinic in 6 month(s) with Amanda Boyer CNP for surveillance.   Reviewed signs and symptoms of recurrent disease and when to seek further care.    Treatment/disease related effects:  None    Genetics:   Genetic counseling referral not indicated at this time- mismatch repair proteins intact, making Perdomo syndrome unlikely.    Health maintenance:   Follow up with PCP for routine cancer screenings, non-gynecologic concerns, and co-morbid conditions    Patient verbalized understanding of and agreement with plan- see AVS for patient instructions      25 minutes spent on date of service on visit, including chart review, face to face visit, documentation, and coordination of care.    IRENE Melendez CNP  Division of Gynecologic Oncology        Oncology Rooming Note    July 12, 2024 12:27 PM   Kylah Ojeda is a 63 year old female who presents for:    Chief Complaint   Patient presents with     Oncology Clinic Visit     Endometrial cancer (H)       Initial Vitals: /80 (BP Location: Left arm, Patient Position: Sitting, Cuff Size: Adult Large)   Pulse 87   Temp 97.9  F (36.6  C) (Oral)   Resp 16   Wt 81.2 kg (179 lb)   " LMP  (LMP Unknown)   SpO2 97%   BMI 37.07 kg/m   Estimated body mass index is 37.07 kg/m  as calculated from the following:    Height as of 4/15/24: 1.48 m (4' 10.27\").    Weight as of this encounter: 81.2 kg (179 lb). Body surface area is 1.83 meters squared.  No Pain (0) Comment: Data Unavailable   No LMP recorded (lmp unknown). Patient has had a hysterectomy.  Allergies reviewed: Yes  Medications reviewed: Yes    Medications: Medication refills not needed today.  Pharmacy name entered into [facility]: [facility] - [place], [place] - [address]    Frailty Screening:   Is the patient here for a new oncology consult visit in cancer care? 2. No      Clinical concerns: no     Louisa Babin CMA                Again, thank you for allowing me to participate in the care of your patient.        Sincerely,        IRENE Melendez CNP  "

## 2024-07-23 ENCOUNTER — PATIENT OUTREACH (OUTPATIENT)
Dept: CARE COORDINATION | Facility: CLINIC | Age: 64
End: 2024-07-23
Payer: COMMERCIAL

## 2024-08-21 ENCOUNTER — OFFICE VISIT (OUTPATIENT)
Dept: FAMILY MEDICINE | Facility: CLINIC | Age: 64
End: 2024-08-21
Payer: COMMERCIAL

## 2024-08-21 VITALS
BODY MASS INDEX: 36.79 KG/M2 | DIASTOLIC BLOOD PRESSURE: 66 MMHG | OXYGEN SATURATION: 98 % | HEART RATE: 84 BPM | RESPIRATION RATE: 20 BRPM | SYSTOLIC BLOOD PRESSURE: 126 MMHG | HEIGHT: 58 IN | WEIGHT: 175.3 LBS | TEMPERATURE: 97.5 F

## 2024-08-21 DIAGNOSIS — I10 ESSENTIAL HYPERTENSION WITH GOAL BLOOD PRESSURE LESS THAN 140/90: ICD-10-CM

## 2024-08-21 DIAGNOSIS — E11.29 TYPE 2 DIABETES MELLITUS WITH DIABETIC MICROALBUMINURIA, WITHOUT LONG-TERM CURRENT USE OF INSULIN (H): Primary | ICD-10-CM

## 2024-08-21 DIAGNOSIS — E66.01 CLASS 2 SEVERE OBESITY DUE TO EXCESS CALORIES WITH SERIOUS COMORBIDITY AND BODY MASS INDEX (BMI) OF 36.0 TO 36.9 IN ADULT (H): ICD-10-CM

## 2024-08-21 DIAGNOSIS — C54.1 ENDOMETRIAL CANCER (H): ICD-10-CM

## 2024-08-21 DIAGNOSIS — E66.812 CLASS 2 SEVERE OBESITY DUE TO EXCESS CALORIES WITH SERIOUS COMORBIDITY AND BODY MASS INDEX (BMI) OF 36.0 TO 36.9 IN ADULT (H): ICD-10-CM

## 2024-08-21 DIAGNOSIS — Z82.49 FAMILY HISTORY OF ISCHEMIC HEART DISEASE: ICD-10-CM

## 2024-08-21 DIAGNOSIS — Z23 ENCOUNTER FOR VACCINATION: ICD-10-CM

## 2024-08-21 DIAGNOSIS — R80.9 TYPE 2 DIABETES MELLITUS WITH DIABETIC MICROALBUMINURIA, WITHOUT LONG-TERM CURRENT USE OF INSULIN (H): Primary | ICD-10-CM

## 2024-08-21 DIAGNOSIS — R93.1 AGATSTON CAC SCORE 200-399: ICD-10-CM

## 2024-08-21 DIAGNOSIS — E78.5 HYPERLIPIDEMIA LDL GOAL <100: ICD-10-CM

## 2024-08-21 LAB
CHOLEST SERPL-MCNC: 217 MG/DL
FASTING STATUS PATIENT QL REPORTED: NO
HBA1C MFR BLD: 6.5 % (ref 0–5.6)
HDLC SERPL-MCNC: 38 MG/DL
LDLC SERPL CALC-MCNC: 130 MG/DL
NONHDLC SERPL-MCNC: 179 MG/DL
TRIGL SERPL-MCNC: 246 MG/DL

## 2024-08-21 PROCEDURE — 80061 LIPID PANEL: CPT | Performed by: FAMILY MEDICINE

## 2024-08-21 PROCEDURE — 99214 OFFICE O/P EST MOD 30 MIN: CPT | Performed by: FAMILY MEDICINE

## 2024-08-21 PROCEDURE — 99207 PR FOOT EXAM NO CHARGE: CPT | Performed by: FAMILY MEDICINE

## 2024-08-21 PROCEDURE — 36415 COLL VENOUS BLD VENIPUNCTURE: CPT | Performed by: FAMILY MEDICINE

## 2024-08-21 PROCEDURE — G2211 COMPLEX E/M VISIT ADD ON: HCPCS | Performed by: FAMILY MEDICINE

## 2024-08-21 PROCEDURE — 83036 HEMOGLOBIN GLYCOSYLATED A1C: CPT | Performed by: FAMILY MEDICINE

## 2024-08-21 RX ORDER — ASPIRIN 81 MG/1
81 TABLET ORAL DAILY
Qty: 90 TABLET | Refills: 3 | Status: SHIPPED | OUTPATIENT
Start: 2024-08-21

## 2024-08-21 RX ORDER — ROSUVASTATIN CALCIUM 5 MG/1
5 TABLET, COATED ORAL DAILY
Qty: 90 TABLET | Refills: 3 | Status: SHIPPED | OUTPATIENT
Start: 2024-08-21

## 2024-08-21 ASSESSMENT — PAIN SCALES - GENERAL: PAINLEVEL: NO PAIN (0)

## 2024-08-21 NOTE — PROGRESS NOTES
Assessment & Plan     Type 2 diabetes mellitus with diabetic microalbuminuria, without long-term current use of insulin (H)  -A1c 6.5, well controlled without medication. Encouraged patient to continue to work on dietary and lifestyle changes to keep diabetes under control.  -follow-up in 3 months as scheduled for monitoring.  - Hemoglobin A1c  - IN FOOT EXAM NO CHARGE    Hyperlipidemia LDL goal <100  Agatston CAC score 200-399  Family history of ischemic heart disease  -Discussed CT coronary ca score of 250 and recommendations to start statin and asa. Pt now amendable to starting medication given added evidence that she has plaque burden. Continue with dietary and lifestyle changes. Patient also interested in meeting with cardiology for further risk stratification and education, referral placed.  -Plan to recheck lipids next visit on statin.   - Lipid panel reflex to direct LDL Non-fasting  - rosuvastatin (CRESTOR) 5 MG tablet  Dispense: 90 tablet; Refill: 3  - aspirin 81 MG EC tablet  Dispense: 90 tablet; Refill: 3  - Adult Cardiology Eval  Referral    Essential hypertension with goal blood pressure less than 140/90  -Well controlled today  -Continue amlodipine and losartan    Class 2 severe obesity due to excess calories with serious comorbidity and body mass index (BMI) of 36.0 to 36.9 in adult (H)  -Discussed dietary and lifestyle modifications    Endometrial cancer (H)  -Stable, follows with oncology    Encounter for vaccination  -Pt declined Shingles and RSV vaccines today. She will return another time to receive them.      The longitudinal plan of care for the diagnosis(es)/condition(s) as documented were addressed during this visit. Due to the added complexity in care, I will continue to support Kylah in the subsequent management and with ongoing continuity of care.      BMI  Estimated body mass index is 36.8 kg/m  as calculated from the following:    Height as of this encounter: 1.47 m (4'  "9.87\").    Weight as of this encounter: 79.5 kg (175 lb 4.8 oz).             Nahed Montero is a 63 year old, presenting for the following health issues:  Diabetes and Hypertension        8/21/2024    12:49 PM   Additional Questions   Roomed by Karen MONET     History of Present Illness       Hyperlipidemia:  She presents for follow up of hyperlipidemia.   She is not taking medication to lower cholesterol. She is not having myalgia or other side effects to statin medications.    Hypertension: She presents for follow up of hypertension.  She does not check blood pressure  regularly outside of the clinic. Outside blood pressures have been over 140/90. She follows a low salt diet.     She eats 2-3 servings of fruits and vegetables daily.She consumes 0 sweetened beverage(s) daily.   She is taking medications regularly.     Here today to follow-up on several chronic conditions.    DM2: A1c 6.5 today, improved from last check. Has been working on diet and lifestyle, but endorses she has not been trying very hard.    HLD/CAD: Had CT coronary ca scan with score of 250.     HTN: Adhering to taking amlodipine and losartan.                  Objective    /66   Pulse 84   Temp 97.5  F (36.4  C) (Temporal)   Resp 20   Ht 1.47 m (4' 9.87\")   Wt 79.5 kg (175 lb 4.8 oz)   LMP  (LMP Unknown)   SpO2 98%   BMI 36.80 kg/m    Body mass index is 36.8 kg/m .  Physical Exam   GENERAL: alert and no distress  PSYCH: mentation appears normal, affect normal/bright  Diabetic foot exam: normal DP and PT pulses, no trophic changes or ulcerative lesions, normal sensory exam, and dry cracking heels            Signed Electronically by: Malcolm Yu DO    "

## 2024-08-22 PROBLEM — R93.1 AGATSTON CAC SCORE 200-399: Status: ACTIVE | Noted: 2024-08-22

## 2024-09-10 ENCOUNTER — PATIENT OUTREACH (OUTPATIENT)
Dept: CARE COORDINATION | Facility: CLINIC | Age: 64
End: 2024-09-10
Payer: COMMERCIAL

## 2024-10-07 ENCOUNTER — MYC MEDICAL ADVICE (OUTPATIENT)
Dept: FAMILY MEDICINE | Facility: CLINIC | Age: 64
End: 2024-10-07
Payer: COMMERCIAL

## 2024-10-07 DIAGNOSIS — I10 ESSENTIAL HYPERTENSION WITH GOAL BLOOD PRESSURE LESS THAN 140/90: ICD-10-CM

## 2024-10-07 DIAGNOSIS — R80.9 MICROALBUMINURIA: ICD-10-CM

## 2024-10-07 DIAGNOSIS — E11.9 TYPE 2 DIABETES MELLITUS WITHOUT COMPLICATION, WITHOUT LONG-TERM CURRENT USE OF INSULIN (H): ICD-10-CM

## 2024-10-07 RX ORDER — AMLODIPINE BESYLATE 10 MG/1
10 TABLET ORAL DAILY
Qty: 90 TABLET | Refills: 2 | Status: SHIPPED | OUTPATIENT
Start: 2024-10-07

## 2024-10-07 RX ORDER — LOSARTAN POTASSIUM 25 MG/1
25 TABLET ORAL DAILY
Qty: 90 TABLET | Refills: 1 | Status: SHIPPED | OUTPATIENT
Start: 2024-10-07

## 2024-11-10 ENCOUNTER — HEALTH MAINTENANCE LETTER (OUTPATIENT)
Age: 64
End: 2024-11-10

## 2024-11-19 ENCOUNTER — OFFICE VISIT (OUTPATIENT)
Dept: FAMILY MEDICINE | Facility: CLINIC | Age: 64
End: 2024-11-19
Payer: COMMERCIAL

## 2024-11-19 VITALS
SYSTOLIC BLOOD PRESSURE: 142 MMHG | HEIGHT: 58 IN | BODY MASS INDEX: 37.05 KG/M2 | TEMPERATURE: 97.8 F | HEART RATE: 85 BPM | OXYGEN SATURATION: 95 % | WEIGHT: 176.5 LBS | DIASTOLIC BLOOD PRESSURE: 80 MMHG

## 2024-11-19 DIAGNOSIS — E11.9 TYPE 2 DIABETES MELLITUS WITHOUT COMPLICATION, WITHOUT LONG-TERM CURRENT USE OF INSULIN (H): Primary | ICD-10-CM

## 2024-11-19 DIAGNOSIS — E66.812 CLASS 2 SEVERE OBESITY WITH SERIOUS COMORBIDITY AND BODY MASS INDEX (BMI) OF 37.0 TO 37.9 IN ADULT, UNSPECIFIED OBESITY TYPE (H): ICD-10-CM

## 2024-11-19 DIAGNOSIS — R53.83 OTHER FATIGUE: ICD-10-CM

## 2024-11-19 DIAGNOSIS — R93.1 AGATSTON CAC SCORE 200-399: ICD-10-CM

## 2024-11-19 DIAGNOSIS — I10 ESSENTIAL HYPERTENSION WITH GOAL BLOOD PRESSURE LESS THAN 140/90: ICD-10-CM

## 2024-11-19 DIAGNOSIS — E78.5 HYPERLIPIDEMIA LDL GOAL <100: ICD-10-CM

## 2024-11-19 DIAGNOSIS — E66.01 CLASS 2 SEVERE OBESITY WITH SERIOUS COMORBIDITY AND BODY MASS INDEX (BMI) OF 37.0 TO 37.9 IN ADULT, UNSPECIFIED OBESITY TYPE (H): ICD-10-CM

## 2024-11-19 DIAGNOSIS — Z23 ENCOUNTER FOR VACCINATION: ICD-10-CM

## 2024-11-19 DIAGNOSIS — E55.9 VITAMIN D DEFICIENCY: ICD-10-CM

## 2024-11-19 LAB
ERYTHROCYTE [DISTWIDTH] IN BLOOD BY AUTOMATED COUNT: 13 % (ref 10–15)
EST. AVERAGE GLUCOSE BLD GHB EST-MCNC: 146 MG/DL
HBA1C MFR BLD: 6.7 % (ref 0–5.6)
HCT VFR BLD AUTO: 44 % (ref 35–47)
HGB BLD-MCNC: 15.1 G/DL (ref 11.7–15.7)
MCH RBC QN AUTO: 27.6 PG (ref 26.5–33)
MCHC RBC AUTO-ENTMCNC: 34.3 G/DL (ref 31.5–36.5)
MCV RBC AUTO: 80 FL (ref 78–100)
PLATELET # BLD AUTO: 316 10E3/UL (ref 150–450)
RBC # BLD AUTO: 5.48 10E6/UL (ref 3.8–5.2)
WBC # BLD AUTO: 7.1 10E3/UL (ref 4–11)

## 2024-11-19 PROCEDURE — 90472 IMMUNIZATION ADMIN EACH ADD: CPT | Performed by: FAMILY MEDICINE

## 2024-11-19 PROCEDURE — 85027 COMPLETE CBC AUTOMATED: CPT | Performed by: FAMILY MEDICINE

## 2024-11-19 PROCEDURE — 90673 RIV3 VACCINE NO PRESERV IM: CPT | Performed by: FAMILY MEDICINE

## 2024-11-19 PROCEDURE — 83036 HEMOGLOBIN GLYCOSYLATED A1C: CPT | Performed by: FAMILY MEDICINE

## 2024-11-19 PROCEDURE — 82728 ASSAY OF FERRITIN: CPT | Performed by: FAMILY MEDICINE

## 2024-11-19 PROCEDURE — 80061 LIPID PANEL: CPT | Performed by: FAMILY MEDICINE

## 2024-11-19 PROCEDURE — 90471 IMMUNIZATION ADMIN: CPT | Performed by: FAMILY MEDICINE

## 2024-11-19 PROCEDURE — 83540 ASSAY OF IRON: CPT | Performed by: FAMILY MEDICINE

## 2024-11-19 PROCEDURE — 83550 IRON BINDING TEST: CPT | Performed by: FAMILY MEDICINE

## 2024-11-19 PROCEDURE — 36415 COLL VENOUS BLD VENIPUNCTURE: CPT | Performed by: FAMILY MEDICINE

## 2024-11-19 PROCEDURE — 82607 VITAMIN B-12: CPT | Performed by: FAMILY MEDICINE

## 2024-11-19 PROCEDURE — 82306 VITAMIN D 25 HYDROXY: CPT | Performed by: FAMILY MEDICINE

## 2024-11-19 PROCEDURE — 90678 RSV VACC PREF BIVALENT IM: CPT | Performed by: FAMILY MEDICINE

## 2024-11-19 PROCEDURE — 99214 OFFICE O/P EST MOD 30 MIN: CPT | Mod: 25 | Performed by: FAMILY MEDICINE

## 2024-11-19 ASSESSMENT — PAIN SCALES - GENERAL: PAINLEVEL_OUTOF10: NO PAIN (0)

## 2024-11-19 NOTE — PATIENT INSTRUCTIONS
Call [phone] to schedule your annual diabetic eye exam.  Try gokit Jimmie for keep track of diet.

## 2024-11-19 NOTE — PROGRESS NOTES
"  Assessment & Plan     Type 2 diabetes mellitus without complication, without long-term current use of insulin (H)  -A1c 6.7, well controlled. Goal <7.  -Continue with dietary and lifestyle modifications.  -Check BS at least daily fasting for monitoring.  -Follow-up in 6 months for monitoring.    Essential hypertension with goal blood pressure less than 140/90  -Repeat reading elevated today.  -Return in two weeks for MA BP check. If BP still high will plan to increase losartan to 50 mg daily.  -Encourage pt to obtain home BP cuff and check BP intermittently.  - Home Blood Pressure Monitor Order for DME - ONLY FOR DME    Hyperlipidemia LDL goal <100  Agatston CAC score 200-399  -Continue aspirin and rosuvastatin  -Due for lipid check on rosuvastatin  - Lipid panel reflex to direct LDL Non-fasting    Other fatigue  -Eating largely vegan diet, does not appear to be getting enough protein. Recommended more well balanced diet, higher in protein rich foods. Will check iron and B12 levels.   - CBC with platelets  - Vitamin B12  - Iron and iron binding capacity  - Ferritin    Vitamin D deficiency  - Vitamin D Deficiency    Class 2 severe obesity with serious comorbidity and body mass index (BMI) of 37.0 to 37.9 in adult, unspecified obesity type (H)  -Discussed eating well balanced diet and working on increasing physical activity.  -Offered weight management referral prior, pt declined.     Encounter for vaccination  - INFLUENZA VACCINE TRIVALENT(FLUBLOK)  - RSV VACCINE (ABRYSVO)            BMI  Estimated body mass index is 37.3 kg/m  as calculated from the following:    Height as of this encounter: 1.465 m (4' 9.68\").    Weight as of this encounter: 80.1 kg (176 lb 8 oz).   Weight management plan: Discussed healthy diet and exercise guidelines          Nahed Montero is a 64 year old, presenting for the following health issues:  Hypertension and Follow Up (DM2 and HLD)        11/19/2024    12:50 PM   Additional " Questions   Roomed by Lan SPIVEY   Accompanied by self         11/19/2024    12:50 PM   Patient Reported Additional Medications   Patient reports taking the following new medications daily iron pill, 65mg     History of Present Illness       Diabetes:   She presents for follow up of diabetes.  She is checking home blood glucose a few times a month.   She checks blood glucose before and after meals.  Blood glucose is never over 200 and sometimes under 70.  When her blood glucose is low, the patient is asymptomatic for confusion, blurred vision, lethargy and reports not feeling dizzy, shaky, or weak.   She has no concerns regarding her diabetes at this time.   She is not experiencing numbness or burning in feet, excessive thirst, blurry vision, weight changes or redness, sores or blisters on feet. The patient has had a diabetic eye exam in the last 12 months. Eye exam performed on unsure. Location of last eye exam [facility] on [address].        Hyperlipidemia:  She presents for follow up of hyperlipidemia.   She is taking medication to lower cholesterol. She is not having myalgia or other side effects to statin medications.    Hypertension: She presents for follow up of hypertension.  She does not check blood pressure  regularly outside of the clinic. Outside blood pressures have been over 140/90. She follows a low salt diet.     She eats 0-1 servings of fruits and vegetables daily.She consumes 1 sweetened beverage(s) daily.She exercises with enough effort to increase her heart rate 20 to 29 minutes per day.  She exercises with enough effort to increase her heart rate 3 or less days per week.   She is taking medications regularly.     DM2: Reducing calorie intake. Walking 2-3 miles 2-3 times a week. Not losing any weight. Feeling more tired. Hx of anemia due to vaginal bleeding. Went back on iron x 1 week. Checking BS twice a day, randomly.    Diet: vegan, low salt, chicken or red meat once a month, fruit, smoothies  "with oat milk, rice, veg, whole wheat pasta. X 3 months. Feeling more tired and not able to lose weight.     HTN: Checking BP at grocery store, usually 120s.    HLD: taking rosuvastatin, no side effects.    BP Readings from Last 6 Encounters:   11/19/24 (!) 142/80   08/21/24 126/66   07/12/24 139/80   05/08/24 136/70   04/15/24 (!) 146/85   01/08/24 (!) 140/68      Wt Readings from Last 4 Encounters:   11/19/24 80.1 kg (176 lb 8 oz)   08/21/24 79.5 kg (175 lb 4.8 oz)   07/12/24 81.2 kg (179 lb)   05/08/24 80 kg (176 lb 6.4 oz)                    Objective    BP (!) 142/80 (BP Location: Right arm, Patient Position: Sitting, Cuff Size: Adult Large)   Pulse 85   Temp 97.8  F (36.6  C) (Temporal)   Ht 1.465 m (4' 9.68\")   Wt 80.1 kg (176 lb 8 oz)   LMP  (LMP Unknown)   SpO2 95%   BMI 37.30 kg/m    Body mass index is 37.3 kg/m .  Physical Exam   GENERAL: alert and no distress  RESP: lungs clear to auscultation - no rales, rhonchi or wheezes  CV: regular rate and rhythm, normal S1 S2, no S3 or S4, no murmur, click or rub, no peripheral edema   PSYCH: mentation appears normal, affect normal/bright            Signed Electronically by: Malcolm Yu DO    "

## 2024-11-19 NOTE — NURSING NOTE
Prior to immunization administration, verified patients identity using patient s name and date of birth. Please see Immunization Activity for additional information.     Screening Questionnaire for Adult Immunization    Are you sick today?   No   Do you have allergies to medications, food, a vaccine component or latex?   Yes   Have you ever had a serious reaction after receiving a vaccination?   No   Do you have a long-term health problem with heart, lung, kidney, or metabolic disease (e.g., diabetes), asthma, a blood disorder, no spleen, complement component deficiency, a cochlear implant, or a spinal fluid leak?  Are you on long-term aspirin therapy?   No   Do you have cancer, leukemia, HIV/AIDS, or any other immune system problem?   No   Do you have a parent, brother, or sister with an immune system problem?   No   In the past 3 months, have you taken medications that affect  your immune system, such as prednisone, other steroids, or anticancer drugs; drugs for the treatment of rheumatoid arthritis, Crohn s disease, or psoriasis; or have you had radiation treatments?   No   Have you had a seizure, or a brain or other nervous system problem?   No   During the past year, have you received a transfusion of blood or blood    products, or been given immune (gamma) globulin or antiviral drug?   No   For women: Are you pregnant or is there a chance you could become       pregnant during the next month?   No   Have you received any vaccinations in the past 4 weeks?   No     Immunization questionnaire was positive for at least one answer.  Notified Dr. Yu.      Patient instructed to remain in clinic for 15 minutes afterwards, and to report any adverse reactions.     Screening performed by Jessica Vasques MA on 11/19/2024 at 1:43 PM.

## 2024-11-20 LAB
CHOLEST SERPL-MCNC: 152 MG/DL
FASTING STATUS PATIENT QL REPORTED: NO
FERRITIN SERPL-MCNC: 105 NG/ML (ref 11–328)
HDLC SERPL-MCNC: 46 MG/DL
IRON BINDING CAPACITY (ROCHE): 338 UG/DL (ref 240–430)
IRON SATN MFR SERPL: 32 % (ref 15–46)
IRON SERPL-MCNC: 107 UG/DL (ref 37–145)
LDLC SERPL CALC-MCNC: 74 MG/DL
NONHDLC SERPL-MCNC: 106 MG/DL
TRIGL SERPL-MCNC: 159 MG/DL
VIT B12 SERPL-MCNC: 997 PG/ML (ref 232–1245)
VIT D+METAB SERPL-MCNC: 40 NG/ML (ref 20–50)

## 2024-11-25 ENCOUNTER — TELEPHONE (OUTPATIENT)
Dept: CARDIOLOGY | Facility: CLINIC | Age: 64
End: 2024-11-25
Payer: COMMERCIAL

## 2024-11-25 DIAGNOSIS — I10 ESSENTIAL HYPERTENSION WITH GOAL BLOOD PRESSURE LESS THAN 140/90: ICD-10-CM

## 2024-11-25 DIAGNOSIS — R93.1 AGATSTON CAC SCORE 200-399: Primary | ICD-10-CM

## 2024-11-25 NOTE — TELEPHONE ENCOUNTER
11/26/2024 9:45AM Candice Simental  Patient confirmed scheduled appointment via [facility]:  Date: 12/2/2024  Time: 1:45PM  Visit type: Labs  Provider: Per Dr. Mitchell  [place] [address] [place] [address]  Testing/imaging: New Lipid Management w/ Dr. Mitchell (3rd Floor L&N) at 2:30PM  Additional notes: 11/26 Scheduled labs prior to 12/2 appt per Dr. Mitchell, confirmed via MYC. KING Simental 11/26/2024 9:45AM        11/25/2024 5:49PM Candice Simental  Patient Contacted for the patient to call back and schedule the following:    Appointment type: Non-fasting labs  Provider: Per Dr. Mitchell  Return date: Prior to 12/2 appt w/ Dr. Mitchell  Specialty phone number: [phone] option 1  Additional appointment(s) needed: NA  Additonal Notes: 11/25 Called to schedule nonfasting labs prior to Dr. Mitchell appt 12/2, but pt not sure if they are covered by her insurance. Sent MYC message w/ labs Dr. Mitchell is requesting and she will call her insurance tomorrow. Otherwise she'll cancel/reschedule. KING Simental 11/25/2024 5:49PM

## 2024-12-02 ENCOUNTER — MYC REFILL (OUTPATIENT)
Dept: FAMILY MEDICINE | Facility: CLINIC | Age: 64
End: 2024-12-02

## 2024-12-02 DIAGNOSIS — E78.5 HYPERLIPIDEMIA LDL GOAL <100: ICD-10-CM

## 2024-12-03 RX ORDER — ROSUVASTATIN CALCIUM 5 MG/1
5 TABLET, COATED ORAL DAILY
Qty: 90 TABLET | Refills: 3 | OUTPATIENT
Start: 2024-12-03

## 2024-12-03 RX ORDER — ASPIRIN 81 MG/1
81 TABLET ORAL DAILY
Qty: 90 TABLET | Refills: 3 | OUTPATIENT
Start: 2024-12-03

## 2025-01-11 ENCOUNTER — MYC REFILL (OUTPATIENT)
Dept: FAMILY MEDICINE | Facility: CLINIC | Age: 65
End: 2025-01-11
Payer: COMMERCIAL

## 2025-01-11 DIAGNOSIS — I10 ESSENTIAL HYPERTENSION WITH GOAL BLOOD PRESSURE LESS THAN 140/90: ICD-10-CM

## 2025-01-11 DIAGNOSIS — R80.9 MICROALBUMINURIA: ICD-10-CM

## 2025-01-11 DIAGNOSIS — E78.5 HYPERLIPIDEMIA LDL GOAL <100: ICD-10-CM

## 2025-01-11 DIAGNOSIS — E11.9 TYPE 2 DIABETES MELLITUS WITHOUT COMPLICATION, WITHOUT LONG-TERM CURRENT USE OF INSULIN (H): ICD-10-CM

## 2025-01-12 RX ORDER — LOSARTAN POTASSIUM 25 MG/1
25 TABLET ORAL DAILY
Qty: 90 TABLET | Refills: 1 | OUTPATIENT
Start: 2025-01-12

## 2025-01-12 RX ORDER — ASPIRIN 81 MG/1
81 TABLET ORAL DAILY
Qty: 90 TABLET | Refills: 3 | OUTPATIENT
Start: 2025-01-12

## 2025-01-14 ENCOUNTER — ONCOLOGY VISIT (OUTPATIENT)
Dept: ONCOLOGY | Facility: CLINIC | Age: 65
End: 2025-01-14
Attending: NURSE PRACTITIONER
Payer: COMMERCIAL

## 2025-01-14 VITALS
DIASTOLIC BLOOD PRESSURE: 84 MMHG | HEART RATE: 85 BPM | WEIGHT: 176.8 LBS | SYSTOLIC BLOOD PRESSURE: 140 MMHG | OXYGEN SATURATION: 96 % | BODY MASS INDEX: 37.37 KG/M2 | TEMPERATURE: 97.7 F | RESPIRATION RATE: 16 BRPM

## 2025-01-14 DIAGNOSIS — Z85.42 ENCOUNTER FOR FOLLOW-UP SURVEILLANCE OF ENDOMETRIAL CANCER: ICD-10-CM

## 2025-01-14 DIAGNOSIS — Z08 ENCOUNTER FOR FOLLOW-UP SURVEILLANCE OF ENDOMETRIAL CANCER: ICD-10-CM

## 2025-01-14 PROCEDURE — G0463 HOSPITAL OUTPT CLINIC VISIT: HCPCS | Performed by: NURSE PRACTITIONER

## 2025-01-14 PROCEDURE — 99213 OFFICE O/P EST LOW 20 MIN: CPT | Performed by: NURSE PRACTITIONER

## 2025-01-14 ASSESSMENT — PAIN SCALES - GENERAL: PAINLEVEL_OUTOF10: NO PAIN (0)

## 2025-01-14 NOTE — NURSING NOTE
"Oncology Rooming Note    January 14, 2025 12:07 PM   Kylah Ojeda is a 64 year old female who presents for:    Chief Complaint   Patient presents with    Oncology Clinic Visit     Initial Vitals: BP (!) 140/84   Pulse 85   Temp 97.7  F (36.5  C) (Oral)   Resp 16   Wt 80.2 kg (176 lb 12.8 oz)   LMP  (LMP Unknown)   SpO2 96%   BMI 37.37 kg/m   Estimated body mass index is 37.37 kg/m  as calculated from the following:    Height as of 11/19/24: 1.465 m (4' 9.68\").    Weight as of this encounter: 80.2 kg (176 lb 12.8 oz). Body surface area is 1.81 meters squared.  No Pain (0) Comment: Data Unavailable   No LMP recorded (lmp unknown). Patient has had a hysterectomy.  Allergies reviewed: Yes  Medications reviewed: Yes    Medications: Medication refills not needed today.  Pharmacy name entered into [facility]: [facility] - [place], [place] - [address]    Frailty Screening:   Is the patient here for a new oncology consult visit in cancer care? 2. No      Clinical concerns: follow up        Lindsay Peres            "

## 2025-01-14 NOTE — LETTER
2025      Kylah Ojeda  [address]  [facility] [address]      Dear Colleague,    Thank you for referring your patient, Kylah Ojeda, to the [facility]. Please see a copy of my visit note below.    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 Denies unintentional weight loss, soaking night sweats, trouble breathing, abdominal bloating/fullness, persistent abdominal or pelvic pain, vaginal bleeding, new bowel or bladder concerns, swollen nodes, focal bony pain, or swelling. Denies pelvic/vaginal/sexual concerns.    Follows with PCP routinely. Due for mammogram. Up to date on CRC screening- Cologuard performed 2023 and negative. Exercising, working on weight loss. Plans to start strength training.    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      ONCOLOGY HISTORY:  6/10/2023-2023: Admission to [facility] for hemorrhagic shock and uterine mass. Had XLap/JAVIER/BSO/PPLND. Final pathology stage 1A grade 2 endometrial adenocarcinoma. NSMP. MMR proficient. Non myoinvasive. No LVSI.           OBJECTIVE:    PHYSICAL EXAM:  BP (!) 140/84   Pulse 85   Temp 97.7  F (36.5  C) (Oral)   Resp 16   Wt 80.2 kg (176 lb 12.8 oz)   LMP  (LMP Unknown)   SpO2 96%   BMI 37.37 kg/m       CONSTITUTIONAL: Alert non-toxic appearing female in no acute distress  RESPIRATORY: Respiratory effort unlabored  CV/PV: Bilateral lower extremities without edema  GASTROINTESTINAL: Abdomen soft, non-distended, and non-tender to palpation without masses or organomegaly  GENITOURINARY: External genitalia and urethral meatus pink without lesions, masses, or excoriation. Vagina pink and smooth without masses or lesions. Cervix surgically absent.  Vaginal cuff without masses or lesions. Bimanual exam reveals no masses or fullness. Rectovaginal exam confirms these findings.  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  Continue surveillance every 6 months x2 years (through 7/2025) then annually thereafter with pelvic exam.   No further imaging unless clinically indicated. No routine labs. Pap no longer indicated.  Return to clinic in 6 month(s) with Amanda Boyer CNP for surveillance.   Reviewed signs and symptoms of recurrent disease and when to seek further care.    Treatment/disease related effects:  None    Genetics:   Genetic counseling referral not indicated at this time- mismatch repair proteins intact, making Perdomo syndrome unlikely.    Health maintenance:   Follow up with PCP for routine cancer screenings, non-gynecologic concerns, and co-morbid conditions    Patient verbalized understanding of and agreement with plan- see AVS for patient instructions      20 minutes spent on date of service on visit, including chart review, face to face visit, documentation, and coordination of care.    IRENE Melendez, CNP  Division of Gynecologic Oncology        Again, thank you for allowing me to participate in the care of your patient.        Sincerely,        IRENE Melendez CNP    Electronically signed

## 2025-01-14 NOTE — PATIENT INSTRUCTIONS
Your visit today was with Amanda Boyer CNP for surveillance.    Plan:  No evidence of disease on today's exam  Return to clinic in 6 month(s) with Amanda Boyer CNP for surveillance  Surveillance visits every 6 months x2 years (through this summer), then annually x3 years (through summer 2029). May transition to general practitioner after five years for annual pelvic exam.  Please schedule your mammogram!    SIGNS AND SYMPTOMS OF RECURRENT DISEASE    Symptoms of cancer coming back can vary from person to person and it's important to know your own baseline and health factors that may cause symptoms. The following list includes symptoms that would warrant further investigation with your oncology team.    Vaginal bleeding or spotting  Persistent pelvic, abdominal, or bone pain that is persistent and does not improve over time  New persistent dry cough or shortness of breath at rest without a known cause  Unintentional weight loss  New swelling in the legs  New changes in bowel/bladder patterns  New, persistent bloating/fullness  New, persistent onset of drenching night sweats  New, persistent lumps or bumps in the groin or neck  New severe headaches, tunnel vision, double vision, or seizures  Sudden and persistent fatigue without a known cause, particularly if associated with any of the above symptoms    If you have symptoms that are new, persistent, or concerning to you and have been ongoing for two weeks or longer, please contact your oncology care team.    [facility] triage: [phone]    If you have difficulty reaching triage during off hours, you can call [phone] and ask to speak to the gynecologic oncology resident on call      For urgent questions or concerns, please call rather than send a medical message.

## 2025-01-14 NOTE — PROGRESS NOTES
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 Denies unintentional weight loss, soaking night sweats, trouble breathing, abdominal bloating/fullness, persistent abdominal or pelvic pain, vaginal bleeding, new bowel or bladder concerns, swollen nodes, focal bony pain, or swelling. Denies pelvic/vaginal/sexual concerns.    Follows with PCP routinely. Due for mammogram. Up to date on CRC screening- Cologuard performed 2023 and negative. Exercising, working on weight loss. Plans to start strength training.    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      ONCOLOGY HISTORY:  6/10/2023-2023: Admission to [facility] for hemorrhagic shock and uterine mass. Had XLap/JAVIER/BSO/PPLND. Final pathology stage 1A grade 2 endometrial adenocarcinoma. NSMP. MMR proficient. Non myoinvasive. No LVSI.           OBJECTIVE:    PHYSICAL EXAM:  BP (!) 140/84   Pulse 85   Temp 97.7  F (36.5  C) (Oral)   Resp 16   Wt 80.2 kg (176 lb 12.8 oz)   LMP  (LMP Unknown)   SpO2 96%   BMI 37.37 kg/m       CONSTITUTIONAL: Alert non-toxic appearing female in no acute distress  RESPIRATORY: Respiratory effort unlabored  CV/PV: Bilateral lower extremities without edema  GASTROINTESTINAL: Abdomen soft, non-distended, and non-tender to palpation without masses or organomegaly  GENITOURINARY: External genitalia and urethral meatus pink without lesions, masses, or excoriation. Vagina pink and smooth without masses or lesions. Cervix surgically absent. Vaginal cuff without masses or lesions. Bimanual exam reveals no masses or fullness. Rectovaginal exam confirms these findings.  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  Continue surveillance every 6 months x2 years (through 7/2025) then annually thereafter with pelvic exam.   No further imaging unless clinically indicated. No routine labs. Pap no longer indicated.  Return to clinic in 6 month(s) with Amanda Boyer CNP for surveillance.   Reviewed signs and symptoms of recurrent disease and when to seek further care.    Treatment/disease related effects:  None    Genetics:   Genetic counseling referral not indicated at this time- mismatch repair proteins intact, making Perdomo syndrome unlikely.    Health maintenance:   Follow up with PCP for routine cancer screenings, non-gynecologic concerns, and co-morbid conditions    Patient verbalized understanding of and agreement with plan- see AVS for patient instructions      20 minutes spent on date of service on visit, including chart review, face to face visit, documentation, and coordination of care.    IRENE Melendez, CNP  Division of Gynecologic Oncology

## 2025-01-27 DIAGNOSIS — I10 ESSENTIAL HYPERTENSION WITH GOAL BLOOD PRESSURE LESS THAN 140/90: Primary | ICD-10-CM

## 2025-02-04 ENCOUNTER — PATIENT OUTREACH (OUTPATIENT)
Dept: CARE COORDINATION | Facility: CLINIC | Age: 65
End: 2025-02-04
Payer: COMMERCIAL

## 2025-02-06 ENCOUNTER — OFFICE VISIT (OUTPATIENT)
Dept: CARDIOLOGY | Facility: CLINIC | Age: 65
End: 2025-02-06
Attending: INTERNAL MEDICINE
Payer: COMMERCIAL

## 2025-02-06 ENCOUNTER — LAB (OUTPATIENT)
Dept: LAB | Facility: CLINIC | Age: 65
End: 2025-02-06
Attending: INTERNAL MEDICINE
Payer: COMMERCIAL

## 2025-02-06 VITALS
OXYGEN SATURATION: 98 % | BODY MASS INDEX: 37.28 KG/M2 | WEIGHT: 176.4 LBS | DIASTOLIC BLOOD PRESSURE: 74 MMHG | SYSTOLIC BLOOD PRESSURE: 145 MMHG | HEART RATE: 89 BPM

## 2025-02-06 DIAGNOSIS — R93.1 AGATSTON CAC SCORE 200-399: ICD-10-CM

## 2025-02-06 DIAGNOSIS — I10 ESSENTIAL HYPERTENSION WITH GOAL BLOOD PRESSURE LESS THAN 140/90: ICD-10-CM

## 2025-02-06 DIAGNOSIS — Z82.49 FAMILY HISTORY OF ISCHEMIC HEART DISEASE: ICD-10-CM

## 2025-02-06 DIAGNOSIS — E78.5 HYPERLIPIDEMIA LDL GOAL <100: ICD-10-CM

## 2025-02-06 LAB
ALBUMIN SERPL BCG-MCNC: 4.6 G/DL (ref 3.5–5.2)
ALP SERPL-CCNC: 89 U/L (ref 40–150)
ALT SERPL W P-5'-P-CCNC: 20 U/L (ref 0–50)
ANION GAP SERPL CALCULATED.3IONS-SCNC: 11 MMOL/L (ref 7–15)
APO A-I SERPL-MCNC: <6 MG/DL
AST SERPL W P-5'-P-CCNC: 20 U/L (ref 0–45)
BILIRUB SERPL-MCNC: 0.5 MG/DL
BUN SERPL-MCNC: 15.4 MG/DL (ref 8–23)
CALCIUM SERPL-MCNC: 10 MG/DL (ref 8.8–10.4)
CHLORIDE SERPL-SCNC: 102 MMOL/L (ref 98–107)
CREAT SERPL-MCNC: 0.75 MG/DL (ref 0.51–0.95)
CREAT UR-MCNC: 115 MG/DL
EGFRCR SERPLBLD CKD-EPI 2021: 88 ML/MIN/1.73M2
GLUCOSE SERPL-MCNC: 95 MG/DL (ref 70–99)
HCO3 SERPL-SCNC: 29 MMOL/L (ref 22–29)
MICROALBUMIN UR-MCNC: 98.4 MG/L
MICROALBUMIN/CREAT UR: 85.57 MG/G CR (ref 0–25)
POTASSIUM SERPL-SCNC: 4.1 MMOL/L (ref 3.4–5.3)
PROT SERPL-MCNC: 8.4 G/DL (ref 6.4–8.3)
SODIUM SERPL-SCNC: 142 MMOL/L (ref 135–145)

## 2025-02-06 PROCEDURE — 93005 ELECTROCARDIOGRAM TRACING: CPT

## 2025-02-06 PROCEDURE — 99204 OFFICE O/P NEW MOD 45 MIN: CPT | Performed by: INTERNAL MEDICINE

## 2025-02-06 PROCEDURE — 83695 ASSAY OF LIPOPROTEIN(A): CPT | Performed by: INTERNAL MEDICINE

## 2025-02-06 PROCEDURE — 82043 UR ALBUMIN QUANTITATIVE: CPT | Performed by: INTERNAL MEDICINE

## 2025-02-06 PROCEDURE — 82570 ASSAY OF URINE CREATININE: CPT | Performed by: INTERNAL MEDICINE

## 2025-02-06 PROCEDURE — 99000 SPECIMEN HANDLING OFFICE-LAB: CPT | Performed by: PATHOLOGY

## 2025-02-06 PROCEDURE — 36415 COLL VENOUS BLD VENIPUNCTURE: CPT | Performed by: PATHOLOGY

## 2025-02-06 PROCEDURE — G0463 HOSPITAL OUTPT CLINIC VISIT: HCPCS | Performed by: INTERNAL MEDICINE

## 2025-02-06 PROCEDURE — 80053 COMPREHEN METABOLIC PANEL: CPT | Performed by: PATHOLOGY

## 2025-02-06 ASSESSMENT — PAIN SCALES - GENERAL: PAINLEVEL_OUTOF10: NO PAIN (0)

## 2025-02-06 NOTE — Clinical Note
2/6/2025      RE: Kylah Ojeda  [address]  [facility] [address]       Dear Colleague,    Thank you for the opportunity to participate in the care of your patient, Kylah Ojeda, at the [facility] [facility]. Please see a copy of my visit note below.    No notes on file    Please do not hesitate to contact me if you have any questions/concerns.     Sincerely,     Zion Mitchell MD

## 2025-02-06 NOTE — PATIENT INSTRUCTIONS
February 6, 2025    Cardiology Provider You Saw During Your Visit: Dr. Mitchell      Medication Changes: none      Follow Up: as needed      Labs:      Latest Reference Range & Units 02/06/25 10:50   Sodium 135 - 145 mmol/L 142   Potassium 3.4 - 5.3 mmol/L 4.1   Chloride 98 - 107 mmol/L 102   Carbon Dioxide (CO2) 22 - 29 mmol/L 29   Urea Nitrogen 8.0 - 23.0 mg/dL 15.4   Creatinine 0.51 - 0.95 mg/dL 0.75   GFR Estimate >60 mL/min/1.73m2 88   Calcium 8.8 - 10.4 mg/dL 10.0   Anion Gap 7 - 15 mmol/L 11   Albumin 3.5 - 5.2 g/dL 4.6   Protein Total 6.4 - 8.3 g/dL 8.4 (H)   Alkaline Phosphatase 40 - 150 U/L 89   ALT 0 - 50 U/L 20   AST 0 - 45 U/L 20   Bilirubin Total <=1.2 mg/dL 0.5   Glucose 70 - 99 mg/dL 95   (H): Data is abnormally high        Follow the American Heart Association Diet and Lifestyle Recommendations:  -Limit saturated fat, trans fat, sodium, red meat, sweets and sugar-sweetened beverages. If you choose to eat red meat, compare labels and select the leanest cuts available.  -Aim for at least 150 minutes of moderate physical activity or 75 minutes of vigorous physical activity - or an equal combination of both - each week.      To Reach Us:  -During business hours: [phone], press option # 1 to schedule an appointment or to leave a message for your care team.     -After hours, weekends or holidays: [phone], press option #4 and ask to speak to the on-call cardiologist. Inform them you are a patient at the [facility].        **If you have a cardiac device, please make sure you schedule an in-person device check just prior to your cardiology provider appointments**        Shari Sierra RN  Cardiology Care Coordinator - General Cardiology  [facility]

## 2025-02-06 NOTE — PROGRESS NOTES
HPI: ***    PAST MEDICAL HISTORY:  Past Medical History:   Diagnosis Date    Diabetes mellitus (H)     HTN (hypertension)     Malignant neoplasm of uterus (H)     s/p JAVIER, BSO, staging, 6/12/2023       CURRENT MEDICATIONS:  Current Outpatient Medications   Medication Sig Dispense Refill    amLODIPine (NORVASC) 10 MG tablet TAKE ONE TABLET BY MOUTH ONCE DAILY 90 tablet 2    aspirin 81 MG EC tablet Take 1 tablet (81 mg) by mouth daily. 90 tablet 3    blood glucose (NO BRAND SPECIFIED) test strip Use to test blood sugar 2 times daily or as directed. To accompany: Blood Glucose Monitor Brands: per insurance. 100 strip 6    blood glucose monitoring (NO BRAND SPECIFIED) meter device kit Use to test blood sugar 2 times daily or as directed. Preferred blood glucose meter OR supplies to accompany: Blood Glucose Monitor Brands: per insurance. 1 kit 0    losartan (COZAAR) 25 MG tablet TAKE ONE TABLET BY MOUTH ONCE DAILY 90 tablet 1    Multiple Vitamins-Minerals (ONE-A-DAY 50 PLUS PO) Take 1 tablet by mouth daily      rosuvastatin (CRESTOR) 5 MG tablet Take 1 tablet (5 mg) by mouth daily. 90 tablet 3    thin (NO BRAND SPECIFIED) lancets Use with lanceting device. To accompany: Blood Glucose Monitor Brands: per insurance. 100 each 6       PAST SURGICAL HISTORY:  Past Surgical History:   Procedure Laterality Date    HYSTERECTOMY TOTAL ABDOMINAL, BILATERAL SALPINGO-OOPHORECTOMY, NODE DISSECTION, COMBINED Bilateral 6/12/2023    Procedure: Hysterectomy total abdominal, bilateral salpingo-oophorectomy, pelvic and para-aortic node dissection, combined;  Surgeon: Karena Dunlap MD;  Location:  OR       ALLERGIES     Allergies   Allergen Reactions    Codeine Dizziness       FAMILY HISTORY:  Family History   Problem Relation Age of Onset    Diabetes Mother     Coronary Artery Disease Mother         Mother may have had this as part of her diabetes; not sure.    Hypertension Mother         I'm not certain that she had this.     Hyperlipidemia Mother         I'm not certain that she had this.    Other Cancer Mother         Had mole removed, nothing further.    Obesity Mother         Not sure if she was large enough to be obese but was overweight later in life.    Cerebrovascular Disease Father         I'm not certain; had symptons but did not see a doctor.    Substance Abuse Father         Alcoholic.    Breast Cancer No family hx of     Colon Cancer No family hx of        SOCIAL HISTORY:  Social History     Socioeconomic History    Marital status: Single     Spouse name: None    Number of children: None    Years of education: None    Highest education level: None   Tobacco Use    Smoking status: Never     Passive exposure: Never    Smokeless tobacco: Never    Tobacco comments:     Parents smoked when I was young.   Vaping Use    Vaping status: Never Used   Substance and Sexual Activity    Alcohol use: Not Currently     Comment: I drink once or twice a year.    Drug use: Never    Sexual activity: Not Currently     Partners: Male     Birth control/protection: Pill     Comment: Took the Pill from approximately 1978 to 1994.   Other Topics Concern    Parent/sibling w/ CABG, MI or angioplasty before 65F 55M? No     Social Drivers of Health     Financial Resource Strain: Low Risk  (1/2/2024)    Financial Resource Strain     Within the past 12 months, have you or your family members you live with been unable to get utilities (heat, electricity) when it was really needed?: No   Food Insecurity: Low Risk  (1/2/2024)    Food Insecurity     Within the past 12 months, did you worry that your food would run out before you got money to buy more?: No     Within the past 12 months, did the food you bought just not last and you didn t have money to get more?: No   Transportation Needs: Low Risk  (1/2/2024)    Transportation Needs     Within the past 12 months, has lack of transportation kept you from medical appointments, getting your medicines, non-medical  meetings or appointments, work, or from getting things that you need?: No   Interpersonal Safety: Low Risk  (11/19/2024)    Interpersonal Safety     Do you feel physically and emotionally safe where you currently live?: Yes     Within the past 12 months, have you been hit, slapped, kicked or otherwise physically hurt by someone?: No     Within the past 12 months, have you been humiliated or emotionally abused in other ways by your partner or ex-partner?: No   Housing Stability: Low Risk  (1/2/2024)    Housing Stability     Do you have housing? : Yes     Are you worried about losing your housing?: No       ROS:   Constitutional: No fever, chills, or sweats. No weight gain/loss   ENT: No visual disturbance, ear ache, epistaxis, sore throat  Allergies/Immunologic: Negative.   Respiratory: No cough, hemoptysia  Cardiovascular: As per HPI  GI: No nausea, vomiting, hematemesis, melena, or hematochezia  : No urinary frequency, dysuria, or hematuria  Integument: Negative  Psychiatric: Negative  Neuro: Negative  Endocrinology: Negative   Musculoskeletal: Negative    EXAM:  BP (!) 145/74 (BP Location: Right arm, Patient Position: Chair, Cuff Size: Adult Regular)   Pulse 89   Wt 80 kg (176 lb 6.4 oz)   LMP  (LMP Unknown)   SpO2 98%   BMI 37.28 kg/m    In general, the patient is a pleasant female in no apparent distress.    HEENT: NC/AT.  PERRLA.  EOMI.  Sclerae white, not injected.  Nares clear.  Pharynx without erythema or exudate.  Dentition intact.    Neck: No adenopathy.  No thyromegaly. Carotids +4/4 bilaterally without bruits.  No jugular venous distension.   Heart: RRR. Normal S1, S2 splits physiologically. No murmur, rub, click, or gallop. The PMI is in the 5th ICS in the midclavicular line. There is no heave.    Lungs: CTA.  No ronchi, wheezes, rales.  No dullness to percussion.   Abdomen: Soft, nontender, nondistended. No organomegaly.  No bruits.   Extremities: No clubbing, cyanosis, or edema.  The pulses are  +4/4 at the radial, brachial, femoral, popliteal, DP, and PT sites bilaterally.  No bruits are noted.  Neurologic: Alert and oriented to person/place/time, normal speech, gait and affect  Skin: No petechiae, purpura or rash.    Right 128/76, left 124/74    Labs:  LIPID RESULTS:  Lab Results   Component Value Date    CHOL 152 11/19/2024    HDL 46 (L) 11/19/2024    LDL 74 11/19/2024    TRIG 159 (H) 11/19/2024    NHDL 106 11/19/2024       LIVER ENZYME RESULTS:  Lab Results   Component Value Date    AST 20 02/06/2025    ALT 20 02/06/2025       CBC RESULTS:  Lab Results   Component Value Date    WBC 7.1 11/19/2024    RBC 5.48 (H) 11/19/2024    HGB 15.1 11/19/2024    HCT 44.0 11/19/2024    MCV 80 11/19/2024    MCH 27.6 11/19/2024    MCHC 34.3 11/19/2024    RDW 13.0 11/19/2024     11/19/2024       BMP RESULTS:  Lab Results   Component Value Date     02/06/2025    POTASSIUM 4.1 02/06/2025    POTASSIUM 4.1 06/10/2023    CHLORIDE 102 02/06/2025    CO2 29 02/06/2025    ANIONGAP 11 02/06/2025    GLC 95 02/06/2025    GLC 99 06/14/2023    BUN 15.4 02/06/2025    CR 0.75 02/06/2025    GFRESTIMATED 88 02/06/2025    TITO 10.0 02/06/2025        A1C RESULTS:  Lab Results   Component Value Date    A1C 6.7 (H) 11/19/2024       INR RESULTS:  Lab Results   Component Value Date    INR 1.04 06/10/2023       Procedures:      Assessment and Plan: ***      CC  Patient Care Team:  Emma Yu DO as PCP - General (Family Medicine)  Emma Yu DO as Assigned PCP  Jeramy Ignacio OD as MD (Optometry)  Jeramy Ignacio OD as Assigned Surgical Provider  Amanda Boyer APRN CNP as Nurse Practitioner (Gynecologic Oncology)  Amanda Boyer APRN CNP as Assigned OBGYN Provider  Zion Mitchell MD as MD (Cardiovascular Disease)  EMMA YU     102    Carbon Dioxide (CO2) 22 - 29 mmol/L 29    Urea Nitrogen 8.0 - 23.0 mg/dL 15.4    Creatinine 0.51 - 0.95 mg/dL 0.75    GFR Estimate >60 mL/min/1.73m2 88    Calcium 8.8 - 10.4 mg/dL 10.0    Anion Gap 7 - 15 mmol/L 11    Albumin 3.5 - 5.2 g/dL 4.6    Protein Total 6.4 - 8.3 g/dL 8.4 (H)    Alkaline Phosphatase 40 - 150 U/L 89    ALT 0 - 50 U/L 20    AST 0 - 45 U/L 20    Albumin Urine mg/g Cr 0.00 - 25.00 mg/g Cr  85.57 (H)   Albumin Urine mg/L mg/L  98.4   Bilirubin Total <=1.2 mg/dL 0.5    Creatinine Urine mg/dL  115.0   Glucose 70 - 99 mg/dL 95    Lipoprotein (a) <30 mg/dL <6    (H): Data is abnormally high            Assessment and Plan:     I discussed the results with patient.  I I discussed the importance of a heart healthy diet and lifestyle - beyond control of BP, lipids also gluse.  I recommend the same medical regimen.    Zion Mitchell MD, PhD  Professor of Medicine  Division of Cardiology         CC  Patient Care Team:  Emma Yu DO as PCP - General (Family Medicine)  Emma Yu DO as Assigned PCP  Jeramy Ignacio OD as MD (Optometry)  Jeramy Ignacio OD as Assigned Surgical Provider  Amanda Boyer APRN CNP as Nurse Practitioner (Gynecologic Oncology)  Amanda Boyer APRN CNP as Assigned OBGYN Provider  Zion Mitchell MD as MD (Cardiovascular Disease)  EMMA YU

## 2025-02-10 ENCOUNTER — TELEPHONE (OUTPATIENT)
Dept: CARDIOLOGY | Facility: CLINIC | Age: 65
End: 2025-02-10
Payer: COMMERCIAL

## 2025-02-12 LAB
ATRIAL RATE - MUSE: 85 BPM
DIASTOLIC BLOOD PRESSURE - MUSE: NORMAL MMHG
INTERPRETATION ECG - MUSE: NORMAL
P AXIS - MUSE: 72 DEGREES
PR INTERVAL - MUSE: 146 MS
QRS DURATION - MUSE: 82 MS
QT - MUSE: 360 MS
QTC - MUSE: 428 MS
R AXIS - MUSE: 20 DEGREES
SYSTOLIC BLOOD PRESSURE - MUSE: NORMAL MMHG
T AXIS - MUSE: 32 DEGREES
VENTRICULAR RATE- MUSE: 85 BPM

## 2025-04-07 DIAGNOSIS — R80.9 MICROALBUMINURIA: ICD-10-CM

## 2025-04-07 DIAGNOSIS — E11.9 TYPE 2 DIABETES MELLITUS WITHOUT COMPLICATION, WITHOUT LONG-TERM CURRENT USE OF INSULIN (H): ICD-10-CM

## 2025-04-07 DIAGNOSIS — I10 ESSENTIAL HYPERTENSION WITH GOAL BLOOD PRESSURE LESS THAN 140/90: ICD-10-CM

## 2025-04-07 RX ORDER — LOSARTAN POTASSIUM 25 MG/1
25 TABLET ORAL DAILY
Qty: 90 TABLET | Refills: 0 | Status: SHIPPED | OUTPATIENT
Start: 2025-04-07

## 2025-05-12 ENCOUNTER — MYC MEDICAL ADVICE (OUTPATIENT)
Dept: FAMILY MEDICINE | Facility: CLINIC | Age: 65
End: 2025-05-12
Payer: COMMERCIAL

## 2025-05-14 NOTE — TELEPHONE ENCOUNTER
Writer responded via [employer].  Kadi ROSA, BSN, PHN, RN-[facility]  [phone]

## 2025-05-14 NOTE — TELEPHONE ENCOUNTER
Dr. Yu: please see pt response; would you like to see her?    Kadi ROSA BSN, PHN, RN-[facility]  [phone]

## 2025-05-15 NOTE — TELEPHONE ENCOUNTER
Let's get her in this week with me or any other available provider. Pls use approval only slot if needed.    Malcolm Yu, DO

## 2025-05-20 ENCOUNTER — OFFICE VISIT (OUTPATIENT)
Dept: FAMILY MEDICINE | Facility: CLINIC | Age: 65
End: 2025-05-20
Payer: COMMERCIAL

## 2025-05-20 VITALS
RESPIRATION RATE: 15 BRPM | HEIGHT: 59 IN | OXYGEN SATURATION: 98 % | BODY MASS INDEX: 35.28 KG/M2 | DIASTOLIC BLOOD PRESSURE: 86 MMHG | HEART RATE: 86 BPM | SYSTOLIC BLOOD PRESSURE: 148 MMHG | WEIGHT: 175 LBS | TEMPERATURE: 97.9 F

## 2025-05-20 DIAGNOSIS — E11.29 TYPE 2 DIABETES MELLITUS WITH DIABETIC MICROALBUMINURIA, WITHOUT LONG-TERM CURRENT USE OF INSULIN (H): ICD-10-CM

## 2025-05-20 DIAGNOSIS — I10 ESSENTIAL HYPERTENSION WITH GOAL BLOOD PRESSURE LESS THAN 140/90: ICD-10-CM

## 2025-05-20 DIAGNOSIS — R80.9 TYPE 2 DIABETES MELLITUS WITH DIABETIC MICROALBUMINURIA, WITHOUT LONG-TERM CURRENT USE OF INSULIN (H): ICD-10-CM

## 2025-05-20 DIAGNOSIS — E66.01 CLASS 2 SEVERE OBESITY DUE TO EXCESS CALORIES WITH SERIOUS COMORBIDITY AND BODY MASS INDEX (BMI) OF 36.0 TO 36.9 IN ADULT (H): ICD-10-CM

## 2025-05-20 DIAGNOSIS — C54.1 ENDOMETRIAL CANCER (H): ICD-10-CM

## 2025-05-20 DIAGNOSIS — E66.812 CLASS 2 SEVERE OBESITY DUE TO EXCESS CALORIES WITH SERIOUS COMORBIDITY AND BODY MASS INDEX (BMI) OF 36.0 TO 36.9 IN ADULT (H): ICD-10-CM

## 2025-05-20 DIAGNOSIS — E11.65 TYPE 2 DIABETES MELLITUS WITH HYPERGLYCEMIA, WITHOUT LONG-TERM CURRENT USE OF INSULIN (H): Primary | ICD-10-CM

## 2025-05-20 PROBLEM — E11.9 TYPE 2 DIABETES MELLITUS WITHOUT COMPLICATION, WITHOUT LONG-TERM CURRENT USE OF INSULIN (H): Status: RESOLVED | Noted: 2023-07-05 | Resolved: 2025-05-20

## 2025-05-20 LAB
EST. AVERAGE GLUCOSE BLD GHB EST-MCNC: 169 MG/DL
HBA1C MFR BLD: 7.5 % (ref 0–5.6)

## 2025-05-20 PROCEDURE — 3077F SYST BP >= 140 MM HG: CPT | Performed by: FAMILY MEDICINE

## 2025-05-20 PROCEDURE — 3079F DIAST BP 80-89 MM HG: CPT | Performed by: FAMILY MEDICINE

## 2025-05-20 PROCEDURE — G2211 COMPLEX E/M VISIT ADD ON: HCPCS | Performed by: FAMILY MEDICINE

## 2025-05-20 PROCEDURE — 36415 COLL VENOUS BLD VENIPUNCTURE: CPT | Performed by: FAMILY MEDICINE

## 2025-05-20 PROCEDURE — 83036 HEMOGLOBIN GLYCOSYLATED A1C: CPT | Performed by: FAMILY MEDICINE

## 2025-05-20 PROCEDURE — 99214 OFFICE O/P EST MOD 30 MIN: CPT | Performed by: FAMILY MEDICINE

## 2025-05-20 RX ORDER — METFORMIN HYDROCHLORIDE 500 MG/1
TABLET, EXTENDED RELEASE ORAL
Qty: 113 TABLET | Refills: 0 | Status: SHIPPED | OUTPATIENT
Start: 2025-05-20 | End: 2025-07-19

## 2025-05-20 RX ORDER — LANCETS
EACH MISCELLANEOUS
Qty: 100 EACH | Refills: 6 | Status: SHIPPED | OUTPATIENT
Start: 2025-05-20

## 2025-05-20 NOTE — PROGRESS NOTES
"  Assessment & Plan     Type 2 diabetes mellitus with hyperglycemia, without long-term current use of insulin (H)  Type 2 diabetes mellitus with diabetic microalbuminuria, without long-term current use of insulin (H)  -A1c 7.5, not at goal of <7. Not adhering to dietary and lifestyle changes the last few months, pt planning to go back to making healthier diet changes and exercising regularly. Recommended starting medication, plan to start metformin -side effects discussed.  -Discussed checking blood sugars, at least daily fasting and bringing readings to follow-up visit.  -Reminded patient to schedule diabetic eye exam.  -Follow-up in 6 weeks a scheduled for monitoring.  - Hemoglobin A1c  - Hemoglobin A1c  - Adult Eye  Referral  - metFORMIN (GLUCOPHAGE XR) 500 MG 24 hr tablet  Dispense: 113 tablet; Refill: 0  - blood glucose (NO BRAND SPECIFIED) test strip  Dispense: 100 strip; Refill: 6  - thin (NO BRAND SPECIFIED) lancets  Dispense: 100 each; Refill: 6    Essential hypertension with goal blood pressure less than 140/90   -Not at goal.  -Recommended increasing losartan today, pt deferred change. Pt would like to work on lifestyle changes for next 6 weeks.    Class 2 severe obesity due to excess calories with serious comorbidity and body mass index (BMI) of 36.0 to 36.9 in adult (H)  -Encouraged healthy dietary changes and increased physical activity.    Endometrial cancer (H)  -Monitored by oncology      BMI  Estimated body mass index is 35.28 kg/m  as calculated from the following:    Height as of this encounter: 1.5 m (4' 11.06\").    Weight as of this encounter: 79.4 kg (175 lb).             Nahed Montero is a 64 year old, presenting for the following health issues:  Diabetes and Recheck Medication        5/20/2025     9:55 AM   Additional Questions   Roomed by NIYA     History of Present Illness       Diabetes:   She presents for follow up of diabetes.  She is checking home blood glucose a few times " "a month.   She checks blood glucose before and after meals.  Blood glucose is sometimes over 200 and never under 70.  When her blood glucose is low, the patient is asymptomatic for confusion, blurred vision, lethargy and reports not feeling dizzy, shaky, or weak.  She is concerned about blood sugar frequently over 200.   She is having blurry vision.  The patient has not had a diabetic eye exam in the last 12 months.          She eats 2-3 servings of fruits and vegetables daily.She consumes 0 sweetened beverage(s) daily.She exercises with enough effort to increase her heart rate 9 or less minutes per day.  She exercises with enough effort to increase her heart rate 3 or less days per week. She is missing 1 dose(s) of medications per week.  She is not taking prescribed medications regularly due to remembering to take.      DM2: Blood sugar readings in the 200s, 3 readings over 300 in the last few weeks. A1c 7.5 today. Not exercising or eating well last few months due to cat being ill.     HTN: Last few clinic visits BP elevated.     Wt Readings from Last 4 Encounters:   05/20/25 79.4 kg (175 lb)   02/06/25 80 kg (176 lb 6.4 oz)   01/14/25 80.2 kg (176 lb 12.8 oz)   11/19/24 80.1 kg (176 lb 8 oz)      BP Readings from Last 6 Encounters:   05/20/25 (!) 148/86   02/06/25 (!) 145/74   01/14/25 (!) 140/84   11/19/24 (!) 142/80   08/21/24 126/66   07/12/24 139/80                    Objective    BP (!) 148/86   Pulse 86   Temp 97.9  F (36.6  C) (Temporal)   Resp 15   Ht 1.5 m (4' 11.06\")   Wt 79.4 kg (175 lb)   LMP  (LMP Unknown)   SpO2 98%   BMI 35.28 kg/m    Body mass index is 35.28 kg/m .  Physical Exam   GENERAL: alert and no distress  PSYCH: mentation appears normal, affect normal/bright            Signed Electronically by: Malcolm Yu,     "

## 2025-05-20 NOTE — PATIENT INSTRUCTIONS
Blood Glucose Targets:  -Fasting and before meal target is 80 - 130  -2 hours after a meal target is < 180

## 2025-05-21 ENCOUNTER — PATIENT OUTREACH (OUTPATIENT)
Dept: CARE COORDINATION | Facility: CLINIC | Age: 65
End: 2025-05-21
Payer: COMMERCIAL

## 2025-05-22 ENCOUNTER — ALLIED HEALTH/NURSE VISIT (OUTPATIENT)
Dept: FAMILY MEDICINE | Facility: CLINIC | Age: 65
End: 2025-05-22
Payer: COMMERCIAL

## 2025-05-22 DIAGNOSIS — Z23 ENCOUNTER FOR IMMUNIZATION: Primary | ICD-10-CM

## 2025-05-22 NOTE — PROGRESS NOTES
Prior to immunization administration, verified patients identity using patient s name and date of birth. Please see Immunization Activity for additional information.     Is the patient's temperature normal (100.5 or less)? Yes     Patient MEETS CRITERIA. PROCEED with vaccine administration.      Patient instructed to remain in clinic for 15 minutes afterwards, and to report any adverse reactions.      Link to Ancillary Visit Immunization Standing Orders SmartSet     Screening performed by Jessica Vasques MA on 5/22/2025 at 9:44 AM.

## 2025-06-24 ENCOUNTER — MYC MEDICAL ADVICE (OUTPATIENT)
Dept: FAMILY MEDICINE | Facility: CLINIC | Age: 65
End: 2025-06-24
Payer: COMMERCIAL

## 2025-06-26 SDOH — HEALTH STABILITY: PHYSICAL HEALTH: ON AVERAGE, HOW MANY MINUTES DO YOU ENGAGE IN EXERCISE AT THIS LEVEL?: 30 MIN

## 2025-06-26 SDOH — HEALTH STABILITY: PHYSICAL HEALTH: ON AVERAGE, HOW MANY DAYS PER WEEK DO YOU ENGAGE IN MODERATE TO STRENUOUS EXERCISE (LIKE A BRISK WALK)?: 3 DAYS

## 2025-06-26 ASSESSMENT — SOCIAL DETERMINANTS OF HEALTH (SDOH): HOW OFTEN DO YOU GET TOGETHER WITH FRIENDS OR RELATIVES?: PATIENT DECLINED

## 2025-06-26 NOTE — TELEPHONE ENCOUNTER
Dr. Yu --    BONNIE for upcoming visit.     See MyChart message.     Patient not having pain, swelling or tenderness or redness. Stiffness. Ok to wait until appointment on 7/1 to be evaluated.     MARCIANO RiosN RN  [facility]

## 2025-07-01 ENCOUNTER — ANCILLARY PROCEDURE (OUTPATIENT)
Dept: GENERAL RADIOLOGY | Facility: CLINIC | Age: 65
End: 2025-07-01
Attending: FAMILY MEDICINE
Payer: COMMERCIAL

## 2025-07-01 ENCOUNTER — OFFICE VISIT (OUTPATIENT)
Dept: FAMILY MEDICINE | Facility: CLINIC | Age: 65
End: 2025-07-01
Payer: COMMERCIAL

## 2025-07-01 VITALS
HEIGHT: 58 IN | HEART RATE: 84 BPM | DIASTOLIC BLOOD PRESSURE: 60 MMHG | SYSTOLIC BLOOD PRESSURE: 132 MMHG | WEIGHT: 174.2 LBS | TEMPERATURE: 97.4 F | RESPIRATION RATE: 18 BRPM | BODY MASS INDEX: 36.57 KG/M2 | OXYGEN SATURATION: 97 %

## 2025-07-01 DIAGNOSIS — E11.29 TYPE 2 DIABETES MELLITUS WITH DIABETIC MICROALBUMINURIA, WITHOUT LONG-TERM CURRENT USE OF INSULIN (H): ICD-10-CM

## 2025-07-01 DIAGNOSIS — M25.562 ACUTE PAIN OF LEFT KNEE: ICD-10-CM

## 2025-07-01 DIAGNOSIS — Z12.31 ENCOUNTER FOR SCREENING MAMMOGRAM FOR BREAST CANCER: ICD-10-CM

## 2025-07-01 DIAGNOSIS — Z00.00 ROUTINE GENERAL MEDICAL EXAMINATION AT A HEALTH CARE FACILITY: Primary | ICD-10-CM

## 2025-07-01 DIAGNOSIS — R80.9 TYPE 2 DIABETES MELLITUS WITH DIABETIC MICROALBUMINURIA, WITHOUT LONG-TERM CURRENT USE OF INSULIN (H): ICD-10-CM

## 2025-07-01 DIAGNOSIS — I10 ESSENTIAL HYPERTENSION WITH GOAL BLOOD PRESSURE LESS THAN 140/90: ICD-10-CM

## 2025-07-01 LAB
CREAT UR-MCNC: 121 MG/DL
MICROALBUMIN UR-MCNC: 55.3 MG/L
MICROALBUMIN/CREAT UR: 45.7 MG/G CR (ref 0–25)
URATE SERPL-MCNC: 6.2 MG/DL (ref 2.4–5.7)

## 2025-07-01 PROCEDURE — 1125F AMNT PAIN NOTED PAIN PRSNT: CPT | Performed by: FAMILY MEDICINE

## 2025-07-01 PROCEDURE — 3075F SYST BP GE 130 - 139MM HG: CPT | Performed by: FAMILY MEDICINE

## 2025-07-01 PROCEDURE — G2211 COMPLEX E/M VISIT ADD ON: HCPCS | Performed by: FAMILY MEDICINE

## 2025-07-01 PROCEDURE — 73562 X-RAY EXAM OF KNEE 3: CPT | Mod: TC | Performed by: RADIOLOGY

## 2025-07-01 PROCEDURE — 82043 UR ALBUMIN QUANTITATIVE: CPT | Performed by: FAMILY MEDICINE

## 2025-07-01 PROCEDURE — 3078F DIAST BP <80 MM HG: CPT | Performed by: FAMILY MEDICINE

## 2025-07-01 PROCEDURE — 99396 PREV VISIT EST AGE 40-64: CPT | Performed by: FAMILY MEDICINE

## 2025-07-01 PROCEDURE — 99214 OFFICE O/P EST MOD 30 MIN: CPT | Mod: 25 | Performed by: FAMILY MEDICINE

## 2025-07-01 PROCEDURE — 84550 ASSAY OF BLOOD/URIC ACID: CPT | Performed by: FAMILY MEDICINE

## 2025-07-01 PROCEDURE — 36415 COLL VENOUS BLD VENIPUNCTURE: CPT | Performed by: FAMILY MEDICINE

## 2025-07-01 PROCEDURE — 82570 ASSAY OF URINE CREATININE: CPT | Performed by: FAMILY MEDICINE

## 2025-07-01 RX ORDER — METFORMIN HYDROCHLORIDE 500 MG/1
500 TABLET, EXTENDED RELEASE ORAL
Qty: 90 TABLET | Refills: 1 | Status: SHIPPED | OUTPATIENT
Start: 2025-07-01

## 2025-07-01 ASSESSMENT — PAIN SCALES - GENERAL: PAINLEVEL_OUTOF10: MILD PAIN (2)

## 2025-07-01 NOTE — PATIENT INSTRUCTIONS
Patient Education   Preventive Care Advice   This is general advice given by our system to help you stay healthy. However, your care team may have specific advice just for you. Please talk to your care team about your preventive care needs.  Nutrition  Eat 5 or more servings of fruits and vegetables each day.  Try wheat bread, brown rice and whole grain pasta (instead of white bread, rice, and pasta).  Get enough calcium and vitamin D. Check the label on foods and aim for 100% of the RDA (recommended daily allowance).  Lifestyle  Exercise at least 150 minutes each week  (30 minutes a day, 5 days a week).  Do muscle strengthening activities 2 days a week. These help control your weight and prevent disease.  No smoking.  Wear sunscreen to prevent skin cancer.  Have a dental exam and cleaning every 6 months.  Yearly exams  See your health care team every year to talk about:  Any changes in your health.  Any medicines your care team has prescribed.  Preventive care, family planning, and ways to prevent chronic diseases.  Shots (vaccines)   HPV shots (up to age 26), if you've never had them before.  Hepatitis B shots (up to age 59), if you've never had them before.  COVID-19 shot: Get this shot when it's due.  Flu shot: Get a flu shot every year.  Tetanus shot: Get a tetanus shot every 10 years.  Pneumococcal, hepatitis A, and RSV shots: Ask your care team if you need these based on your risk.  Shingles shot (for age 50 and up)  General health tests  Diabetes screening:  Starting at age 35, Get screened for diabetes at least every 3 years.  If you are younger than age 35, ask your care team if you should be screened for diabetes.  Cholesterol test: At age 39, start having a cholesterol test every 5 years, or more often if advised.  Bone density scan (DEXA): At age 50, ask your care team if you should have this scan for osteoporosis (brittle bones).  Hepatitis C: Get tested at least once in your life.  STIs (sexually  transmitted infections)  Before age 24: Ask your care team if you should be screened for STIs.  After age 24: Get screened for STIs if you're at risk. You are at risk for STIs (including HIV) if:  You are sexually active with more than one person.  You don't use condoms every time.  You or a partner was diagnosed with a sexually transmitted infection.  If you are at risk for HIV, ask about PrEP medicine to prevent HIV.  Get tested for HIV at least once in your life, whether you are at risk for HIV or not.  Cancer screening tests  Cervical cancer screening: If you have a cervix, begin getting regular cervical cancer screening tests starting at age 21.  Breast cancer scan (mammogram): If you've ever had breasts, begin having regular mammograms starting at age 40. This is a scan to check for breast cancer.  Colon cancer screening: It is important to start screening for colon cancer at age 45.  Have a colonoscopy test every 10 years (or more often if you're at risk) Or, ask your provider about stool tests like a FIT test every year or Cologuard test every 3 years.  To learn more about your testing options, visit:   .  For help making a decision, visit:   https://bit.ly/ys78131.  Prostate cancer screening test: If you have a prostate, ask your care team if a prostate cancer screening test (PSA) at age 55 is right for you.  Lung cancer screening: If you are a current or former smoker ages 50 to 80, ask your care team if ongoing lung cancer screenings are right for you.  For informational purposes only. Not to replace the advice of your health care provider. Copyright   2023 [employer]. All rights reserved. Clinically reviewed by the [facility]. [employer] 420267 - REV 01/24.

## 2025-07-01 NOTE — PROGRESS NOTES
"Preventive Care Visit  [facility]  Malcolm Yu DO, Family Medicine  Jul 1, 2025      Assessment & Plan     Routine general medical examination at a health care facility  -Declined covid vaccine    Encounter for screening mammogram for breast cancer  - MA Screening Bilateral w/ Kiel    Acute pain of left knee  -Has had intermittent pain in this knee in the past. Recently had pain that started 4 weeks ago and has not improved. No recent injury or trauma. No obvious deformity, redness or swelling today. Discussed starting with xray of knee. Will obtain uric acid to look for possible gout.   -Pt would benefit from PT and follow-up with sports med if pain is not gout related  - Uric acid    Type 2 diabetes mellitus with diabetic microalbuminuria, without long-term current use of insulin (H)  -Fasting blood sugars improving, due for A1c in two months.  -Continue metformin and lifestyle changes  - metFORMIN (GLUCOPHAGE XR) 500 MG 24 hr tablet  Dispense: 90 tablet; Refill: 1  - Albumin Random Urine Quantitative with Creat Ratio  -Follow-up 9/2025 as scheduled.     Essential hypertension with goal blood pressure less than 140/90  -Well controlled today  -Continue lifestyle changes and low salt diet              BMI  Estimated body mass index is 36.82 kg/m  as calculated from the following:    Height as of this encounter: 1.465 m (4' 9.68\").    Weight as of this encounter: 79 kg (174 lb 3.2 oz).   Weight management plan: Discussed healthy diet and exercise guidelines  Reviewed preventive health counseling, as reflected in patient instructions       Regular exercise       Healthy diet/nutrition  Counseling  Appropriate preventive services were addressed with this patient via screening, questionnaire, or discussion as appropriate for fall prevention, nutrition, physical activity, Tobacco-use cessation, social engagement, weight loss and cognition.  Checklist reviewing preventive services available " has been given to the patient.  Reviewed patient's diet, addressing concerns and/or questions.   She is at risk for lack of exercise and has been provided with information to increase physical activity for the benefit of her well-being.   The patient was instructed to see the dentist every 6 months.             Nahed Montero is a 64 year old, presenting for the following:  Physical        7/1/2025     9:39 AM   Additional Questions   Roomed by Lan CRUZ    DM: checking blood sugars every other day in am. -134. Made changes to diet. Eating less sugar and salt. No to little meat. Taking metformin once day in am.     HTN: not checking at home. Following a low sodium diet.    Left leg stiffness. Has had symptoms before, triggered with particular motion causes pain around ankle and knee. Has an episode that started about 4 weeks. Better but has not gone away. Pain in back of knee and inner knee. No calf pain. Using ace wrap.    BP Readings from Last 6 Encounters:   07/01/25 132/60   05/20/25 (!) 148/86   02/06/25 (!) 145/74   01/14/25 (!) 140/84   11/19/24 (!) 142/80   08/21/24 126/66              Advance Care Planning    Discussed advance care planning with patient; informed AVS has link to [employer].        6/26/2025   General Health   How would you rate your overall physical health? Good   Feel stress (tense, anxious, or unable to sleep) Not at all         6/26/2025   Nutrition   Three or more servings of calcium each day? Yes   Diet: Low salt    Diabetic    Vegetarian/vegan   How many servings of fruit and vegetables per day? (!) 2-3   How many sweetened beverages each day? 0-1       Multiple values from one day are sorted in reverse-chronological order         6/26/2025   Exercise   Days per week of moderate/strenous exercise 3 days   Average minutes spent exercising at this level 30 min         6/26/2025   Social Factors   Frequency of gathering with friends or relatives Patient  declined   Worry food won't last until get money to buy more No   Food not last or not have enough money for food? No   Do you have housing? (Housing is defined as stable permanent housing and does not include staying outside in a car, in a tent, in an abandoned building, in an overnight shelter, or couch-surfing.) Yes   Are you worried about losing your housing? No   Lack of transportation? No   Unable to get utilities (heat,electricity)? No         6/26/2025   Fall Risk   Fallen 2 or more times in the past year? No   Trouble with walking or balance? No          6/26/2025   Dental   Dentist two times every year? (!) NO           Today's PHQ-2 Score:       5/19/2025    12:08 PM   PHQ-2 ( 1999 [employer])   Q1: Little interest or pleasure in doing things 0   Q2: Feeling down, depressed or hopeless 0   PHQ-2 Score 0    Q1: Little interest or pleasure in doing things Not at all   Q2: Feeling down, depressed or hopeless Not at all   PHQ-2 Score 0       Patient-reported         6/26/2025   Substance Use   Alcohol more than 3/day or more than 7/wk No   Do you use any other substances recreationally? No     Social History     Tobacco Use    Smoking status: Never     Passive exposure: Never    Smokeless tobacco: Never    Tobacco comments:     Parents smoked when I was young.   Vaping Use    Vaping status: Never Used   Substance Use Topics    Alcohol use: Not Currently     Comment: I drink once or twice a year.    Drug use: Never           9/8/2023   LAST FHS-7 RESULTS   1st degree relative breast or ovarian cancer No   Any relative bilateral breast cancer No   Any male have breast cancer No   Any ONE woman have BOTH breast AND ovarian cancer No   Any woman with breast cancer before 50yrs No   2 or more relatives with breast AND/OR ovarian cancer No   2 or more relatives with breast AND/OR bowel cancer No                6/26/2025   STI Screening   New sexual partner(s) since last STI/HIV test? No     History of abnormal Pap  "smear: Status post hysterectomy with removal of cervix and no history of CIN2 or greater or cervical cancer. Health Maintenance and Surgical History updated.       ASCVD Risk   The 10-year ASCVD risk score (Shayne CUNNINGHAM, et al., 2019) is: 12.4%    Values used to calculate the score:      Age: 64 years      Sex: Female      Is Non- : No      Diabetic: Yes      Tobacco smoker: No      Systolic Blood Pressure: 132 mmHg      Is BP treated: Yes      HDL Cholesterol: 46 mg/dL      Total Cholesterol: 152 mg/dL           Reviewed and updated as needed this visit by Provider                             Objective    Exam  /60 (BP Location: Right arm, Patient Position: Sitting, Cuff Size: Adult Large)   Pulse 84   Temp 97.4  F (36.3  C) (Temporal)   Resp 18   Ht 1.465 m (4' 9.68\")   Wt 79 kg (174 lb 3.2 oz)   LMP  (LMP Unknown)   SpO2 97%   BMI 36.82 kg/m     Estimated body mass index is 36.82 kg/m  as calculated from the following:    Height as of this encounter: 1.465 m (4' 9.68\").    Weight as of this encounter: 79 kg (174 lb 3.2 oz).    Physical Exam  GENERAL: alert and no distress  EYES: Eyes grossly normal to inspection, PERRL and conjunctivae and sclerae normal  HENT: nose and mouth without ulcers or lesions  NECK: no adenopathy, no asymmetry, masses, or scars  RESP: lungs clear to auscultation - no rales, rhonchi or wheezes  CV: regular rate and rhythm, normal S1 S2, no S3 or S4, no murmur, click or rub, no peripheral edema  ABDOMEN: soft, nontender, no hepatosplenomegaly, no masses and bowel sounds normal  MS: no gross musculoskeletal defects noted, no edema  SKIN: no suspicious lesions or rashes  NEURO: Normal strength and tone, mentation intact and speech normal  PSYCH: mentation appears normal, affect normal/bright        Signed Electronically by: Malcolm Yu DO    "

## 2025-07-02 ENCOUNTER — RESULTS FOLLOW-UP (OUTPATIENT)
Dept: FAMILY MEDICINE | Facility: CLINIC | Age: 65
End: 2025-07-02

## 2025-07-03 ENCOUNTER — MYC MEDICAL ADVICE (OUTPATIENT)
Dept: FAMILY MEDICINE | Facility: CLINIC | Age: 65
End: 2025-07-03
Payer: COMMERCIAL

## 2025-07-03 DIAGNOSIS — H91.90 HEARING LOSS: Primary | ICD-10-CM

## 2025-07-03 DIAGNOSIS — H91.93 HEARING DIFFICULTY OF BOTH EARS: ICD-10-CM

## 2025-07-03 NOTE — TELEPHONE ENCOUNTER
Writer responded via [employer].  MARCIANO CruzN, RN-BC  [facility]

## 2025-07-03 NOTE — TELEPHONE ENCOUNTER
Dr. Yu-Please review and advise.  Writer does not find kidney disease on problem list and per review of 2/6/25 CMP, kidney function normal    Thank you!  MARCIANO CruzN, RN-[facility]

## 2025-07-03 NOTE — TELEPHONE ENCOUNTER
Writer responded via [employer].  MARCIANO CruzN, RN-BC  [facility]

## 2025-07-03 NOTE — TELEPHONE ENCOUNTER
You should avoid taking NSAIDs (ibuprofen, Aleve, Advil, naproxen, etc) due to your history of high blood pressure.     Malcolm Yu, DO

## 2025-07-08 ENCOUNTER — PATIENT OUTREACH (OUTPATIENT)
Dept: CARE COORDINATION | Facility: CLINIC | Age: 65
End: 2025-07-08
Payer: COMMERCIAL

## 2025-07-09 ENCOUNTER — MYC MEDICAL ADVICE (OUTPATIENT)
Dept: FAMILY MEDICINE | Facility: CLINIC | Age: 65
End: 2025-07-09
Payer: COMMERCIAL

## 2025-07-10 ENCOUNTER — PATIENT OUTREACH (OUTPATIENT)
Dept: CARE COORDINATION | Facility: CLINIC | Age: 65
End: 2025-07-10
Payer: COMMERCIAL

## 2025-07-14 NOTE — PROGRESS NOTES
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 Denies unintentional weight loss, soaking night sweats, trouble breathing, abdominal bloating/fullness, persistent abdominal or pelvic pain, vaginal bleeding, new bowel or bladder concerns, swollen nodes, focal bony pain, or swelling. Denies pelvic/vaginal/sexual concerns.    Continues with some occasional stress and urge incontinence. Denies dysuria, hematuria, frequency, or other urinary concerns.      Follows with PCP routinely. Due for mammogram, has this scheduled later this summer. Up to date on CRC screening- Cologuard performed 2023 and negative. Exercising, working on weight loss with dietary changes.      ONCOLOGY HISTORY:  6/10/2023-2023: Admission to [facility] for hemorrhagic shock and uterine mass. Had XLap/JAVIER/BSO/PPLND. Final pathology stage 1A grade 2 endometrial adenocarcinoma. NSMP. MMR proficient. Non myoinvasive. No LVSI.           OBJECTIVE:    PHYSICAL EXAM:  /67   Pulse 80   Temp 97.9  F (36.6  C) (Oral)   Resp 16   Wt 77.9 kg (171 lb 11.2 oz)   LMP  (LMP Unknown)   SpO2 96%   BMI 36.29 kg/m       CONSTITUTIONAL: Alert non-toxic appearing female in no acute distress  RESPIRATORY: Respiratory effort unlabored  CV/PV: Bilateral lower extremities without edema  GASTROINTESTINAL: Abdomen soft, non-distended, and non-tender to palpation without masses or organomegaly  GENITOURINARY: External genitalia and urethral meatus pink without lesions, masses, or excoriation. Vagina pink and smooth without masses or lesions. Cervix surgically absent. Vaginal cuff without masses or lesions. Bimanual exam reveals no masses or fullness. Rectovaginal exam confirms these findings.  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  Now two years out from treatment, to transition to annual surveillance visits with me through summer 2029- after five years, she may transition to annual pelvic exams with PCP/gynecology.  No further imaging unless clinically indicated. No routine labs. Pap no longer indicated.  Return to clinic in 12 month(s) with Amanda Boyer CNP for surveillance.   Reviewed signs and symptoms of recurrent disease and when to seek further care.    Treatment/disease related effects:  Urinary incontinence: Symptoms of both stress and urge incontinence- referral for pelvic PT placed    Genetics:   Genetic counseling referral not indicated at this time- mismatch repair proteins intact, making Perdomo syndrome unlikely.    Health maintenance:   Follow up with PCP for routine cancer screenings, non-gynecologic concerns, and co-morbid conditions    Patient verbalized understanding of and agreement with plan- see AVS for patient instructions      IRENE Melendez, CNP  Division of Gynecologic Oncology

## 2025-07-15 ENCOUNTER — ONCOLOGY VISIT (OUTPATIENT)
Dept: ONCOLOGY | Facility: CLINIC | Age: 65
End: 2025-07-15
Attending: NURSE PRACTITIONER
Payer: COMMERCIAL

## 2025-07-15 VITALS
RESPIRATION RATE: 16 BRPM | HEART RATE: 80 BPM | BODY MASS INDEX: 36.29 KG/M2 | OXYGEN SATURATION: 96 % | WEIGHT: 171.7 LBS | TEMPERATURE: 97.9 F | SYSTOLIC BLOOD PRESSURE: 108 MMHG | DIASTOLIC BLOOD PRESSURE: 67 MMHG

## 2025-07-15 DIAGNOSIS — N39.46 MIXED INCONTINENCE URGE AND STRESS (MALE)(FEMALE): ICD-10-CM

## 2025-07-15 DIAGNOSIS — Z08 ENCOUNTER FOR FOLLOW-UP SURVEILLANCE OF ENDOMETRIAL CANCER: Primary | ICD-10-CM

## 2025-07-15 DIAGNOSIS — Z85.42 ENCOUNTER FOR FOLLOW-UP SURVEILLANCE OF ENDOMETRIAL CANCER: Primary | ICD-10-CM

## 2025-07-15 PROCEDURE — 99213 OFFICE O/P EST LOW 20 MIN: CPT | Performed by: NURSE PRACTITIONER

## 2025-07-15 PROCEDURE — G0463 HOSPITAL OUTPT CLINIC VISIT: HCPCS | Performed by: NURSE PRACTITIONER

## 2025-07-15 ASSESSMENT — PAIN SCALES - GENERAL: PAINLEVEL_OUTOF10: MILD PAIN (1)

## 2025-07-15 NOTE — PATIENT INSTRUCTIONS
Your visit today was with Amanda Boyer CNP for surveillance.    Plan:  No evidence of disease on today's exam  Return to clinic in one year with Amanda Boyer CNP for surveillance  Surveillance visits every 6 months x2 years (you have completed this!), then annually x3 years (through summer 2029). May transition to general practitioner after five years for annual pelvic exam.  Pelvic PT referral for the urinary symptoms    SIGNS AND SYMPTOMS OF RECURRENT DISEASE    Symptoms of cancer coming back can vary from person to person and it's important to know your own baseline and health factors that may cause symptoms. The following list includes symptoms that would warrant further investigation with your oncology team.    Vaginal bleeding or spotting  Persistent pelvic, abdominal, or bone pain that is persistent and does not improve over time  New persistent dry cough or shortness of breath at rest without a known cause  Unintentional weight loss  New swelling in the legs  New changes in bowel/bladder patterns  New, persistent bloating/fullness  New, persistent onset of drenching night sweats  New, persistent lumps or bumps in the groin or neck  New severe headaches, tunnel vision, double vision, or seizures  Sudden and persistent fatigue without a known cause, particularly if associated with any of the above symptoms    If you have symptoms that are new, persistent, or concerning to you and have been ongoing for two weeks or longer, please contact your oncology care team.    [facility] triage: [phone]    If you have difficulty reaching triage during off hours, you can call [phone] and ask to speak to the gynecologic oncology resident on call      For urgent questions or concerns, please call rather than send a medical message.

## 2025-07-15 NOTE — LETTER
7/15/2025      Kylah Ojeda  [address]  [place] [address]      Dear Colleague,    Thank you for referring your patient, Kylah Ojeda, to the [facility]. Please see a copy of my visit note below.    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 Denies unintentional weight loss, soaking night sweats, trouble breathing, abdominal bloating/fullness, persistent abdominal or pelvic pain, vaginal bleeding, new bowel or bladder concerns, swollen nodes, focal bony pain, or swelling. Denies pelvic/vaginal/sexual concerns.    Continues with some occasional stress and urge incontinence. Denies dysuria, hematuria, frequency, or other urinary concerns.      Follows with PCP routinely. Due for mammogram, has this scheduled later this summer. Up to date on CRC screening- Cologuard performed 2023 and negative. Exercising, working on weight loss with dietary changes.      ONCOLOGY HISTORY:  6/10/2023-2023: Admission to [facility] for hemorrhagic shock and uterine mass. Had XLap/JAVIER/BSO/PPLND. Final pathology stage 1A grade 2 endometrial adenocarcinoma. NSMP. MMR proficient. Non myoinvasive. No LVSI.           OBJECTIVE:    PHYSICAL EXAM:  /67   Pulse 80   Temp 97.9  F (36.6  C) (Oral)   Resp 16   Wt 77.9 kg (171 lb 11.2 oz)   LMP  (LMP Unknown)   SpO2 96%   BMI 36.29 kg/m       CONSTITUTIONAL: Alert non-toxic appearing female in no acute distress  RESPIRATORY: Respiratory effort unlabored  CV/PV: Bilateral lower extremities without edema  GASTROINTESTINAL: Abdomen soft, non-distended, and non-tender to palpation without masses or organomegaly  GENITOURINARY: External genitalia and urethral meatus pink without lesions, masses, or  excoriation. Vagina pink and smooth without masses or lesions. Cervix surgically absent. Vaginal cuff without masses or lesions. Bimanual exam reveals no masses or fullness. Rectovaginal exam confirms these findings.  LYMPHATIC: Cervical, supraclavicular, and inguinal lymph nodes without adenopathy  PSYCHIATRIC: Pleasant and interactive, affect euthymic, makes appropriate eye contact, thought process linear    Wt Readings from Last 5 Encounters:   07/15/25 77.9 kg (171 lb 11.2 oz)   07/01/25 79 kg (174 lb 3.2 oz)   05/20/25 79.4 kg (175 lb)   02/06/25 80 kg (176 lb 6.4 oz)   01/14/25 80.2 kg (176 lb 12.8 oz)           ASSESSMENT/PLAN:    History of stage IA grade 2 endometrial endometrioid adenocarcinoma, pMMR:   No clinical evidence of disease on today's exam.  Now two years out from treatment, to transition to annual surveillance visits with me through summer 2029- after five years, she may transition to annual pelvic exams with PCP/gynecology.  No further imaging unless clinically indicated. No routine labs. Pap no longer indicated.  Return to clinic in 12 month(s) with Amanda Boyer CNP for surveillance.   Reviewed signs and symptoms of recurrent disease and when to seek further care.    Treatment/disease related effects:  Urinary incontinence: Symptoms of both stress and urge incontinence- referral for pelvic PT placed    Genetics:   Genetic counseling referral not indicated at this time- mismatch repair proteins intact, making Perdomo syndrome unlikely.    Health maintenance:   Follow up with PCP for routine cancer screenings, non-gynecologic concerns, and co-morbid conditions    Patient verbalized understanding of and agreement with plan- see AVS for patient instructions      IRENE Melendez, CNP  Division of Gynecologic Oncology        Again, thank you for allowing me to participate in the care of your patient.        Sincerely,        IRENE Melendez CNP    Electronically signed

## 2025-07-16 ENCOUNTER — MYC MEDICAL ADVICE (OUTPATIENT)
Dept: FAMILY MEDICINE | Facility: CLINIC | Age: 65
End: 2025-07-16
Payer: COMMERCIAL

## 2025-07-22 ENCOUNTER — ALLIED HEALTH/NURSE VISIT (OUTPATIENT)
Dept: FAMILY MEDICINE | Facility: CLINIC | Age: 65
End: 2025-07-22
Payer: COMMERCIAL

## 2025-07-22 DIAGNOSIS — Z23 ENCOUNTER FOR IMMUNIZATION: Primary | ICD-10-CM

## 2025-07-22 PROCEDURE — 90750 HZV VACC RECOMBINANT IM: CPT

## 2025-07-22 PROCEDURE — 90471 IMMUNIZATION ADMIN: CPT

## 2025-07-22 NOTE — PROGRESS NOTES
Prior to immunization administration, verified patients identity using patient s name and date of birth. Please see Immunization Activity for additional information.     Is the patient's temperature normal (100.4 or less)? Yes     Patient MEETS CRITERIA. PROCEED with vaccine administration.      Patient instructed to remain in clinic for 15 minutes afterwards, and to report any adverse reactions.      Link to Ancillary Visit Immunization Standing Orders SmartSet     Screening performed by Leigh Orellana MA on 7/22/2025 at 9:51 AM.

## 2025-07-22 NOTE — PROGRESS NOTES
"Pt is here for 2nd shingles vaccine. Writer was informed by MA that pt is concerned about symptoms of Guillain-Reinbeck syndrome. Pt reported in her questionnaire that she had left leg stiffening after receiving 1st shingles vaccine on 5/22/25. Pt reported she had to use walker at the time. She is still using a walker to help her walk occasionally. \"There is a limp.\" Within the last week, she has not used walker as much as the stiffening is \"almost gone.\" Pt also reported that she cannot fully bend her left leg, but she has been exercising her leg, changed diet (no sugar and no salt). Pt would declined referral to physical therapy at this time as she wants to wait until pain is almost gone and will do home physical therapy. No swelling or redness noticed. No deformity.     Pt also discussed this with PCP at 7/1/25 visit. Labs were completed with uric acid level of 6.2, which pcp thinks that pt may have had gout in her knee. Writer read lab message from pcp to pt \"Hi Kylah, The protein in your urine has improved.  Your uric acid is slightly increased, I think you may have had gout in your knee. If you would like to discuss gout treatment further, I recommend scheduling a video visit. You can also do some PT for your knee if you would like.\"    Writer secured message pcp Dr. Yu, and pcp gave the ok for pt to receive 2nd shingles vaccine today. Writer relayed this message to MA to proceed with vaccination.    Elias Richards, MARCIANON, PHN, RN-[facility]       "

## 2025-07-31 ENCOUNTER — THERAPY VISIT (OUTPATIENT)
Dept: PHYSICAL THERAPY | Facility: CLINIC | Age: 65
End: 2025-07-31
Payer: COMMERCIAL

## 2025-07-31 DIAGNOSIS — N39.46 MIXED INCONTINENCE URGE AND STRESS (MALE)(FEMALE): ICD-10-CM

## 2025-07-31 NOTE — PROGRESS NOTES
PHYSICAL THERAPY EVALUATION  Type of Visit: Evaluation       Fall Risk Screen:  Have you fallen 2 or more times in the past year?: No  Have you fallen and had an injury in the past year?: No    Subjective  - Pt notes that urgency since her hysterectomy in 2023, leaking getting more common. Leaking with cough only occurred if coughing really hard.         Presenting condition or subjective complaint: Sometimes when I have urgency to urinate I leak before I reach the bathroom.  I also occasionally leak if I sneeze or cough.  Date of onset: 07/15/25    Relevant medical history: Diabetes; High blood pressure; Overweight   Dates & types of surgery: Cancer tumor removal, full hysterectomy in July 2023.    Prior diagnostic imaging/testing results:       Prior therapy history for the same diagnosis, illness or injury: No      Living Environment  Social support: Alone   Type of home: Apartment/condo   Stairs to enter the home: Yes 1 Is there a railing: Yes     Ramp: No   Stairs inside the home: Yes 1 Is there a railing: Yes     Help at home: None  Equipment owned:       Employment: No    Hobbies/Interests: Writing, cartooning, reading.    Patient goals for therapy: To have strong enough muscles to precent leakage.       Objective      PELVIC EVALUATION  ADDITIONAL HISTORY:  Sex assigned at birth: Female  Gender identity: Female    Pronouns: She/Her Hers      Bladder History: Pt notes that she typically urinate every hour, gets up 12:30 and 2:30-3 o clock and then again by 5. Daytime is worse for urgency. Frequency seems to have increased over the last 5 years.   Feels bladder filling: Yes  Triggers for feeling of inability to wait to go to the bathroom: No   Waiting over an hour. Standing up from prolonged sitting.   How long can you wait to urinate: Varies.  Gets up at night to urinate: Yes 1 to 3 wake up with urge to pee.   Can stop the flow of urine when urinating: Yes  Volume of urine usually released: Average   Other  issues:   Usually double voids before bed.   Number of bladder infections in last 12 months:    Fluid intake per day: 32+ oz.    - smoothies oat milk smoothies, very occasionally will have an iced latte. 4-6 16oz of plain water.   Medications taken for bladder: No     Activities causing urine leak: Cough; Hurrying to the bathroom due to a strong urge to urinate (pee)    Amount of urine typically leaked: A teaspoon, with an occasional tablespoon. Once every 3 days.   Pads used to help with leaking: No        Bowel History: More constipated overall, that's her baseline. Sometimes requires breath holding and effort.   Frequency of bowel movement: Every 2 to 3 days, but I'm uncertain.  Consistency of stool: Other Varies based on my diet and how much water I've had.  Ignores the urge to defecate: No  Other bowel issues: Straining to have bowel movement  Length of time spent trying to have a bowel movement: Perhaps 2 minutes, I'm not sure.  And it's not common.    Sexual Function History:  Sexual orientation: Straight    Sexually active: No  Lubrication used: No No  Pelvic pain:     Denies a history of pelvic pain.   Pain or difficulty with orgasms/erection/ejaculation: No    State of menopause: Post-menopause (I am done with menopause)  Hormone medications: No      Are you currently pregnant: No  Number of previous pregnancies: 1  Number of deliveries: 0  Have you been diagnosed with pelvic prolapse or abdominal separation: No  Do you get regular exercise: Yes  I do this type of exercise: Walking 1 mile per day, calesthentics 3x per week- last few months has been getting less sleep, potentially gout but wants to get back to exercises.  Have you tried pelvic floor strengthening exercises for 4 weeks: No  Do you have any history of trauma that is relevant to your care that you d like to share: No      Discussed reason for referral regarding pelvic health needs and external/internal pelvic floor muscle examination with  patient/guardian.  Opportunity provided to ask questions and verbal consent for assessment and intervention was given.    POSTURE: Standing Posture: Lordosis increased  LUMBAR SCREEN: Flexion WFL, ext: min loss, rotation min loss   HIP SCREEN:  Strength: Hip Flex: 5-/5, Hip IR 5/5 bilat Hip ER 5-/5 bilat   Functional Strength Testing: Double Leg Squat: Improper use of glutes/hips  SLS: + WFL    PELVIC/SI SCREEN: Increased hip IR bilat, good hamstring flexiblity   BREATHING SYMMETRY: chest dominance    PELVIC EXAM  External Visual Inspection:  At rest: Normal  With voluntary pelvic floor contraction: Perineal elevation  Relaxation of PFM: Partial/delayed relaxation  With intra-abdominal pressure: Cough: Perineal descent  Bearing down as defecation: Perineal descent    Integumentary:   Introitus: Unremarkable    External Digital Palpation per Perineum:   Ischiocavernosis: Unremarkable  Bulbo cavernosis: Unremarkable  Transverse perineal: Unremarkable  Levator ani: Unremarkable    Internal Digital Palpation:  Per Vagina:  Myofascial Resistance to Palpation: Firm  Digital Muscle Performance: P (Power): 2/5  E (Endurance): 10 seconds   F (Fast Twitch): 10/10  Compensations: Abdominals, Breath holding  Relaxation Post-Contraction: Normal    Assessment & Plan   CLINICAL IMPRESSIONS  Medical Diagnosis: Female stress incontinence    Treatment Diagnosis: Female stress incontinence   Impression/Assessment: Patient is a 64 year old female with mixed incontinence complaints.  The following significant findings have been identified: Decreased ROM/flexibility, Decreased strength, Decreased proprioception, Impaired muscle performance, Decreased activity tolerance, and Impaired posture. These impairments interfere with their ability to perform self care tasks, recreational activities, household mobility, and community mobility as compared to previous level of function.     Clinical Decision Making (Complexity):  Clinical  Presentation: Stable/Uncomplicated  Clinical Presentation Rationale: based on medical and personal factors listed in PT evaluation  Clinical Decision Making (Complexity): Low complexity    PLAN OF CARE  Treatment Interventions:  Modalities: Biofeedback  Interventions: Manual Therapy, Neuromuscular Re-education, Therapeutic Activity, Therapeutic Exercise, Self-Care/Home Management    Long Term Goals     PT Goal 1  Goal Identifier: Leaking  Goal Description: Pt will not leak with urge or increased intra-abdominal pressure for 2 weeks.  Rationale: to maximize safety and independence with self cares  Target Date: 10/28/25      Frequency of Treatment: 1x per week for 4 weeks, 2x per month for 2 months  Duration of Treatment: 12 weeks    Education Assessment:   Learner/Method: Patient;No Barriers to Learning    Risks and benefits of evaluation/treatment have been explained.   Patient/Family/caregiver agrees with Plan of Care.     Evaluation Time:        Signing Clinician: Rebeca Tejeda PT        [facility]                                                                                   OUTPATIENT PHYSICAL THERAPY      PLAN OF TREATMENT FOR OUTPATIENT REHABILITATION   Patient's Last Name, First Name, RAMA.Kylah Tang    YOB: 1960   Provider's Name   [facility]   Medical Record No.  [id number]     Onset Date: 07/15/25  Start of Care Date: 07/31/25     Medical Diagnosis:  Female stress incontinence      PT Treatment Diagnosis:  Female stress incontinence Plan of Treatment  Frequency/Duration: 1x per week for 4 weeks, 2x per month for 2 months/ 12 weeks    Certification date from 07/31/25 to 10/28/25         See note for plan of treatment details and functional goals     Rebeca Tejeda PT                         I CERTIFY THE NEED FOR THESE SERVICES FURNISHED UNDER        THIS PLAN OF TREATMENT AND WHILE UNDER MY CARE     (Physician  attestation of this document indicates review and certification of the therapy plan).              Referring Provider:  Amanda Boyer    Initial Assessment  See Epic Evaluation- Start of Care Date: 07/31/25

## 2025-08-07 ENCOUNTER — OFFICE VISIT (OUTPATIENT)
Dept: OPHTHALMOLOGY | Facility: CLINIC | Age: 65
End: 2025-08-07
Attending: FAMILY MEDICINE
Payer: COMMERCIAL

## 2025-08-07 DIAGNOSIS — H43.813 POSTERIOR VITREOUS DETACHMENT, BOTH EYES: ICD-10-CM

## 2025-08-07 DIAGNOSIS — E11.65 TYPE 2 DIABETES MELLITUS WITH HYPERGLYCEMIA, WITHOUT LONG-TERM CURRENT USE OF INSULIN (H): Primary | ICD-10-CM

## 2025-08-07 DIAGNOSIS — H35.413 BILATERAL RETINAL LATTICE DEGENERATION: ICD-10-CM

## 2025-08-07 DIAGNOSIS — H25.813 COMBINED FORMS OF AGE-RELATED CATARACT OF BOTH EYES: ICD-10-CM

## 2025-08-07 DIAGNOSIS — H52.13 MYOPIA OF BOTH EYES: ICD-10-CM

## 2025-08-07 ASSESSMENT — SLIT LAMP EXAM - LIDS
COMMENTS: 1+ MGD
COMMENTS: TR MGD

## 2025-08-07 ASSESSMENT — REFRACTION_WEARINGRX
OS_AXIS: 107
OS_CYLINDER: +0.25
OD_SPHERE: -13.25
OS_SPHERE: -12.25
OD_CYLINDER: +1.50
SPECS_TYPE: DISTANCE
OD_AXIS: 094

## 2025-08-07 ASSESSMENT — REFRACTION_MANIFEST
OD_ADD: +2.50
OS_CYLINDER: +0.50
OD_CYLINDER: +0.75
OD_CYLINDER: +1.25
OD_AXIS: 110
OS_CYLINDER: +0.50
OD_SPHERE: -12.25
OS_SPHERE: -12.00
OS_SPHERE: -12.25
OS_AXIS: 115
OD_AXIS: 105
OS_ADD: +2.50
OS_AXIS: 080
OD_SPHERE: -12.75

## 2025-08-07 ASSESSMENT — EXTERNAL EXAM - LEFT EYE: OS_EXAM: NORMAL

## 2025-08-07 ASSESSMENT — CONF VISUAL FIELD
OS_NORMAL: 1
OS_SUPERIOR_NASAL_RESTRICTION: 0
OD_SUPERIOR_NASAL_RESTRICTION: 0
OD_INFERIOR_TEMPORAL_RESTRICTION: 0
OS_SUPERIOR_TEMPORAL_RESTRICTION: 0
OS_INFERIOR_NASAL_RESTRICTION: 0
OD_NORMAL: 1
OD_SUPERIOR_TEMPORAL_RESTRICTION: 0
OS_INFERIOR_TEMPORAL_RESTRICTION: 0
OD_INFERIOR_NASAL_RESTRICTION: 0

## 2025-08-07 ASSESSMENT — REFRACTION
OS_SPHERE: -14.50
OS_CYLINDER: +0.50
OD_SPHERE: -15.75
OS_AXIS: 115
OD_CYLINDER: +2.00
OD_AXIS: 108

## 2025-08-07 ASSESSMENT — VISUAL ACUITY
METHOD_MR_RETINOSCOPY: 1
METHOD: SNELLEN - LINEAR
OD_CC+: -2
OD_CC: 20/30
OS_CC: 20/30
CORRECTION_TYPE: GLASSES
OS_CC+: -1

## 2025-08-07 ASSESSMENT — CUP TO DISC RATIO
OS_RATIO: 0.1
OD_RATIO: 0.1

## 2025-08-07 ASSESSMENT — TONOMETRY
IOP_METHOD: TONOPEN
OD_IOP_MMHG: 14
OS_IOP_MMHG: 17

## 2025-08-07 ASSESSMENT — EXTERNAL EXAM - RIGHT EYE: OD_EXAM: NORMAL

## 2025-08-12 ENCOUNTER — ANCILLARY PROCEDURE (OUTPATIENT)
Dept: MAMMOGRAPHY | Facility: CLINIC | Age: 65
End: 2025-08-12
Attending: FAMILY MEDICINE
Payer: COMMERCIAL

## 2025-08-12 DIAGNOSIS — Z12.31 ENCOUNTER FOR SCREENING MAMMOGRAM FOR BREAST CANCER: ICD-10-CM

## 2025-08-12 PROCEDURE — 77067 SCR MAMMO BI INCL CAD: CPT | Performed by: RADIOLOGY

## 2025-08-12 PROCEDURE — 77063 BREAST TOMOSYNTHESIS BI: CPT | Performed by: RADIOLOGY

## 2025-08-13 ENCOUNTER — PATIENT OUTREACH (OUTPATIENT)
Dept: CARE COORDINATION | Facility: CLINIC | Age: 65
End: 2025-08-13
Payer: COMMERCIAL

## 2025-08-13 DIAGNOSIS — E11.9 TYPE 2 DIABETES MELLITUS WITHOUT COMPLICATION, WITHOUT LONG-TERM CURRENT USE OF INSULIN (H): ICD-10-CM

## 2025-08-13 DIAGNOSIS — I10 ESSENTIAL HYPERTENSION WITH GOAL BLOOD PRESSURE LESS THAN 140/90: ICD-10-CM

## 2025-08-13 DIAGNOSIS — R80.9 MICROALBUMINURIA: ICD-10-CM

## 2025-08-13 RX ORDER — AMLODIPINE BESYLATE 10 MG/1
10 TABLET ORAL DAILY
Qty: 90 TABLET | Refills: 2 | Status: SHIPPED | OUTPATIENT
Start: 2025-08-13

## 2025-08-13 RX ORDER — LOSARTAN POTASSIUM 25 MG/1
TABLET ORAL
Qty: 90 TABLET | Refills: 0 | Status: SHIPPED | OUTPATIENT
Start: 2025-08-13

## 2025-08-23 ENCOUNTER — HEALTH MAINTENANCE LETTER (OUTPATIENT)
Age: 65
End: 2025-08-23

## 2025-09-02 DIAGNOSIS — E78.5 HYPERLIPIDEMIA LDL GOAL <100: ICD-10-CM

## 2025-09-02 RX ORDER — ROSUVASTATIN CALCIUM 5 MG/1
5 TABLET, COATED ORAL DAILY
Qty: 90 TABLET | Refills: 2 | Status: SHIPPED | OUTPATIENT
Start: 2025-09-02

## 2025-09-09 ENCOUNTER — PRE VISIT (OUTPATIENT)
Dept: OPHTHALMOLOGY | Facility: CLINIC | Age: 65
End: 2025-09-09

## (undated) DEVICE — PACK AB HYST II

## (undated) DEVICE — SYR BULB IRRIG DOVER 60 ML LATEX FREE 67000

## (undated) DEVICE — RX SURGIFLO HEMOSTATIC MATRIX W/THROMBIN 8ML 2994

## (undated) DEVICE — ESU ELEC BLADE E-SEP INSULATED NEPTUNE 165MM 0703-165-002

## (undated) DEVICE — SU VICRYL 0 TIE 54" J608H

## (undated) DEVICE — BNDG ABDOMINAL BINDER 9X45-62" 79-89071

## (undated) DEVICE — SU VICRYL 0 CT-1 27" UND J260H

## (undated) DEVICE — LINEN TOWEL PACK X30 5481

## (undated) DEVICE — SU VICRYL 2-0 CTX 36" J369H

## (undated) DEVICE — WIPE PREMOIST CLEANSING WASHCLOTHS 7988

## (undated) DEVICE — TUBING SMOKE EVAC 3/8"X10' 0702-045-023

## (undated) DEVICE — WIPES FOLEY CARE SURESTEP PROVON DFC100

## (undated) DEVICE — LINEN TOWEL PACK X6 WHITE 5487

## (undated) DEVICE — SUCTION SLEEVE NEPTUNE 2 125MM 0703-005-125

## (undated) DEVICE — ESU PENCIL SMOKE EVAC W/ROCKER SWITCH 0703-047-000

## (undated) DEVICE — SU VICRYL 0 CT-2 27" J334H

## (undated) DEVICE — SU PDS II 1 TP-1 48" Z880G

## (undated) DEVICE — DRSG STERI STRIP 1/2X4" R1547

## (undated) DEVICE — SU VICRYL 4-0 PS-2 18" UND J496H

## (undated) DEVICE — BLADE CLIPPER SGL USE 9680

## (undated) DEVICE — CLIP HORIZON MED BLUE 002200

## (undated) DEVICE — Device

## (undated) DEVICE — PREP SCRUB SOL EXIDINE 4% CHG 4OZ 29002-404

## (undated) DEVICE — DRAPE LEGGINGS CLEAR 8430

## (undated) DEVICE — SUCTION MANIFOLD NEPTUNE 2 SYS 4 PORT 0702-020-000

## (undated) DEVICE — PREP CHLORAPREP 26ML TINTED HI-LITE ORANGE 930815

## (undated) RX ORDER — SODIUM CHLORIDE, SODIUM LACTATE, POTASSIUM CHLORIDE, CALCIUM CHLORIDE 600; 310; 30; 20 MG/100ML; MG/100ML; MG/100ML; MG/100ML
INJECTION, SOLUTION INTRAVENOUS
Status: DISPENSED
Start: 2023-06-12

## (undated) RX ORDER — DEXAMETHASONE SODIUM PHOSPHATE 4 MG/ML
INJECTION, SOLUTION INTRA-ARTICULAR; INTRALESIONAL; INTRAMUSCULAR; INTRAVENOUS; SOFT TISSUE
Status: DISPENSED
Start: 2023-06-12

## (undated) RX ORDER — HYDROMORPHONE HYDROCHLORIDE 1 MG/ML
INJECTION, SOLUTION INTRAMUSCULAR; INTRAVENOUS; SUBCUTANEOUS
Status: DISPENSED
Start: 2023-06-12

## (undated) RX ORDER — ACETAMINOPHEN 325 MG/1
TABLET ORAL
Status: DISPENSED
Start: 2023-06-12

## (undated) RX ORDER — METRONIDAZOLE 500 MG/100ML
INJECTION, SOLUTION INTRAVENOUS
Status: DISPENSED
Start: 2023-06-12

## (undated) RX ORDER — FENTANYL CITRATE 50 UG/ML
INJECTION, SOLUTION INTRAMUSCULAR; INTRAVENOUS
Status: DISPENSED
Start: 2023-06-12

## (undated) RX ORDER — CEFAZOLIN SODIUM/WATER 2 G/20 ML
SYRINGE (ML) INTRAVENOUS
Status: DISPENSED
Start: 2023-06-12